# Patient Record
Sex: MALE | Race: WHITE | NOT HISPANIC OR LATINO | Employment: OTHER | URBAN - METROPOLITAN AREA
[De-identification: names, ages, dates, MRNs, and addresses within clinical notes are randomized per-mention and may not be internally consistent; named-entity substitution may affect disease eponyms.]

---

## 2017-01-27 ENCOUNTER — HOSPITAL ENCOUNTER (OUTPATIENT)
Dept: PHYSICAL THERAPY | Facility: HOSPITAL | Age: 82
Setting detail: RECURRING SERIES
Discharge: HOME | End: 2017-03-14
Attending: FAMILY MEDICINE | Admitting: FAMILY MEDICINE

## 2017-08-23 ENCOUNTER — HOSPITAL ENCOUNTER (OUTPATIENT)
Dept: FAMILY MEDICINE CLINIC | Facility: CLINIC | Age: 82
Setting detail: SPECIMEN
Discharge: HOME | End: 2017-08-23
Attending: FAMILY MEDICINE | Admitting: FAMILY MEDICINE

## 2017-08-23 LAB
BILIRUB UR QL STRIP: NEGATIVE MG/DL
CASTS URNS QL MICRO: ABNORMAL /[LPF]
COLOR UR: YELLOW
CONV BACTERIA IN URINE MICRO: NEGATIVE
CONV CLARITY OF URINE: ABNORMAL
CONV HYALINE CASTS IN URINE MICRO: 4 /[LPF] (ref 0–5)
CONV PROTEIN IN URINE BY AUTOMATED TEST STRIP: NEGATIVE MG/DL
CONV SMALL ROUND CELLS: ABNORMAL /[HPF]
CONV UROBILINOGEN IN URINE BY AUTOMATED TEST STRIP: 0.2 MG/DL
CULTURE INDICATED?: ABNORMAL
GLUCOSE UR QL: NEGATIVE MG/DL
HGB UR QL STRIP: NEGATIVE
KETONES UR QL STRIP: NEGATIVE MG/DL
LEUKOCYTE ESTERASE UR QL STRIP: NEGATIVE
NITRITE UR QL STRIP: NEGATIVE
PH UR STRIP.AUTO: 5.5 [PH] (ref 4.5–8)
PSA SERPL-MCNC: 4.52 NG/ML (ref 0–4)
RBC #/AREA URNS HPF: 0 /[HPF] (ref 0–3)
SP GR UR: 1.01 (ref 1–1.03)
SPERM URNS QL MICRO: ABNORMAL /[HPF]
SQUAMOUS SPT QL MICRO: 1 /[HPF] (ref 0–5)
UNIDENT CRYS URNS QL MICRO: ABNORMAL /[HPF]
WBC #/AREA URNS HPF: 1 /[HPF] (ref 0–5)
YEAST SPEC QL WET PREP: ABNORMAL /[HPF]

## 2019-08-19 RX ORDER — LISINOPRIL 5 MG/1
TABLET ORAL
Qty: 90 TABLET | Refills: 4 | Status: SHIPPED | OUTPATIENT
Start: 2019-08-19 | End: 2020-11-10

## 2019-09-09 ENCOUNTER — OFFICE VISIT (OUTPATIENT)
Dept: FAMILY MEDICINE CLINIC | Facility: CLINIC | Age: 84
End: 2019-09-09

## 2019-09-09 VITALS
HEART RATE: 83 BPM | WEIGHT: 199.6 LBS | SYSTOLIC BLOOD PRESSURE: 120 MMHG | BODY MASS INDEX: 28.58 KG/M2 | HEIGHT: 70 IN | OXYGEN SATURATION: 95 % | RESPIRATION RATE: 16 BRPM | DIASTOLIC BLOOD PRESSURE: 68 MMHG | TEMPERATURE: 98 F

## 2019-09-09 DIAGNOSIS — R05.9 COUGH: Primary | ICD-10-CM

## 2019-09-09 PROCEDURE — 99213 OFFICE O/P EST LOW 20 MIN: CPT | Performed by: NURSE PRACTITIONER

## 2019-09-09 RX ORDER — ATORVASTATIN CALCIUM 20 MG/1
TABLET, FILM COATED ORAL
COMMUNITY
Start: 2019-08-08 | End: 2019-10-20 | Stop reason: SDUPTHER

## 2019-09-09 RX ORDER — ASPIRIN 81 MG/1
81 TABLET ORAL DAILY
COMMUNITY

## 2019-09-09 RX ORDER — HYDROCORTISONE ACETATE 0.5 %
CREAM (GRAM) TOPICAL
COMMUNITY
End: 2021-11-17

## 2019-09-09 RX ORDER — LEVOTHYROXINE SODIUM 50 MCG
TABLET ORAL
COMMUNITY
Start: 2019-09-04 | End: 2019-12-03 | Stop reason: SDUPTHER

## 2019-09-09 NOTE — PROGRESS NOTES
"Subjective   Willis Lechuga is a 84 y.o. male.     Chief Complaint   Patient presents with   • Cough       /68 (BP Location: Left arm, Patient Position: Sitting, Cuff Size: Adult)   Pulse 83   Temp 98 °F (36.7 °C) (Oral)   Resp 16   Ht 176.5 cm (69.5\")   Wt 90.5 kg (199 lb 9.6 oz)   SpO2 95%   BMI 29.05 kg/m²     BP Readings from Last 3 Encounters:   09/09/19 120/68   06/08/18 150/70   08/23/17 160/86       Wt Readings from Last 3 Encounters:   09/09/19 90.5 kg (199 lb 9.6 oz)   06/08/18 91.6 kg (201 lb 16 oz)   08/23/17 92.5 kg (203 lb 16 oz)       Pt comes in today with c/o cough that has been going on for about 2 months. Mostly non-productive.   Does have some drainage. Not taking anything. 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   No nasal congestion, but does c/o runny nose and sneezing.   No wheezing or SOA.   No fever or chills.  Not taking anything otc.         The following portions of the patient's history were reviewed and updated as appropriate: allergies, current medications, past family history, past medical history, past social history, past surgical history and problem list.    Review of Systems   Constitutional: Negative for fever.   HENT: Positive for postnasal drip, rhinorrhea and sneezing. Negative for sore throat.    Respiratory: Positive for cough. Negative for chest tightness, shortness of breath and wheezing.    Cardiovascular: Negative for chest pain, palpitations and leg swelling.       Objective   Physical Exam   Constitutional: He is oriented to person, place, and time. He appears well-developed and well-nourished.   HENT:   Nose: Rhinorrhea present.   Mouth/Throat: Posterior oropharyngeal erythema present.   Eyes: Pupils are equal, round, and reactive to light.   Cardiovascular: Normal rate and regular rhythm.   Pulmonary/Chest: Effort normal and breath sounds normal.   Neurological: He is alert and oriented to person, place, " and time.         Diagnoses and all orders for this visit:    1. Cough (Primary)  Comments:  Advised to start otc claritin and flonase. Will also get CXR to r/o an infectious process.   Orders:  -     XR Chest 2 View; Future    During this office visit, we discussed the pertinent aspects of the visit and treatment recommendations. Pt verbalizes understanding. Follow up was discussed. Patient was given the opportunity to ask questions and discuss other concerns.       Return if symptoms worsen or fail to improve.

## 2019-09-10 DIAGNOSIS — R05.9 COUGH: ICD-10-CM

## 2019-09-13 RX ORDER — MECLIZINE HYDROCHLORIDE 25 MG/1
25 TABLET ORAL 3 TIMES DAILY PRN
Qty: 90 TABLET | Refills: 0 | Status: SHIPPED | OUTPATIENT
Start: 2019-09-13

## 2019-09-13 NOTE — TELEPHONE ENCOUNTER
Pt has old RX for Meclizine. He has been dizzy and is asking if you would send new RX for this to pharmacy. Thank you.

## 2019-09-16 ENCOUNTER — TELEPHONE (OUTPATIENT)
Dept: FAMILY MEDICINE CLINIC | Facility: CLINIC | Age: 84
End: 2019-09-16

## 2019-09-16 DIAGNOSIS — R93.89 ABNORMAL CHEST X-RAY: Primary | ICD-10-CM

## 2019-09-16 NOTE — TELEPHONE ENCOUNTER
----- Message from KIMMIE Moody sent at 9/16/2019  8:11 AM EDT -----  Let pt know that CXR was abnormal. Has some chronic changes. I want him to see pulm for further follow up and recommendation.

## 2019-10-04 ENCOUNTER — TELEPHONE (OUTPATIENT)
Dept: FAMILY MEDICINE CLINIC | Facility: CLINIC | Age: 84
End: 2019-10-04

## 2019-10-04 DIAGNOSIS — R42 VERTIGO: Primary | ICD-10-CM

## 2019-10-07 ENCOUNTER — TREATMENT (OUTPATIENT)
Dept: PHYSICAL THERAPY | Facility: CLINIC | Age: 84
End: 2019-10-07

## 2019-10-07 DIAGNOSIS — R42 VERTIGO: Primary | ICD-10-CM

## 2019-10-07 DIAGNOSIS — H81.10 BENIGN PAROXYSMAL POSITIONAL VERTIGO, UNSPECIFIED LATERALITY: ICD-10-CM

## 2019-10-07 PROCEDURE — 97161 PT EVAL LOW COMPLEX 20 MIN: CPT | Performed by: PHYSICAL THERAPIST

## 2019-10-07 PROCEDURE — 95992 CANALITH REPOSITIONING PROC: CPT | Performed by: PHYSICAL THERAPIST

## 2019-10-07 NOTE — PROGRESS NOTES
Physical Therapy Initial Evaluation and Plan of Care    Patient: Willis Lechuga   : 1935  Diagnosis/ICD-10 Code:  Vertigo [R42]  Referring practitioner: Blake Giraldo MD  Date of Initial Visit: 10/7/2019  Today's Date: 10/7/2019  Patient seen for 1 session           Subjective Questionnaire: DHI: 36 points      Subjective Evaluation    History of Present Illness  Mechanism of injury: Patient presents to physical therapy with orders to address vertigo.  He states that he has had success with physical therapy in the past but it has been over a year since he has last had treatment.  He states that it flared back up about 2 weeks ago.  He started taking Meclizine but it only helps a little bit.      He states that rolling over in bed first thing in the morning and sitting up on the side of the bed is usually what sets off his vertigo.    He is traveling to [place] tomorrow to be with his son until Thursday. (Son has Alzheimers)  He drives independently.      Patient Occupation: Retired Pain  No pain reported    Social Support  Lives in: one-story house (3 step entry from [facility])  Lives with: spouse (Lula)    Diagnostic Tests  No diagnostic tests performed    Treatments  Previous treatment: physical therapy  Patient Goals  Patient/family treatment goals: Resolve vertigo.       Objective       Active Range of Motion   Cervical/Thoracic Spine   Cervical    Flexion: WFL  Extension: WFL  Left lateral flexion: WFL  Right lateral flexion: WFL  Left rotation: WFL  Right rotation: WFL  Left Shoulder   Normal active range of motion    Right Shoulder   Normal active range of motion     See flowsheets for vestibular testing and treatment    Assessment & Plan     Assessment  Impairments: activity intolerance  Assessment details: The patient is a 84 y.o. male who presents to physical therapy today for vertigo. Upon initial evaluation, the patient demonstrates the impairments listed above. Due to these impairments,  the patient is unable to transfer in the mornings without dizziness. The patient would benefit from skilled PT services to address functional limitations and impairments and to improve patient quality of life.    Prognosis: good  Functional Limitations: moving in bed  Goals  Plan Goals: STG's: 2 weeks   Patient will report >50% reduction in symptoms  Patient will be able to perform HEP with minimal verbal cues    LTG's: By discharge  Patient will have low to absent dizziness with re-assessment using the Dizziness Handicap Inventory  Patient will demonstrate stable gait while performing 90, 180, and 360 degree turns   Patient will demonstrate stable gait with horizontal and vertical head turns  Patient will report no onset of falls  Patient will be independent with final HEP      Plan  Therapy options: will be seen for skilled physical therapy services  Planned therapy interventions: balance/weight-bearing training, functional ROM exercises, gait training, home exercise program, manual therapy, motor coordination training, neuromuscular re-education, postural training, strengthening, therapeutic activities and transfer training  Other planned therapy interventions: Canalith Repositioning Maneuver  Duration in visits: 8  Treatment plan discussed with: patient        History # of Personal Factors and/or Comorbidities: LOW (0)  Examination of Body System(s): # of elements: MODERATE (3)  Clinical Presentation: STABLE   Clinical Decision Making: LOW     Timed:         Manual Therapy:         mins  77540;     Therapeutic Exercise:         mins  46241;     Neuromuscular Katherin:        mins  90215;    Therapeutic Activity:          mins  74607;     Gait Training:           mins  82280;     Ultrasound:          mins  93123;    Ionto                                   mins   42600  Self Care                            mins   70982  Canalith Repos    10     mins 50562    Un-Timed:  Electrical Stimulation:         mins  25643 (  );  Dry Needling          mins self-pay  Traction          mins 20541  Low Eval     35     Mins  09210  Mod Eval          Mins  41807  High Eval                            Mins  57164  Re-Eval                               mins  77717        Timed Treatment:   10   mins   Total Treatment:     45   mins    PT SIGNATURE: Evie Rose, PT   DATE TREATMENT INITIATED: 10/7/2019    Medicare Initial Certification  Certification Period: 1/5/2020  I certify that the therapy services are furnished while this patient is under my care.  The services outlined above are required by this patient, and will be reviewed every 90 days.     PHYSICIAN: Blake Giraldo MD      DATE:     Please sign and return via fax to [phone].. Thank you, [facility].

## 2019-10-15 ENCOUNTER — TREATMENT (OUTPATIENT)
Dept: PHYSICAL THERAPY | Facility: CLINIC | Age: 84
End: 2019-10-15

## 2019-10-15 DIAGNOSIS — H81.10 BENIGN PAROXYSMAL POSITIONAL VERTIGO, UNSPECIFIED LATERALITY: ICD-10-CM

## 2019-10-15 DIAGNOSIS — R42 VERTIGO: Primary | ICD-10-CM

## 2019-10-15 PROCEDURE — 97112 NEUROMUSCULAR REEDUCATION: CPT | Performed by: PHYSICAL THERAPIST

## 2019-10-15 PROCEDURE — 95992 CANALITH REPOSITIONING PROC: CPT | Performed by: PHYSICAL THERAPIST

## 2019-10-15 NOTE — PROGRESS NOTES
Physical Therapy Daily Progress Note    VISIT#: 2    Subjective   Willis Lechuga reports that he was doing well until he went to the dentist Friday.  He was reclined in the chair for over 30 minutes and had significant vertigo upon return to seated position.   He states that he sat for 5-10 minutes to rest but was then able to drive home.   Pain Rating (0-10): 0    Objective     See Exercise, Manual, and Modality Logs for complete treatment.     Patient Education: Added VOR x 1 to HEP    Assessment & Plan     Assessment  Assessment details: Patient had significant rotary nystagmus noted with L Browning-Hallpike testing, cervical rotation L to R.  It resolved after Epley maneuver and he left symptom free.          Progress per Plan of Care            Timed:         Manual Therapy:         mins  53554;     Therapeutic Exercise:         mins  84139;     Neuromuscular Katherin:    15    mins  54703;    Therapeutic Activity:          mins  11144;     Gait Training:           mins  49705;     Ultrasound:          mins  56553;    Ionto                                   mins   60364  Self Care                            mins   32634  Canalith Repos               10    mins  14123    Un-Timed:  Electrical Stimulation:         mins  08578 ( );  Dry Needling          mins self-pay  Traction          mins 72236  Low Eval          Mins  93285  Mod Eval          Mins  32146  High Eval                            Mins  87044  Re-Eval                               mins  92526    Timed Treatment:   25   mins   Total Treatment:     25   mins    Evie Rose PT  [place] License # [id number]  Physical Therapist

## 2019-10-21 RX ORDER — ATORVASTATIN CALCIUM 20 MG/1
TABLET, FILM COATED ORAL
Qty: 90 TABLET | Refills: 4 | Status: SHIPPED | OUTPATIENT
Start: 2019-10-21 | End: 2021-01-14

## 2019-10-29 ENCOUNTER — TRANSCRIBE ORDERS (OUTPATIENT)
Dept: ADMINISTRATIVE | Facility: HOSPITAL | Age: 84
End: 2019-10-29

## 2019-10-29 DIAGNOSIS — J84.10 PULMONARY FIBROSIS (HCC): Primary | ICD-10-CM

## 2019-10-30 ENCOUNTER — TELEPHONE (OUTPATIENT)
Dept: FAMILY MEDICINE CLINIC | Facility: CLINIC | Age: 84
End: 2019-10-30

## 2019-10-30 DIAGNOSIS — R42 VERTIGO: Primary | ICD-10-CM

## 2019-10-30 NOTE — TELEPHONE ENCOUNTER
Patient was called and informed. Patient states that he has been dx with pulmonary fibrosis by Dr. Vincent, and is scheduled for another appt tomorrow. Patient is asking if he can be seen after PMJ receives results from Dr. Vincent for f/u and medicare wellness exam to be done at the same time

## 2019-10-30 NOTE — TELEPHONE ENCOUNTER
Patient called stating that he had a referral sent to office in [place] for vertigo. Patient states that years ago he went to Dr. Smiley in [place] by [facility]. Patient states that he would like to go this office again, and is asking if new referral can be sent.

## 2019-10-31 NOTE — TELEPHONE ENCOUNTER
Spoke to pt he has a few more test to do that DR. Vincent ordered and is to follow up with him in one month. Has appt with PT tomorrow for the vertigo. Will see DR. IRVIN in Dec

## 2019-10-31 NOTE — TELEPHONE ENCOUNTER
Patient was called and scheduled for wellness exam on 12/27. Patient is asking if he can be worked in, in the next few weeks for the f/u appt. Patient states that appt would need to be on a Monday or Friday.

## 2019-11-09 ENCOUNTER — HOSPITAL ENCOUNTER (OUTPATIENT)
Dept: RESPIRATORY THERAPY | Facility: HOSPITAL | Age: 84
Discharge: HOME | End: 2019-11-09
Admitting: INTERNAL MEDICINE

## 2019-11-09 VITALS — HEIGHT: 70 IN | BODY MASS INDEX: 28.8 KG/M2 | WEIGHT: 201.2 LBS

## 2019-11-09 DIAGNOSIS — J84.10 PULMONARY FIBROSIS (HCC): ICD-10-CM

## 2019-11-09 PROCEDURE — 94640 AIRWAY INHALATION TREATMENT: CPT

## 2019-11-09 PROCEDURE — 94060 EVALUATION OF WHEEZING: CPT

## 2019-11-09 PROCEDURE — 94729 DIFFUSING CAPACITY: CPT

## 2019-11-09 PROCEDURE — 94726 PLETHYSMOGRAPHY LUNG VOLUMES: CPT

## 2019-11-09 RX ORDER — ALBUTEROL SULFATE 2.5 MG/3ML
2.5 SOLUTION RESPIRATORY (INHALATION) ONCE AS NEEDED
Status: COMPLETED | OUTPATIENT
Start: 2019-11-09 | End: 2019-11-09

## 2019-11-09 RX ADMIN — ALBUTEROL SULFATE 2.5 MG: 2.5 SOLUTION RESPIRATORY (INHALATION) at 07:45

## 2019-11-13 ENCOUNTER — TREATMENT (OUTPATIENT)
Dept: PHYSICAL THERAPY | Facility: CLINIC | Age: 84
End: 2019-11-13

## 2019-11-13 DIAGNOSIS — R42 VERTIGO: Primary | ICD-10-CM

## 2019-11-13 PROCEDURE — 97112 NEUROMUSCULAR REEDUCATION: CPT | Performed by: PHYSICAL THERAPIST

## 2019-11-13 PROCEDURE — 95992 CANALITH REPOSITIONING PROC: CPT | Performed by: PHYSICAL THERAPIST

## 2019-11-13 NOTE — PROGRESS NOTES
Physical Therapy  Progress Note    VISIT#: 3    Subjective Evaluation    History of Present Illness  Mechanism of injury: Pt reports his symptoms started about a month ago. Mainly with positional changes, rolling to R in bed and upon sit to stand and supine.  He was evaluated at the [facility] and received 2 treatment. It helped but his symptoms have not completley resolved. Still having dizziness when he gets in bed and rolls over to his R. feels he is in a fog. Can't walk a straight line anymore and has poor balance  walking to the BR at night.   Has been Dx with pulmonary fibrosis. Denies any illness or  falls. He is well known to me and I have treated him for BPPV a few times in the past. Reports he requested to come to this clinic this time.     Quality of life: good    Pain  No pain reported               Objective :    Ambulates I , slow and guarded movements. Reports inc dizziness upon initial sit to stand.    DHP: R side pos for up beating nystagmus lasting for less than 10'   L side neg for nystagmus but pos for subjective co of dizziness lasting for about 5'    Roll test : R pos for subjective co of dizziness lasting for about 5' no nystagmus noted  L neg     Inc dizziness upon sitting up from the testing position    Rx:  Pt received R  Epley maneuver f/b post treatment precautions for 24 hrs.  Pt felt better after the treatment today. Reported his head was clear and didn't feel like he was in a fog anymore.   Patient Education: discussed post rx precautions     Assessment & Plan     Assessment  Assessment details: Pt is a 85 y/o m, presents with recurrent symptoms of BPPV. DHP positive R side for post canal canalith. Inc symptoms with positional changes and rolling in bed.  Good silvana to today's treatment and demo understanding of post treatment instructions. He is a good candidate for therapy to address impairments  and resolve all remaining symptoms.     Goals  Plan Goals: STG's: 2 weeks    Patient will report >50% reduction in symptoms  Patient will be able to perform HEP with minimal verbal cues    LTG's: By discharge  Patient will have low to absent dizziness with re-assessment using the Dizziness Handicap Inventory  Patient will demonstrate stable gait while performing 90, 180, and 360 degree turns   Patient will demonstrate stable gait with horizontal and vertical head turns  Patient will report no onset of falls  Patient will be independent with final HEP      Plan  Therapy options: will be seen for skilled physical therapy services  Planned therapy interventions: neuromuscular re-education  Other planned therapy interventions: CRM  Frequency: 1x week  Duration in visits: 8  Treatment plan discussed with: patient  Plan details: POC : 8 visits  Today is visit 3/8          Progress per Plan of Care/ will be seen in 1 week            Timed:         Manual Therapy:         mins  89951;     Therapeutic Exercise:         mins  15681;     Neuromuscular Katherin:    35    mins  64317;    Therapeutic Activity:         mins  53686;     Gait Training:           mins  27402;     Ultrasound:          mins  72048;    Ionto                                   mins   97063  Self Care                            mins   32182  Canal repositioning      15     mins    42679    Un-Timed:  Electrical Stimulation:         mins  01492 ( );  Traction          mins 62514  Low Eval          Mins  16296  Mod Eval          Mins  90956  High Eval                            Mins  19360  Re-Eval                               mins  31189    Timed Treatment:   50   mins   Total Treatment:     50   mins    Baljit Joseph PT, CLT  Physical Therapist

## 2019-11-22 ENCOUNTER — TREATMENT (OUTPATIENT)
Dept: PHYSICAL THERAPY | Facility: CLINIC | Age: 84
End: 2019-11-22

## 2019-11-22 DIAGNOSIS — R42 VERTIGO: Primary | ICD-10-CM

## 2019-11-22 PROCEDURE — 97112 NEUROMUSCULAR REEDUCATION: CPT | Performed by: PHYSICAL THERAPIST

## 2019-11-22 PROCEDURE — 95992 CANALITH REPOSITIONING PROC: CPT | Performed by: PHYSICAL THERAPIST

## 2019-11-22 NOTE — PROGRESS NOTES
Physical Therapy Daily Progress Note    VISIT#: 4    Subjective   Pt reports: doing a little better but has had a few episodes in the past week. Still feels some dizziness when roll over onto the R side.      Objective   DHP: R pos for up beating nystagmus lasting for about 5'  L not tested    Rx: R Epley maneuver f/b post treatment instructions and precautions for 24 hrs. Pt demo understanding of home instructions. silvana treatment well.    Patient Education:    Assessment & Plan     Assessment  Assessment details: Has responded well to treatment, mild symptoms present today. Good silvana to today's treatment. Demo understanding of home instructions.    Plan  Plan details: Will be seen next week, reassess and treat as indicated.                      Timed:         Manual Therapy:         mins  27014;     Therapeutic Exercise:         mins  84064;     Neuromuscular Katherin:   10     mins  68703;    Therapeutic Activity:          mins  97746;     Gait Training:           mins  71723;     Ultrasound:          mins  28907;    Ionto                                   mins   01627  Self Care                           mins   10086  Canal repositioning       15    mins    61149    Un-Timed:  Electrical Stimulation:         mins  14081 ( );  Traction          mins 16224  Low Eval          Mins  80364  Mod Eval          Mins  87604  High Eval                            Mins  06684  Re-Eval                               mins  40380    Timed Treatment:  25    mins   Total Treatment:    25    mins    Baljit Joseph, PT, CLT  Physical Therapist

## 2019-11-26 ENCOUNTER — TREATMENT (OUTPATIENT)
Dept: PHYSICAL THERAPY | Facility: CLINIC | Age: 84
End: 2019-11-26

## 2019-11-26 DIAGNOSIS — R42 VERTIGO: Primary | ICD-10-CM

## 2019-11-26 PROCEDURE — 95992 CANALITH REPOSITIONING PROC: CPT | Performed by: PHYSICAL THERAPIST

## 2019-12-03 ENCOUNTER — TREATMENT (OUTPATIENT)
Dept: PHYSICAL THERAPY | Facility: CLINIC | Age: 84
End: 2019-12-03

## 2019-12-03 DIAGNOSIS — R42 VERTIGO: Primary | ICD-10-CM

## 2019-12-03 PROCEDURE — 95992 CANALITH REPOSITIONING PROC: CPT | Performed by: PHYSICAL THERAPIST

## 2019-12-03 PROCEDURE — 97112 NEUROMUSCULAR REEDUCATION: CPT | Performed by: PHYSICAL THERAPIST

## 2019-12-03 RX ORDER — LEVOTHYROXINE SODIUM 0.05 MG/1
TABLET ORAL
Qty: 90 TABLET | Refills: 4 | Status: SHIPPED | OUTPATIENT
Start: 2019-12-03 | End: 2019-12-28

## 2019-12-03 NOTE — PROGRESS NOTES
Physical Therapy Daily Progress Note    VISIT#: 6    Subjective   Pt reports: still having symptoms, lasts for a few secs mainly rolling over in bed and looking down. Feels better for a couple of days after ea treatment but it seems to come back. Feels he is drunk when he walks.      Objective     DHP: R pos for up beating nystagmus lasting for less than 10'  L neg  Rx;   R Epley maneuver f/b post rx instructions that he is familiar with and has been compliant.   Patient Education:    Assessment & Plan     Assessment  Assessment details: R side still pos for post canal canalith. Good silvana to today's treatment, felt better right after the treatment today.     Plan  Plan details: Will be seen in 1 week to reassess and treat as indicated          Progress per Plan of Care            Timed:         Manual Therapy:         mins  92443;     Therapeutic Exercise:         mins  15811;     Neuromuscular Katherin:  15      mins  32695;    Therapeutic Activity:          mins  60347;     Gait Training:           mins  68502;     Ultrasound:          mins  61337;    Ionto                                   mins   28766  Self Care                            mins   23908  Canal repositioning       15    mins    05154    Un-Timed:  Electrical Stimulation:         mins  32092 ( );  Traction          mins 07223  Low Eval          Mins  81858  Mod Eval          Mins  24423  High Eval                            Mins  68977  Re-Eval                               mins  53260    Timed Treatment:  30    mins   Total Treatment:    30    mins    Baljit Joseph, PT, CLT  Physical Therapist

## 2019-12-12 ENCOUNTER — TREATMENT (OUTPATIENT)
Dept: PHYSICAL THERAPY | Facility: CLINIC | Age: 84
End: 2019-12-12

## 2019-12-12 DIAGNOSIS — H81.10 BENIGN PAROXYSMAL POSITIONAL VERTIGO, UNSPECIFIED LATERALITY: Primary | ICD-10-CM

## 2019-12-12 PROCEDURE — 95992 CANALITH REPOSITIONING PROC: CPT | Performed by: PHYSICAL THERAPIST

## 2019-12-12 NOTE — PROGRESS NOTES
Physical Therapy Daily Progress Note    VISIT#: 7    Subjective   Pt reports: 100% improved since last treatment. Hasn't had any symptoms with any positional changes.       Objective     DHP: neg B  Roll test: neg B     Patient Education:    Assessment & Plan     Assessment  Assessment details: BPPV symptoms resolved. Pt reports 100% improvement. No symptoms since last treatment. All goals met.     Goals  Plan Goals: Goals  Plan Goals: STG's: 2 weeks   Patient will report >50% reduction in symptoms  Patient will be able to perform HEP with minimal verbal cues    LTG's: By discharge  Patient will have low to absent dizziness with re-assessment using the Dizziness Handicap Inventory  Patient will demonstrate stable gait while performing 90, 180, and 360 degree turns   Patient will demonstrate stable gait with horizontal and vertical head turns  Patient will report no onset of falls  Patient will be independent with final HEP     Plan  Plan details: Will put therapy on hold for 3-4 weeks pt to return for additional treatment if symptoms occur otherwise will be DC.                     Timed:         Manual Therapy:         mins  23239;     Therapeutic Exercise:         mins  99952;     Neuromuscular Katherin:   15     mins  59547;    Therapeutic Activity:          mins  32139;     Gait Training:           mins  69811;     Ultrasound:         mins  64221;    Ionto                                   mins   10028  Self Care                            mins   87160  Canal repositioning           mins    93233    Un-Timed:  Electrical Stimulation:         mins  19160 ( );  Traction          mins 15900  Low Eval          Mins  73437  Mod Eval          Mins  04414  High Eval                            Mins  99386  Re-Eval                               mins  80371    Timed Treatment:   15   mins   Total Treatment:    15    mins    Baljit Joseph, PT, CLT  Physical Therapist

## 2019-12-27 ENCOUNTER — OFFICE VISIT (OUTPATIENT)
Dept: FAMILY MEDICINE CLINIC | Facility: CLINIC | Age: 84
End: 2019-12-27

## 2019-12-27 DIAGNOSIS — E55.9 VITAMIN D DEFICIENCY, UNSPECIFIED: ICD-10-CM

## 2019-12-27 DIAGNOSIS — R79.9 ABNORMAL FINDING OF BLOOD CHEMISTRY, UNSPECIFIED: ICD-10-CM

## 2019-12-27 DIAGNOSIS — M77.42 METATARSALGIA OF LEFT FOOT: ICD-10-CM

## 2019-12-27 DIAGNOSIS — M15.9 PRIMARY OSTEOARTHRITIS INVOLVING MULTIPLE JOINTS: ICD-10-CM

## 2019-12-27 DIAGNOSIS — F41.1 GENERALIZED ANXIETY DISORDER: ICD-10-CM

## 2019-12-27 DIAGNOSIS — G89.29 CHRONIC BILATERAL LOW BACK PAIN WITHOUT SCIATICA: ICD-10-CM

## 2019-12-27 DIAGNOSIS — E03.9 ACQUIRED HYPOTHYROIDISM: ICD-10-CM

## 2019-12-27 DIAGNOSIS — Z00.00 MEDICARE ANNUAL WELLNESS VISIT, SUBSEQUENT: Primary | ICD-10-CM

## 2019-12-27 DIAGNOSIS — Z12.5 ENCOUNTER FOR SCREENING FOR MALIGNANT NEOPLASM OF PROSTATE: ICD-10-CM

## 2019-12-27 DIAGNOSIS — I10 ESSENTIAL HYPERTENSION: ICD-10-CM

## 2019-12-27 DIAGNOSIS — M54.50 CHRONIC BILATERAL LOW BACK PAIN WITHOUT SCIATICA: ICD-10-CM

## 2019-12-27 DIAGNOSIS — E78.2 MIXED HYPERLIPIDEMIA: ICD-10-CM

## 2019-12-27 PROBLEM — M19.90 DEGENERATIVE JOINT DISEASE: Status: ACTIVE | Noted: 2019-12-27

## 2019-12-27 PROBLEM — M54.81 OCCIPITAL NEURALGIA: Status: ACTIVE | Noted: 2018-06-08

## 2019-12-27 PROBLEM — M77.40 METATARSALGIA: Status: ACTIVE | Noted: 2017-08-23

## 2019-12-27 LAB
BASOPHILS # BLD AUTO: 0.04 10*3/MM3 (ref 0–0.2)
BASOPHILS NFR BLD AUTO: 0.5 % (ref 0–1.5)
BILIRUB UR QL STRIP: NEGATIVE
CLARITY UR: CLEAR
COLOR UR: YELLOW
DEPRECATED RDW RBC AUTO: 44.2 FL (ref 37–54)
EOSINOPHIL # BLD AUTO: 0.26 10*3/MM3 (ref 0–0.4)
EOSINOPHIL NFR BLD AUTO: 3.3 % (ref 0.3–6.2)
ERYTHROCYTE [DISTWIDTH] IN BLOOD BY AUTOMATED COUNT: 13.2 % (ref 12.3–15.4)
GLUCOSE UR STRIP-MCNC: NEGATIVE MG/DL
HBA1C MFR BLD: 6 % (ref 3.5–5.6)
HCT VFR BLD AUTO: 43.2 % (ref 37.5–51)
HGB BLD-MCNC: 14.3 G/DL (ref 13–17.7)
HGB UR QL STRIP.AUTO: NEGATIVE
IMM GRANULOCYTES # BLD AUTO: 0.01 10*3/MM3 (ref 0–0.05)
IMM GRANULOCYTES NFR BLD AUTO: 0.1 % (ref 0–0.5)
KETONES UR QL STRIP: NEGATIVE
LEUKOCYTE ESTERASE UR QL STRIP.AUTO: NEGATIVE
LYMPHOCYTES # BLD AUTO: 2.29 10*3/MM3 (ref 0.7–3.1)
LYMPHOCYTES NFR BLD AUTO: 29 % (ref 19.6–45.3)
MCH RBC QN AUTO: 30.2 PG (ref 26.6–33)
MCHC RBC AUTO-ENTMCNC: 33.1 G/DL (ref 31.5–35.7)
MCV RBC AUTO: 91.3 FL (ref 79–97)
MONOCYTES # BLD AUTO: 0.89 10*3/MM3 (ref 0.1–0.9)
MONOCYTES NFR BLD AUTO: 11.3 % (ref 5–12)
NEUTROPHILS # BLD AUTO: 4.42 10*3/MM3 (ref 1.7–7)
NEUTROPHILS NFR BLD AUTO: 55.8 % (ref 42.7–76)
NITRITE UR QL STRIP: NEGATIVE
NRBC BLD AUTO-RTO: 0 /100 WBC (ref 0–0.2)
PH UR STRIP.AUTO: 6 [PH] (ref 5–8)
PLATELET # BLD AUTO: 264 10*3/MM3 (ref 140–450)
PMV BLD AUTO: 10.4 FL (ref 6–12)
PROT UR QL STRIP: NEGATIVE
RBC # BLD AUTO: 4.73 10*6/MM3 (ref 4.14–5.8)
SP GR UR STRIP: 1.02 (ref 1–1.03)
UROBILINOGEN UR QL STRIP: NORMAL
WBC NRBC COR # BLD: 7.91 10*3/MM3 (ref 3.4–10.8)

## 2019-12-27 PROCEDURE — 80061 LIPID PANEL: CPT | Performed by: FAMILY MEDICINE

## 2019-12-27 PROCEDURE — 82306 VITAMIN D 25 HYDROXY: CPT | Performed by: FAMILY MEDICINE

## 2019-12-27 PROCEDURE — 81003 URINALYSIS AUTO W/O SCOPE: CPT | Performed by: FAMILY MEDICINE

## 2019-12-27 PROCEDURE — 99214 OFFICE O/P EST MOD 30 MIN: CPT | Performed by: FAMILY MEDICINE

## 2019-12-27 PROCEDURE — 82607 VITAMIN B-12: CPT | Performed by: FAMILY MEDICINE

## 2019-12-27 PROCEDURE — G0439 PPPS, SUBSEQ VISIT: HCPCS | Performed by: FAMILY MEDICINE

## 2019-12-27 PROCEDURE — 85652 RBC SED RATE AUTOMATED: CPT | Performed by: FAMILY MEDICINE

## 2019-12-27 PROCEDURE — 80053 COMPREHEN METABOLIC PANEL: CPT | Performed by: FAMILY MEDICINE

## 2019-12-27 PROCEDURE — G0103 PSA SCREENING: HCPCS | Performed by: FAMILY MEDICINE

## 2019-12-27 PROCEDURE — 83036 HEMOGLOBIN GLYCOSYLATED A1C: CPT | Performed by: FAMILY MEDICINE

## 2019-12-27 PROCEDURE — 84443 ASSAY THYROID STIM HORMONE: CPT | Performed by: FAMILY MEDICINE

## 2019-12-27 PROCEDURE — 85025 COMPLETE CBC W/AUTO DIFF WBC: CPT | Performed by: FAMILY MEDICINE

## 2019-12-27 RX ORDER — NAD, REDUCED, DISODIUM 100 %
POWDER (GRAM) MISCELLANEOUS
COMMUNITY
End: 2020-04-27

## 2019-12-27 RX ORDER — NIACINAMIDE 100 %
POWDER (GRAM) MISCELLANEOUS
COMMUNITY
End: 2020-04-27

## 2019-12-27 NOTE — PROGRESS NOTES
The ABCs of the Annual Wellness Visit  Subsequent Medicare Wellness Visit    Chief Complaint   Patient presents with   • Medicare Wellness-subsequent       Subjective   History of Present Illness:  Willis Lechuga is a 84 y.o. male who presents for a Subsequent Medicare Wellness Visit.   As part of that visit he will undergo a health risk assessment today.  His colonoscopy is up-to-date.  Labs will be updated after today.  Immunizations will be reviewed and he will be updated with what ever he is behind on.            Patient is also here for a physical.  He is under Rx for HTN, HLP, previous small CVA, Hypothyroid, DJD. Anxiety, vitamin D deficiency   Patient was recently told he has pulmonary fibrosis.   Etiololy unknown.  Colon up to date.          HEALTH RISK ASSESSMENT    Recent Hospitalizations:  No hospitalization(s) within the last year.    Current Medical Providers:  Patient Care Team:  Blake Giraldo MD as PCP - General (Family Medicine)  Blake Giraldo MD as PCP - Claims Attributed    Smoking Status:  Social History     Tobacco Use   Smoking Status Never Smoker   Smokeless Tobacco Never Used       Alcohol Consumption:  Social History     Substance and Sexual Activity   Alcohol Use Never   • Alcohol/week: 1.0 standard drinks   • Types: 1 Cans of beer per week   • Frequency: Never    Comment: occasionally       Depression Screen:   PHQ-2/PHQ-9 Depression Screening 12/27/2019   Little interest or pleasure in doing things 0   Feeling down, depressed, or hopeless 0   Total Score 0       Fall Risk Screen:  TOREY Fall Risk Assessment was completed, and patient is at LOW risk for falls.Assessment completed on:12/27/2019    Health Habits and Functional and Cognitive Screening:  Functional & Cognitive Status 12/27/2019   Do you have difficulty preparing food and eating? No   Do you have difficulty bathing yourself, getting dressed or grooming yourself? No   Do you have difficulty using the toilet? No    Do you have difficulty moving around from place to place? No   Do you have trouble with steps or getting out of a bed or a chair? No   Current Diet Well Balanced Diet   Dental Exam Up to date   Eye Exam Up to date   Current Exercise Activities Include Gardening   Do you need help using the phone?  No   Are you deaf or do you have serious difficulty hearing?  No   Do you need help with transportation? No   Do you need help shopping? No   Do you need help preparing meals?  No   Do you need help with housework?  No   Do you need help with laundry? No   Do you need help taking your medications? No   Do you ever drive or ride in a car without wearing a seat belt? No   Have you felt unusual stress, anger or loneliness in the last month? No   Who do you live with? Spouse   If you need help, do you have trouble finding someone available to you? No   Have you been bothered in the last four weeks by sexual problems? No   Do you have difficulty concentrating, remembering or making decisions? No         Does the patient have evidence of cognitive impairment? No    Asprin use counseling:Taking ASA appropriately as indicated    Age-appropriate Screening Schedule:  Refer to the list below for future screening recommendations based on patient's age, sex and/or medical conditions. Orders for these recommended tests are listed in the plan section. The patient has been provided with a written plan.    Health Maintenance   Topic Date Due   • TDAP/TD VACCINES (1 - Tdap) 08/07/1946   • ZOSTER VACCINE (1 of 2) 08/07/1985   • LIPID PANEL  09/09/2019   • INFLUENZA VACCINE  Completed   • PNEUMOCOCCAL VACCINES (65+ LOW/MEDIUM RISK)  Completed          The following portions of the patient's history were reviewed and updated as appropriate: allergies, current medications, past family history, past medical history, past social history, past surgical history and problem list.    Outpatient Medications Prior to Visit   Medication Sig Dispense  Refill   • aspirin 81 MG EC tablet Take 81 mg by mouth Daily.     • atorvastatin (LIPITOR) 20 MG tablet TAKE 1 TABLET DAILY 90 tablet 4   • Glucosamine-Chondroit-Vit C-Mn (GLUCOSAMINE CHONDR 1500 COMPLX) capsule Take  by mouth.     • levothyroxine (SYNTHROID, LEVOTHROID) 50 MCG tablet TAKE 1 TABLET DAILY 90 tablet 4   • lisinopril (PRINIVIL,ZESTRIL) 5 MG tablet TAKE 1 TABLET DAILY 90 tablet 4   • Nadide (NICOTINAMIDE ADENINE DINUCLEO) powder      • Niacinamide (NICOTINAMIDE) powder      • meclizine (ANTIVERT) 25 MG tablet Take 1 tablet by mouth 3 (Three) Times a Day As Needed for dizziness. 90 tablet 0   • Lactobacillus (PROBIOTIC ACIDOPHILUS PO) Take  by mouth.       No facility-administered medications prior to visit.        There is no problem list on file for this patient.      Advanced Care Planning:  Patient does not have an advance directive - not interested in additional information    Review of Systems   Constitutional: Negative.  Negative for diaphoresis, fatigue and fever.   HENT: Negative.  Negative for congestion, ear pain, hearing loss, postnasal drip, sinus pressure, sore throat, trouble swallowing and voice change.    Eyes: Negative.  Negative for pain, redness and visual disturbance.   Respiratory: Positive for chest tightness and shortness of breath. Negative for cough.         With exertion he has the symptoms.  At rest he is fine   Cardiovascular: Positive for chest pain. Negative for palpitations and leg swelling.        He has some chest tightness with exertion that he thinks is coming out of his lungs more than his heart.   Gastrointestinal: Negative.  Negative for abdominal pain, blood in stool, constipation and diarrhea.   Endocrine: Negative.  Negative for cold intolerance and heat intolerance.   Genitourinary: Negative.  Negative for difficulty urinating, enuresis, flank pain, frequency and urgency.   Musculoskeletal: Positive for arthralgias, back pain and neck stiffness. Negative for  "myalgias and neck pain.        Patient has some mild osteoarthritic changes in some of his joints and in his back   Skin: Negative.  Negative for color change and rash.   Allergic/Immunologic: Negative.  Negative for environmental allergies.   Neurological: Negative.  Negative for syncope, weakness and headaches.   Hematological: Negative.  Does not bruise/bleed easily.   Psychiatric/Behavioral: Negative.  Negative for behavioral problems, decreased concentration, dysphoric mood and suicidal ideas. The patient is not nervous/anxious.    All other systems reviewed and are negative.      Compared to one year ago, the patient feels his physical health is better.  Compared to one year ago, the patient feels his mental health is better.    Reviewed chart for potential of high risk medication in the elderly: yes  Reviewed chart for potential of harmful drug interactions in the elderly:yes    Objective         Vitals:    12/27/19 1447   BP: 144/74   BP Location: Right arm   Cuff Size: Adult   Pulse: 91   Resp: 16   Temp: 97.7 °F (36.5 °C)   TempSrc: Oral   SpO2: 94%   Weight: 93 kg (205 lb)   Height: 176.5 cm (69.5\")   PainSc: 0-No pain       Body mass index is 29.84 kg/m².  Discussed the patient's BMI with him. The BMI is in the acceptable range.    Physical Exam   Constitutional: He is oriented to person, place, and time. He appears well-developed and well-nourished.   HENT:   Head: Normocephalic.   Right Ear: External ear normal.   Left Ear: External ear normal.   Nose: Nose normal.   Mouth/Throat: Oropharynx is clear and moist. No oropharyngeal exudate.   Eyes: Pupils are equal, round, and reactive to light. Conjunctivae and EOM are normal.   Neck: Normal range of motion. Neck supple.   Cardiovascular: Normal rate, regular rhythm, normal heart sounds and intact distal pulses.   Pulmonary/Chest: Effort normal and breath sounds normal.   Abdominal: Soft. Bowel sounds are normal.   Musculoskeletal: Normal range of motion. "   Neurological: He is alert and oriented to person, place, and time.   Skin: Skin is warm and dry.   Psychiatric: He has a normal mood and affect. His behavior is normal. Judgment and thought content normal.             Assessment/Plan   Medicare Risks and Personalized Health Plan  CMS Preventative Services Quick Reference  Cardiovascular risk  Chronic Pain   Colon Cancer Screening  Dementia/Memory   Depression/Dysphoria  Inadequate Social Support, Isolation, Loneliness, Lack of Transportation, Financial Difficulties, or Caregiver Stress   Inactivity/Sedentary  Lung Cancer Risk  Obesity/Overweight     The above risks/problems have been discussed with the patient.  Pertinent information has been shared with the patient in the After Visit Summary.  Follow up plans and orders are seen below in the Assessment/Plan Section.    Diagnoses and all orders for this visit:    1. Medicare annual wellness visit, subsequent (Primary)      Follow Up:  No follow-ups on file.     An After Visit Summary and PPPS were given to the patient.

## 2019-12-28 LAB
25(OH)D3 SERPL-MCNC: 27.6 NG/ML (ref 30–100)
ALBUMIN SERPL-MCNC: 4.2 G/DL (ref 3.5–5.2)
ALBUMIN/GLOB SERPL: 1.2 G/DL
ALP SERPL-CCNC: 52 U/L (ref 39–117)
ALT SERPL W P-5'-P-CCNC: 20 U/L (ref 1–41)
ANION GAP SERPL CALCULATED.3IONS-SCNC: 10.3 MMOL/L (ref 5–15)
AST SERPL-CCNC: 17 U/L (ref 1–40)
BILIRUB SERPL-MCNC: 0.3 MG/DL (ref 0.2–1.2)
BUN BLD-MCNC: 16 MG/DL (ref 8–23)
BUN/CREAT SERPL: 15.5 (ref 7–25)
CALCIUM SPEC-SCNC: 9.4 MG/DL (ref 8.6–10.5)
CHLORIDE SERPL-SCNC: 96 MMOL/L (ref 98–107)
CHOLEST SERPL-MCNC: 120 MG/DL (ref 0–200)
CO2 SERPL-SCNC: 27.7 MMOL/L (ref 22–29)
CREAT BLD-MCNC: 1.03 MG/DL (ref 0.76–1.27)
ERYTHROCYTE [SEDIMENTATION RATE] IN BLOOD: 22 MM/HR (ref 0–20)
GFR SERPL CREATININE-BSD FRML MDRD: 69 ML/MIN/1.73
GLOBULIN UR ELPH-MCNC: 3.5 GM/DL
GLUCOSE BLD-MCNC: 94 MG/DL (ref 65–99)
HDLC SERPL-MCNC: 38 MG/DL (ref 40–60)
LDLC SERPL CALC-MCNC: 56 MG/DL (ref 0–100)
LDLC/HDLC SERPL: 1.48 {RATIO}
POTASSIUM BLD-SCNC: 4.3 MMOL/L (ref 3.5–5.2)
PROT SERPL-MCNC: 7.7 G/DL (ref 6–8.5)
PSA SERPL-MCNC: 5.31 NG/ML (ref 0–4)
SODIUM BLD-SCNC: 134 MMOL/L (ref 136–145)
TRIGL SERPL-MCNC: 129 MG/DL (ref 0–150)
TSH SERPL DL<=0.05 MIU/L-ACNC: 11 UIU/ML (ref 0.27–4.2)
VIT B12 BLD-MCNC: 450 PG/ML (ref 211–946)
VLDLC SERPL-MCNC: 25.8 MG/DL (ref 5–40)

## 2019-12-28 RX ORDER — LEVOTHYROXINE SODIUM 88 UG/1
88 TABLET ORAL DAILY
Qty: 90 TABLET | Refills: 2 | Status: SHIPPED | OUTPATIENT
Start: 2019-12-28 | End: 2020-09-08

## 2019-12-28 NOTE — PROGRESS NOTES
Tell Willis that his vitamin D level is a little low so we need to replace it.  Your labs indicate your Vit D level is low.  We will call in capsules of the 50,000 units each and I would ask that you take one a week for the next 12 weeks.   After those 12 weeks are completed then  buy otc Vit D at 2000 units each  and take one daily.   Lets recheck the Vit D level in about 5-6 months..      Tell him his TSH indicates he still needs a little bit more thyroid replacement.  Go ahead and finish up the 50 mcg pills that he has now but when he refills it I want to change him to the 88 mcg pills.  After he is on the 88 mcg pills for 8 to 10 weeks I want to repeat his thyroid testing  His prostate test–the PSA is still a little elevated.  2 years ago he was 4.5 and now he is 5.3.  I think it is time we talk to a urologist and maybe get an ultrasound done on the prostate.  If he wants to wait another 3 to 4 months to see what this does I think that is okay also.  We can repeat this in 4 months and if it is gone up again then we will really need to see the urologist.  If it is gone down then we could certainly wait.  Since he is 84 years old we have options both ways.  His lipid panel is very good so stay on the same diet and same medications.  His CMP, CBC, and A1c were all normal.  I want him to be careful with carbohydrates and I want him to stay active.  Otherwise no other changes needed right now

## 2019-12-30 VITALS
SYSTOLIC BLOOD PRESSURE: 132 MMHG | BODY MASS INDEX: 29.35 KG/M2 | DIASTOLIC BLOOD PRESSURE: 82 MMHG | TEMPERATURE: 97.7 F | OXYGEN SATURATION: 94 % | WEIGHT: 205 LBS | RESPIRATION RATE: 16 BRPM | HEART RATE: 91 BPM | HEIGHT: 70 IN

## 2019-12-30 PROBLEM — E55.9 VITAMIN D DEFICIENCY, UNSPECIFIED: Status: ACTIVE | Noted: 2019-12-30

## 2019-12-30 PROBLEM — Z00.00 MEDICARE ANNUAL WELLNESS VISIT, SUBSEQUENT: Status: ACTIVE | Noted: 2019-12-30

## 2019-12-30 PROBLEM — R79.9 ABNORMAL FINDING OF BLOOD CHEMISTRY, UNSPECIFIED: Status: ACTIVE | Noted: 2019-12-30

## 2019-12-30 PROBLEM — Z12.5 ENCOUNTER FOR SCREENING FOR MALIGNANT NEOPLASM OF PROSTATE: Status: ACTIVE | Noted: 2019-12-30

## 2019-12-30 PROBLEM — Z00.00 PREVENTATIVE HEALTH CARE: Status: ACTIVE | Noted: 2019-12-30

## 2019-12-30 NOTE — ASSESSMENT & PLAN NOTE
Patient has degenerative joint disease mainly involving the axial skeleton and at times the peripheral skeleton.  Knees ankles feet hands all get involved at some time with arthritic pain.  None of it is crippling in any way and he can pretty much do what he wants to on most days

## 2019-12-30 NOTE — PATIENT INSTRUCTIONS
Medicare Wellness  Personal Prevention Plan of Service     Date of Office Visit:  2019  Encounter Provider:  Blake Giraldo MD  Place of Service:  [facility]  Patient Name: Willis Lechuga  :  1935    As part of the Medicare Wellness portion of your visit today, we are providing you with this personalized preventive plan of services (PPPS). This plan is based upon recommendations of the [place] Preventive Services Task Force (USPSTF) and the Advisory Committee on Immunization Practices (ACIP).    This lists the preventive care services that should be considered, and provides dates of when you are due. Items listed as completed are up-to-date and do not require any further intervention.    Health Maintenance   Topic Date Due   • TDAP/TD VACCINES (1 - Tdap) 1946   • ZOSTER VACCINE (1 of 2) 1985   • Pneumococcal Vaccine Once at 65 Years Old  2000   • MEDICARE ANNUAL WELLNESS  2020   • LIPID PANEL  2020   • INFLUENZA VACCINE  Completed       Orders Placed This Encounter   Procedures   • Lipid Panel   • Hemoglobin A1c   • Comprehensive Metabolic Panel   • PSA Screen   • Urinalysis With Culture If Indicated - Urine, Clean Catch   • Vitamin B12   • Vitamin D 25 Hydroxy   • TSH   • Sedimentation Rate   • CBC Auto Differential   • CBC & Differential     Order Specific Question:   Manual Differential     Answer:   No       Return in about 4 months (around 2020).

## 2019-12-30 NOTE — ASSESSMENT & PLAN NOTE
Hypertension is improving with treatment.  Continue current treatment regimen.  Dietary sodium restriction.  Weight loss.  Regular aerobic exercise.  Continue current medications.  Blood pressure will be reassessed at the next regular appointment.    Patient's blood pressure was slightly elevated to 1 42/88 on admission to the office but after a few minutes it dropped down to 132/82.  At home most of the time he gets excellent readings.  I do not think we need to change anything yet.  We will follow this at home as long as he stays normal there we will just let it run

## 2019-12-30 NOTE — ASSESSMENT & PLAN NOTE
Patient has gone through the Medicare risk assessment today.  Nothing new or outstanding came out of that survey.  Basically he feels well and for his age he feels he is doing amazing.  He will consider Cologuard from this point forward to screen for colon cancer.  We will watch his PSA and only if it steadily goes up will we consider sending him off for biopsy.  He is slightly elevated this year but not enough that we have to rush off for biopsy.  He has minimal symptoms.  Immunizations are up-to-date for the most part.

## 2019-12-30 NOTE — ASSESSMENT & PLAN NOTE
Psychological condition is improving with lifestyle modifications.  Regular aerobic exercise.  Psychological condition  will be reassessed at the next regular appointment.    Patient has a long history of some anxiety at times.  For the most part though as long as he can exercise and stay active he does not worry any more than anyone else I do not think.  He is concerned about a son who has Alzheimer's and his wife who has chronic illness.  He does not worry too much about his own health.  He feels well and seems to be the caretaker of the family.

## 2019-12-30 NOTE — ASSESSMENT & PLAN NOTE
Most of his low back pain is due to degenerative disc and degenerative joint disease.  As long as he keeps his weight down and his core muscles strong he does not have too much trouble.  He has to be careful with lifting.

## 2019-12-30 NOTE — ASSESSMENT & PLAN NOTE
Patient has a bony protuberance of his left second metatarsal head.  It is because the callus on the bottom of that foot.  He probably needs that looked at by a orthopedic surgeon who specializes in the foot.  Dr. Noyola will be consulted.  Hopefully no surgery but if it needs to be done I think Dr. Skinner would be the appropriate doctor to do it and not a podiatrist.  The podiatrist could probably take care of the callus but I would not want him doing the surgery on the bone.

## 2019-12-30 NOTE — ASSESSMENT & PLAN NOTE
Previous blood chemistry showed elevated blood sugar.  This was a non-fasting level but it was suspicious for prediabetic range problem.  Also electrolytes have been abnormal on several occasions.  These will be rechecked today

## 2019-12-30 NOTE — ASSESSMENT & PLAN NOTE
Lipid abnormalities are improving with treatment.  Nutritional counseling was provided. and Pharmacotherapy as ordered.  Lipids will be reassessed in 6 months.    Low-carb diet and continue Lipitor 20.  Recheck lipid level today

## 2019-12-30 NOTE — ASSESSMENT & PLAN NOTE
[facility] Progress Note    Date: 2017    Name: Wendy Blum    MRN: [id number]         : 1959      Patient assessed and education was provided. Patient vitals were reviewed. Visit Vitals    /72 (BP 1 Location: Left arm, BP Patient Position: At rest)    Pulse 62    Temp 98.5 °F (36.9 °C)    Resp 18    SpO2 100%       Lab results were obtained and reviewed. Right chest medi-port accessed. Blood permit signed by patient      Pre-medications were administered as ordered and blood transfusion was initiated. 2 units prbc was infused per protocol    Patient  tolerated infusion, and had no complaints. Declined to stay for observation    Patient Vitals for the past 8 hrs:   Temp Pulse Resp BP SpO2   17 1245 98.5 °F (36.9 °C) 62 18 133/72 100 %   17 1155 98.5 °F (36.9 °C) 60 18 125/75 -   17 1125 98.6 °F (37 °C) 62 18 139/67 -   17 1110 98.5 °F (36.9 °C) (!) 58 18 121/72 -   17 1055 98.5 °F (36.9 °C) (!) 56 18 124/74 -   17 1040 98.6 °F (37 °C) (!) 57 18 120/69 -   17 0938 98.6 °F (37 °C) 60 18 115/68 -   17 0907 98.6 °F (37 °C) 65 18 114/71 -   17 0853 98.8 °F (37.1 °C) 69 18 119/71 -   17 0838 98.9 °F (37.2 °C) 71 18 124/70 98 %   17 0803 98.9 °F (37.2 °C) 76 18 145/73 97 %       Brisk flush/blood returns from medi-port. Port heparinized per  Protocol, then de-accessed. Site s signs of infection or infiltration. bandaid applied to site     Patient was discharged from [place] [address] in stable condition at 1300. He has no further appointment with [facility].     Robbie Zhang RN  2017  2:14 PM Recheck vitamin D level today and make adjustments if needed

## 2020-01-02 DIAGNOSIS — M77.42 METATARSALGIA OF LEFT FOOT: ICD-10-CM

## 2020-01-02 DIAGNOSIS — R97.20 ELEVATED PSA: ICD-10-CM

## 2020-01-02 DIAGNOSIS — E03.9 ACQUIRED HYPOTHYROIDISM: ICD-10-CM

## 2020-01-02 DIAGNOSIS — E55.9 VITAMIN D DEFICIENCY: Primary | ICD-10-CM

## 2020-01-15 ENCOUNTER — OFFICE VISIT (OUTPATIENT)
Dept: PODIATRY | Facility: CLINIC | Age: 85
End: 2020-01-15

## 2020-01-15 VITALS
DIASTOLIC BLOOD PRESSURE: 72 MMHG | HEIGHT: 70 IN | SYSTOLIC BLOOD PRESSURE: 157 MMHG | HEART RATE: 99 BPM | WEIGHT: 201.8 LBS | BODY MASS INDEX: 28.89 KG/M2

## 2020-01-15 DIAGNOSIS — M19.072 ARTHRITIS OF LEFT FOOT: ICD-10-CM

## 2020-01-15 DIAGNOSIS — M20.42 HAMMER TOE OF LEFT FOOT: ICD-10-CM

## 2020-01-15 DIAGNOSIS — M77.42 METATARSALGIA, LEFT FOOT: Primary | ICD-10-CM

## 2020-01-15 DIAGNOSIS — L90.9 FAT PAD ATROPHY OF FOOT: ICD-10-CM

## 2020-01-15 DIAGNOSIS — L84 CALLUS OF FOOT: ICD-10-CM

## 2020-01-15 PROCEDURE — 99203 OFFICE O/P NEW LOW 30 MIN: CPT | Performed by: PODIATRIST
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
## 2020-02-11 ENCOUNTER — OFFICE VISIT (OUTPATIENT)
Dept: PODIATRY | Facility: CLINIC | Age: 85
End: 2020-02-11

## 2020-02-11 VITALS
BODY MASS INDEX: 28.92 KG/M2 | HEART RATE: 111 BPM | WEIGHT: 202 LBS | HEIGHT: 70 IN | DIASTOLIC BLOOD PRESSURE: 71 MMHG | SYSTOLIC BLOOD PRESSURE: 114 MMHG

## 2020-02-11 DIAGNOSIS — M77.42 METATARSALGIA, LEFT FOOT: Primary | ICD-10-CM

## 2020-02-11 DIAGNOSIS — L90.9 FAT PAD ATROPHY OF FOOT: ICD-10-CM

## 2020-02-11 DIAGNOSIS — M19.072 ARTHRITIS OF LEFT FOOT: ICD-10-CM

## 2020-02-11 PROCEDURE — 99213 OFFICE O/P EST LOW 20 MIN: CPT | Performed by: PODIATRIST

## 2020-02-11 RX ORDER — ALFUZOSIN HYDROCHLORIDE 10 MG/1
10 TABLET, EXTENDED RELEASE ORAL DAILY
COMMUNITY
Start: 2020-01-20

## 2020-02-17 ENCOUNTER — LAB (OUTPATIENT)
Dept: LAB | Facility: HOSPITAL | Age: 85
End: 2020-02-17

## 2020-02-17 ENCOUNTER — TRANSCRIBE ORDERS (OUTPATIENT)
Dept: LAB | Facility: HOSPITAL | Age: 85
End: 2020-02-17

## 2020-02-17 DIAGNOSIS — Z12.5 ENCOUNTER FOR SCREENING FOR MALIGNANT NEOPLASM OF PROSTATE: ICD-10-CM

## 2020-02-17 DIAGNOSIS — R97.20 ELEVATED PSA: Primary | ICD-10-CM

## 2020-02-17 DIAGNOSIS — R97.20 ELEVATED PSA: ICD-10-CM

## 2020-02-17 LAB — PSA SERPL-MCNC: 5.26 NG/ML (ref 0–4)

## 2020-02-17 PROCEDURE — 36415 COLL VENOUS BLD VENIPUNCTURE: CPT

## 2020-02-17 PROCEDURE — G0103 PSA SCREENING: HCPCS

## 2020-03-10 ENCOUNTER — DOCUMENTATION (OUTPATIENT)
Dept: PHYSICAL THERAPY | Facility: CLINIC | Age: 85
End: 2020-03-10

## 2020-03-10 NOTE — PROGRESS NOTES
Discharge Summary  Discharge Summary from Physical/Occupational Therapy Report    Patient: Willis Lechuga   : 1935  Today's Date: 3/10/2020    Patient seen for 7 visits.    Discharge Status of Patient: See treatment note dated 19    Goals: All Met      Comments : Pt has not returned for additional therapy since 19 and will be DC from our services.      SIGNATURE: Baljit Joseph PT

## 2020-04-27 ENCOUNTER — OFFICE VISIT (OUTPATIENT)
Dept: FAMILY MEDICINE CLINIC | Facility: CLINIC | Age: 85
End: 2020-04-27

## 2020-04-27 ENCOUNTER — LAB (OUTPATIENT)
Dept: FAMILY MEDICINE CLINIC | Facility: CLINIC | Age: 85
End: 2020-04-27

## 2020-04-27 VITALS
HEIGHT: 70 IN | DIASTOLIC BLOOD PRESSURE: 70 MMHG | RESPIRATION RATE: 16 BRPM | OXYGEN SATURATION: 94 % | WEIGHT: 203 LBS | BODY MASS INDEX: 29.06 KG/M2 | HEART RATE: 93 BPM | SYSTOLIC BLOOD PRESSURE: 140 MMHG | TEMPERATURE: 98.6 F

## 2020-04-27 DIAGNOSIS — J84.10 PULMONARY FIBROSIS (HCC): Primary | ICD-10-CM

## 2020-04-27 DIAGNOSIS — R97.20 ELEVATED PROSTATE SPECIFIC ANTIGEN (PSA): ICD-10-CM

## 2020-04-27 DIAGNOSIS — E55.9 VITAMIN D DEFICIENCY: ICD-10-CM

## 2020-04-27 DIAGNOSIS — E03.9 ACQUIRED HYPOTHYROIDISM: ICD-10-CM

## 2020-04-27 LAB
25(OH)D3 SERPL-MCNC: 36.2 NG/ML (ref 30–100)
PSA SERPL-MCNC: 6.38 NG/ML (ref 0–4)
T4 FREE SERPL-MCNC: 1.33 NG/DL (ref 0.93–1.7)
TSH SERPL DL<=0.05 MIU/L-ACNC: 4.36 UIU/ML (ref 0.27–4.2)

## 2020-04-27 PROCEDURE — 84443 ASSAY THYROID STIM HORMONE: CPT | Performed by: FAMILY MEDICINE

## 2020-04-27 PROCEDURE — 82306 VITAMIN D 25 HYDROXY: CPT | Performed by: FAMILY MEDICINE

## 2020-04-27 PROCEDURE — 84439 ASSAY OF FREE THYROXINE: CPT | Performed by: FAMILY MEDICINE

## 2020-04-27 PROCEDURE — 99213 OFFICE O/P EST LOW 20 MIN: CPT | Performed by: FAMILY MEDICINE

## 2020-04-27 PROCEDURE — 84153 ASSAY OF PSA TOTAL: CPT | Performed by: FAMILY MEDICINE

## 2020-04-27 NOTE — ASSESSMENT & PLAN NOTE
Patient has very obvious pulmonary fibrosis.  Idiopathic.  May need oxygen soon.   We need to check his nocturnal oxy sat levels.

## 2020-04-27 NOTE — ASSESSMENT & PLAN NOTE
Recheck TSH and free T4 today.  Back in December and January when we checked his thyroid last his TSH was up to 11.  We increased his thyroid replacement and hopefully TSH is back down to normal.

## 2020-04-27 NOTE — PATIENT INSTRUCTIONS
Pulmonary Fibrosis    Pulmonary fibrosis is a type of lung disease that causes scarring. Over time, the scar tissue builds up in the air sacs of your lungs (alveoli). This makes it hard for you to breathe. Less oxygen can get into your blood.  Scarring from pulmonary fibrosis gets worse over time. This damage is permanent and may lead to other serious health problems.  What are the causes?  There are many different causes of pulmonary fibrosis. Sometimes the cause is not known. This is called idiopathic pulmonary fibrosis. Other causes include:  · Exposure to chemicals and substances found in agricultural, farm, construction, or factory work. These include mold, asbestos, silica, metal dusts, and toxic fumes.  · Sarcoidosis. In this disease, areas of inflammatory cells (granulomas) form and most often affect the lungs.  · Autoimmune diseases. These include diseases such as rheumatoid arthritis, systemic sclerosis, or connective tissue disease.  · Taking certain medicines. These include drugs used in radiation therapy or used to treat seizures, heart problems, and some infections.  What increases the risk?  You are more likely to develop this condition if:  · You have a family history of the disease.  · You are older. The condition is more common in older adults.  · You have a history of smoking.  · You have a job that exposes you to certain chemicals.  · You have gastroesophageal reflux disease (GERD).  What are the signs or symptoms?  Symptoms of this condition include:  · Difficulty breathing that gets worse with activity.  · Shortness of breath (dyspnea).  · Dry, hacking cough.  · Rapid, shallow breathing during exercise or while at rest.  · Bluish skin and lips.  · Loss of appetite.  · Weakness.  · Weight loss and fatigue.  · Rounded and enlarged fingertips (clubbing).  How is this diagnosed?  This condition may be diagnosed based on:  · Your symptoms and medical history.  · A physical exam.  You may also have  tests, including:  · A test that involves looking inside your lungs with an instrument (bronchoscopy).  · Imaging studies of your lungs and heart.  · Tests to measure how well you are breathing (pulmonary function tests).  · Blood tests.  · Tests to see how well your lungs work while you are walking (pulmonary stress test).  · A procedure to remove a lung tissue sample to look at it under a microscope (biopsy).  How is this treated?  There is no cure for pulmonary fibrosis. Treatment focuses on managing symptoms and preventing scarring from getting worse. This may include:  · Medicines, such as:  ? Steroids to prevent permanent lung changes.  ? Medicines to suppress your body's defense system (immune system).  ? Medicines to help with lung function by reducing inflammation or scarring.  · Ongoing monitoring with X-rays and lab work.  · Oxygen therapy.  · Pulmonary rehabilitation.  · Surgery. In some cases, a lung transplant is possible.  Follow these instructions at home:         Medicines  · Take over-the-counter and prescription medicines only as told by your health care provider.  · Keep your vaccinations up to date as recommended by your health care provider.  General instructions  · Do not use any products that contain nicotine or tobacco, such as cigarettes and e-cigarettes. If you need help quitting, ask your health care provider.  · Get regular exercise, but do not overexert yourself. Ask your health care provider to suggest some activities that are safe for you to do.  ? If you have physical limitations, you may get exercise by walking, using a stationary bike, or doing chair exercises.  ? Ask your health care provider about using oxygen while exercising.  · If you are exposed to chemicals and substances at work, make sure that you wear a mask or respirator at all times.  · Join a pulmonary rehabilitation program or a support group for people with pulmonary fibrosis.  · Eat small meals often so you do not  get too full. Overeating can make breathing trouble worse.  · Maintain a healthy weight. Lose weight if you need to.  · Do breathing exercises as directed by your health care provider.  · Keep all follow-up visits as told by your health care provider. This is important.  Contact a health care provider if you:  · Have symptoms that do not get better with medicines.  · Are not able to be as active as usual.  · Have trouble taking a deep breath.  · Have a fever or chills.  · Have blue lips or skin.  · Have clubbing of your fingers.  Get help right away if you:  · Have a sudden worsening of your symptoms.  · Have chest pain.  · Cough up mucus that is dark in color.  · Have a lot of headaches.  · Get very confused or sleepy.  Summary  · Pulmonary fibrosis is a type of lung disease that causes scar tissue to build up in the air sacs of your lungs (alveoli) over time. Less oxygen can get into your blood. This makes it hard for you to breathe.  · Scarring from pulmonary fibrosis gets worse over time. This damage is permanent and may lead to other serious health problems.  · You are more likely to develop this condition if you have a family history of the condition or a job that exposes you to certain chemicals.  · There is no cure for pulmonary fibrosis. Treatment focuses on managing symptoms and preventing scarring from getting worse.  This information is not intended to replace advice given to you by your health care provider. Make sure you discuss any questions you have with your health care provider.  Document Released: 03/09/2005 Document Revised: 01/23/2019 Document Reviewed: 01/23/2019  [employer] Patient Education © 2020 [employer]

## 2020-04-28 ENCOUNTER — TELEPHONE (OUTPATIENT)
Dept: FAMILY MEDICINE CLINIC | Facility: CLINIC | Age: 85
End: 2020-04-28

## 2020-04-28 NOTE — TELEPHONE ENCOUNTER
----- Message from Blake Giraldo MD sent at 4/28/2020  4:01 PM EDT -----  Tell Willis that his TSH is almost back to normal.  I do not think we will go any higher though on the thyroid replacement right now.  He is on just about the right amount and I think this is enough for now.  We will repeat this level in 6 months and see how he is doing.    The PSA is still elevated.  It is gone up to about 6 but Dr. Kothari knows about it and I do not think we need to go checking this any further.    The vitamin D level is now therapeutic.  Stay on replacement.

## 2020-08-17 ENCOUNTER — TRANSCRIBE ORDERS (OUTPATIENT)
Dept: ADMINISTRATIVE | Facility: HOSPITAL | Age: 85
End: 2020-08-17

## 2020-08-17 ENCOUNTER — LAB (OUTPATIENT)
Dept: LAB | Facility: HOSPITAL | Age: 85
End: 2020-08-17

## 2020-08-17 DIAGNOSIS — R97.20 ELEVATED PSA: Primary | ICD-10-CM

## 2020-08-17 DIAGNOSIS — R97.20 ELEVATED PSA: ICD-10-CM

## 2020-08-17 DIAGNOSIS — R06.00 DYSPNEA, UNSPECIFIED TYPE: Primary | ICD-10-CM

## 2020-08-17 PROCEDURE — 36415 COLL VENOUS BLD VENIPUNCTURE: CPT

## 2020-08-17 PROCEDURE — G0103 PSA SCREENING: HCPCS

## 2020-08-18 ENCOUNTER — TRANSCRIBE ORDERS (OUTPATIENT)
Dept: SURGERY | Facility: CLINIC | Age: 85
End: 2020-08-18

## 2020-08-18 DIAGNOSIS — Z01.818 OTHER SPECIFIED PRE-OPERATIVE EXAMINATION: Primary | ICD-10-CM

## 2020-08-18 LAB — PSA SERPL-MCNC: 5.93 NG/ML (ref 0–4)

## 2020-08-20 ENCOUNTER — OFFICE VISIT (OUTPATIENT)
Dept: FAMILY MEDICINE CLINIC | Facility: CLINIC | Age: 85
End: 2020-08-20

## 2020-08-20 VITALS
OXYGEN SATURATION: 92 % | WEIGHT: 201 LBS | RESPIRATION RATE: 16 BRPM | HEIGHT: 70 IN | DIASTOLIC BLOOD PRESSURE: 68 MMHG | TEMPERATURE: 97.3 F | BODY MASS INDEX: 28.77 KG/M2 | HEART RATE: 104 BPM | SYSTOLIC BLOOD PRESSURE: 140 MMHG

## 2020-08-20 DIAGNOSIS — J84.112 IDIOPATHIC PULMONARY FIBROSIS (HCC): Primary | ICD-10-CM

## 2020-08-20 PROCEDURE — 99213 OFFICE O/P EST LOW 20 MIN: CPT | Performed by: FAMILY MEDICINE

## 2020-08-20 NOTE — PROGRESS NOTES
"Rooming Tab(CC,VS,Pt Hx,Fall Screen)  Chief Complaint   Patient presents with   • Prostate Check   • Shortness of Breath       Subjective   +Willis is a 85-year-old white male who was diagnosed with idiopathic pulmonary  fibrosis several years ago.  He comes in today interested in a new treatment using metformin and a biologic.  He has some references and is interested in trying to find someone locally who could prescribe the combination medications.  I have reviewed and updated his medications, medical history and problem list during today's office visit.     Patient Care Team:  Blake Giraldo MD as PCP - General (Family Medicine)  Banet, Duane Edward, MD as PCP - Claims Attributed    Problem List Tab  Medications Tab  Synopsis Tab  Chart Review Tab  Care Everywhere Tab  Immunizations Tab  Patient History Tab    Social History     Tobacco Use   • Smoking status: Never Smoker   • Smokeless tobacco: Never Used   Substance Use Topics   • Alcohol use: Never     Alcohol/week: 1.0 standard drinks     Types: 1 Cans of beer per week     Frequency: Never     Comment: occasionally       Review of Systems   Constitutional: Positive for fatigue. Negative for fever.   HENT: Negative for ear pain, rhinorrhea, sore throat and swollen glands.    Respiratory: Positive for choking, chest tightness and wheezing.    Cardiovascular: Positive for chest pain. Negative for leg swelling.   Gastrointestinal: Negative for abdominal pain and vomiting.   Musculoskeletal: Negative for neck pain.   Skin: Negative for rash.   Neurological: Positive for weakness.       Objective     Rooming Tab(CC,VS,Pt Hx,Fall Screen)  /68 (BP Location: Right arm, Cuff Size: Large Adult)   Pulse 104   Temp 97.3 °F (36.3 °C)   Resp 16   Ht 176.5 cm (69.5\")   Wt 91.2 kg (201 lb)   SpO2 92%   BMI 29.26 kg/m²     Body mass index is 29.26 kg/m².    Physical Exam   Constitutional: He is oriented to person, place, and time. He appears well-developed and " well-nourished.   HENT:   Head: Normocephalic and atraumatic.   Right Ear: External ear normal.   Left Ear: External ear normal.   Nose: Nose normal.   Mouth/Throat: Oropharynx is clear and moist. No oropharyngeal exudate.   Eyes: Pupils are equal, round, and reactive to light. Conjunctivae and EOM are normal. Right eye exhibits no discharge. Left eye exhibits no discharge. No scleral icterus.   Neck: Normal range of motion. Neck supple. No JVD present. No thyromegaly present.   Cardiovascular: Normal rate, regular rhythm, normal heart sounds and intact distal pulses.   No murmur heard.  Pulmonary/Chest: Effort normal. No respiratory distress. He has no wheezes. He has no rales. He exhibits no tenderness.   End inspiratory crackling rales throughout both lung fields particularly in the bases.  No areas of consolidation.  Not any wheezing just a crackling noise throughout his lung with inspiration   Abdominal: Soft. Bowel sounds are normal. He exhibits no distension. There is no tenderness.   Genitourinary: Rectal exam shows guaiac negative stool.   Musculoskeletal: Normal range of motion. He exhibits no edema, tenderness or deformity.   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   Neurological: He is alert and oriented to person, place, and time.   Skin: Skin is warm and dry.   Psychiatric: He has a normal mood and affect. His behavior is normal. Judgment and thought content normal.   Vitals reviewed.       Statin Choice Calculator  Data Reviewed:               Lab Results   Component Value Date    PSA 5.930 (H) 08/17/2020      Assessment/Plan   Order Review Tab  Health Maintenance Tab  Patient Plan/Order Tab  Diagnoses and all orders for this visit:    1. Idiopathic pulmonary fibrosis (CMS/HCC) (Primary)  Assessment & Plan:  We need him to see pulmonary specialist.   He sees Dr. Vincent.   I will review the articles that he has sent me but I am afraid he is probably  going to have to go to a research center if he really wants to pursue these new treatments.  I am not sure anyone around here is going to be willing to prescribe him medicines that are basically trial medicines.  Patient is doing well considering his age.          Wrapup Tab  Return in about 3 months (around 11/20/2020) for Recheck.                 Answers for HPI/ROS submitted by the patient on 8/19/2020   Shortness of breath  What is the primary reason for your visit?: Shortness of Breath  Chronicity: recurrent  Onset: more than 1 month ago  Frequency: daily  Progression since onset: gradually worsening  Episode duration: 10 minutes  claudication: No  coryza: No  headaches: No  hemoptysis: No  leg pain: No  orthopnea: No  PND: No  sputum production: Yes  syncope: No  Aggravating factors: exercise, any activity, fumes

## 2020-08-20 NOTE — ASSESSMENT & PLAN NOTE
We need him to see pulmonary specialist.   He sees Dr. Vincent.   I will review the articles that he has sent me but I am afraid he is probably going to have to go to a research center if he really wants to pursue these new treatments.  I am not sure anyone around here is going to be willing to prescribe him medicines that are basically trial medicines.  Patient is doing well considering his age.

## 2020-08-30 NOTE — PATIENT INSTRUCTIONS
Pulmonary Fibrosis    Pulmonary fibrosis is a type of lung disease that causes scarring. Over time, the scar tissue builds up in the air sacs of your lungs (alveoli). This makes it hard for you to breathe. Less oxygen can get into your blood.  Scarring from pulmonary fibrosis gets worse over time. This damage is permanent and may lead to other serious health problems.  What are the causes?  There are many different causes of pulmonary fibrosis. Sometimes the cause is not known. This is called idiopathic pulmonary fibrosis. Other causes include:  · Exposure to chemicals and substances found in agricultural, farm, construction, or factory work. These include mold, asbestos, silica, metal dusts, and toxic fumes.  · Sarcoidosis. In this disease, areas of inflammatory cells (granulomas) form and most often affect the lungs.  · Autoimmune diseases. These include diseases such as rheumatoid arthritis, systemic sclerosis, or connective tissue disease.  · Taking certain medicines. These include drugs used in radiation therapy or used to treat seizures, heart problems, and some infections.  What increases the risk?  You are more likely to develop this condition if:  · You have a family history of the disease.  · You are older. The condition is more common in older adults.  · You have a history of smoking.  · You have a job that exposes you to certain chemicals.  · You have gastroesophageal reflux disease (GERD).  What are the signs or symptoms?  Symptoms of this condition include:  · Difficulty breathing that gets worse with activity.  · Shortness of breath (dyspnea).  · Dry, hacking cough.  · Rapid, shallow breathing during exercise or while at rest.  · Bluish skin and lips.  · Loss of appetite.  · Weakness.  · Weight loss and fatigue.  · Rounded and enlarged fingertips (clubbing).  How is this diagnosed?  This condition may be diagnosed based on:  · Your symptoms and medical history.  · A physical exam.  You may also have  tests, including:  · A test that involves looking inside your lungs with an instrument (bronchoscopy).  · Imaging studies of your lungs and heart.  · Tests to measure how well you are breathing (pulmonary function tests).  · Blood tests.  · Tests to see how well your lungs work while you are walking (pulmonary stress test).  · A procedure to remove a lung tissue sample to look at it under a microscope (biopsy).  How is this treated?  There is no cure for pulmonary fibrosis. Treatment focuses on managing symptoms and preventing scarring from getting worse. This may include:  · Medicines, such as:  ? Steroids to prevent permanent lung changes.  ? Medicines to suppress your body's defense system (immune system).  ? Medicines to help with lung function by reducing inflammation or scarring.  · Ongoing monitoring with X-rays and lab work.  · Oxygen therapy.  · Pulmonary rehabilitation.  · Surgery. In some cases, a lung transplant is possible.  Follow these instructions at home:         Medicines  · Take over-the-counter and prescription medicines only as told by your health care provider.  · Keep your vaccinations up to date as recommended by your health care provider.  General instructions  · Do not use any products that contain nicotine or tobacco, such as cigarettes and e-cigarettes. If you need help quitting, ask your health care provider.  · Get regular exercise, but do not overexert yourself. Ask your health care provider to suggest some activities that are safe for you to do.  ? If you have physical limitations, you may get exercise by walking, using a stationary bike, or doing chair exercises.  ? Ask your health care provider about using oxygen while exercising.  · If you are exposed to chemicals and substances at work, make sure that you wear a mask or respirator at all times.  · Join a pulmonary rehabilitation program or a support group for people with pulmonary fibrosis.  · Eat small meals often so you do not  get too full. Overeating can make breathing trouble worse.  · Maintain a healthy weight. Lose weight if you need to.  · Do breathing exercises as directed by your health care provider.  · Keep all follow-up visits as told by your health care provider. This is important.  Contact a health care provider if you:  · Have symptoms that do not get better with medicines.  · Are not able to be as active as usual.  · Have trouble taking a deep breath.  · Have a fever or chills.  · Have blue lips or skin.  · Have clubbing of your fingers.  Get help right away if you:  · Have a sudden worsening of your symptoms.  · Have chest pain.  · Cough up mucus that is dark in color.  · Have a lot of headaches.  · Get very confused or sleepy.  Summary  · Pulmonary fibrosis is a type of lung disease that causes scar tissue to build up in the air sacs of your lungs (alveoli) over time. Less oxygen can get into your blood. This makes it hard for you to breathe.  · Scarring from pulmonary fibrosis gets worse over time. This damage is permanent and may lead to other serious health problems.  · You are more likely to develop this condition if you have a family history of the condition or a job that exposes you to certain chemicals.  · There is no cure for pulmonary fibrosis. Treatment focuses on managing symptoms and preventing scarring from getting worse.  This information is not intended to replace advice given to you by your health care provider. Make sure you discuss any questions you have with your health care provider.  Document Released: 03/09/2005 Document Revised: 01/23/2019 Document Reviewed: 01/23/2019  [employer] Patient Education © 2020 [employer]

## 2020-09-08 ENCOUNTER — TELEPHONE (OUTPATIENT)
Dept: FAMILY MEDICINE CLINIC | Facility: CLINIC | Age: 85
End: 2020-09-08

## 2020-09-08 RX ORDER — LEVOTHYROXINE SODIUM 88 UG/1
TABLET ORAL
Qty: 90 TABLET | Refills: 3 | Status: SHIPPED | OUTPATIENT
Start: 2020-09-08 | End: 2021-08-31

## 2020-09-08 NOTE — TELEPHONE ENCOUNTER
PATIENT CALLED TO GET SOME FEEDBACK FROM DR WOLF  ABOUT THE DRUG THERAPY TRIAL OF COMBINING METFORMIN TO TREAT PULMONARY FIBROSIS.   ALSO WOULD LIKE TO DISCUSS A MEDICATION FOR VERTIGO    PLEASE ADVISE    [phone]

## 2020-09-09 DIAGNOSIS — R42 VERTIGO: Primary | ICD-10-CM

## 2020-09-22 ENCOUNTER — TREATMENT (OUTPATIENT)
Dept: PHYSICAL THERAPY | Facility: CLINIC | Age: 85
End: 2020-09-22

## 2020-09-22 DIAGNOSIS — R42 DIZZINESS AND GIDDINESS: Primary | ICD-10-CM

## 2020-09-22 PROCEDURE — 97161 PT EVAL LOW COMPLEX 20 MIN: CPT | Performed by: PHYSICAL THERAPIST

## 2020-09-22 PROCEDURE — 97112 NEUROMUSCULAR REEDUCATION: CPT | Performed by: PHYSICAL THERAPIST

## 2020-09-22 PROCEDURE — 95992 CANALITH REPOSITIONING PROC: CPT | Performed by: PHYSICAL THERAPIST

## 2020-09-22 NOTE — PROGRESS NOTES
Physical Therapy Initial Evaluation and Plan of Care    Patient: Willis Lechuga   : 1935  Diagnosis/ICD-10 Code:  Dizziness and giddiness [R42]  Referring practitioner: Blake Giraldo MD  Date of Initial Visit: 2020  Today's Date: 2020  Patient seen for 1 sessions           Subjective Questionnaire: NDI: 64%      Subjective Evaluation    History of Present Illness  Mechanism of injury: Pt is referred to therapy due to Vertigo. Has been having symptoms for the past couple of months. It is getting worse. Reports spinning with looking up/down/ bending over/ rolling in bed/ sit to stand and lying down. Feels unsteady at night when gets up to go to the BR. Has night lights and keeps the path clear from the bed to the BR. Denies ringing in the ears, no vision problems. Has been Dx with sleep apnea and has been using the CPAP machine.       Patient Occupation: retire Quality of life: good    Pain  No pain reported    Social Support  Lives in: one-story house  Lives with: spouse    Treatments  Previous treatment: physical therapy  Patient Goals  Patient goal: resolve dizziness         Precautions: none    Objective          Functional Assessment     Comments  Additional subjective information:              Vision problems/correction: wears glasses for driving   Hearing problems: none   History of falls: none      Symptoms last a matter of:  Few secs   Symptoms feel like:  spinning         Objective:     Oculomotor and VOR:      Spontaneous nystagmus: neg    Smooth pursuit: neg                Cervical AROM: wfl     Vertebral artery screen: neg B     BPPV Assessment:    Roll Test:   NT               [place]ie Hallpike:  Right: pos for up beating nystagmus lasting for about 10'/  Dizziness upon sitting up                          Left: neg for any subjective co or nystagmus     Rx: R Epley maneuver , f/b post op instructions for 24 hrs. Pt is familiar with post op precautions and able to verbalize                         Assessment & Plan     Assessment  Impairments: activity intolerance  Assessment details: Pt is a 84 y/o f referred to therapy due to recurrent vertigo and BPPV . Pt is well known to me and has responded well to therapy in the past.. Pt presents with signs and symptoms of BPPV. . Pt presents with dizziness with positional changes. Upon initial evaluation pt exhibits the above impairments and functional limitations. Impairments affect daily activities and every day activities. Pt will benefit from skilled physical therapy to address impairments, resolve dizziness and maximize function.   Prognosis: good    Goals  Plan Goals: STGs:  1- Pt will repot at least 50 % improvement and symptom reduction in 2 weeks  2- Pt will be independent with HEP  3- DHP/Role test will be neg B       LTGs:   1- Pt will report 100 % improvement by DC   2- Pt will be independent with self management of his condition by DC   3- Pt will have improved DHI score compare to initial score at eval by DC       Plan  Therapy options: will be seen for skilled physical therapy services  Planned therapy interventions: home exercise program, therapeutic activities and neuromuscular re-education  Frequency: 1x week  Treatment plan discussed with: patient  Plan details: 20 visits        See flow sheet for treatment detail    History # of Personal Factors and/or Comorbidities: LOW (0)  Examination of Body System(s): # of elements: LOW (1-2)  Clinical Presentation: EVOLVING  Clinical Decision Making: LOW           Timed:         Manual Therapy:         mins  49564;     Therapeutic Exercise:         mins  06236;     Neuromuscular Katherin:  10      mins  84474;    Therapeutic Activity:          mins  02081;     Gait Training:           mins  42113;     Ultrasound:          mins  73172;    Ionto                                   mins   55001  Self Care                            mins   75518  Canal repositioning       15    mins     17602      Un-Timed:  Electrical Stimulation:         mins  34494 ( );  Dry Needling          mins self-pay  Traction          mins 53604  Low Eval    20      Mins  29387  Mod Eval          Mins  37905  High Eval                            Mins  37433  Re-Eval                               mins  14717        Timed Treatment:  25    mins   Total Treatment:    45    mins    PT SIGNATURE: Baljit Joseph PT, CLT   DATE TREATMENT INITIATED: 9/22/2020    Initial Certification  Certification Period: 12/21/2020  I certify that the therapy services are furnished while this patient is under my care.  The services outlined above are required by this patient, and will be reviewed every 90 days.     PHYSICIAN: Blake Giraldo MD      DATE:     Please sign and return via fax to [phone].. Thank you, [facility].

## 2020-09-29 ENCOUNTER — TREATMENT (OUTPATIENT)
Dept: PHYSICAL THERAPY | Facility: CLINIC | Age: 85
End: 2020-09-29

## 2020-09-29 DIAGNOSIS — H81.10 BENIGN PAROXYSMAL POSITIONAL VERTIGO, UNSPECIFIED LATERALITY: ICD-10-CM

## 2020-09-29 DIAGNOSIS — R42 DIZZINESS AND GIDDINESS: Primary | ICD-10-CM

## 2020-09-29 PROCEDURE — 97112 NEUROMUSCULAR REEDUCATION: CPT | Performed by: PHYSICAL THERAPIST

## 2020-09-29 NOTE — PROGRESS NOTES
Physical Therapy Daily Progress Note    Patient: Willis Lechuga  : 1935  Referring practitioner: Blake Giraldo MD  Today's Date: 2020    VISIT#: 2    Subjective   Pt reports: has been feeling better it is much better but is not completely gone. Felt good the day of the treatment and the next day but he started feeling it again off and on the next day mainly at night when he gets up to go to the rest room feels off balance. Has night lights but it still is dark.       Objective     DHP: neg B/ no subjective co   Roll test: neg B  Rx:  No treatment required today. Discussed POC. Instructed in modified Epley maneuver as HEP.  Pt demonstrated and verbalized understanding of home instruction.     Patient Education:    Assessment & Plan     Assessment  Assessment details: Both DHP and Roll test neg today.  Has responded well to therapy. Pt demo understanding of home instructions. 2/3 STGs met.     Goals  Plan Goals: Plan Goals: STGs:  1- Pt will repot at least 50 % improvement and symptom reduction in 2 weeks ( met )  2- Pt will be independent with HEP  3- DHP/Role test will be neg B ( met )      LTGs:   1- Pt will report 100 % improvement by DC   2- Pt will be independent with self management of his condition by DC   3- Pt will have improved DHI score compare to initial score at eval by DC            Progress per Plan of Care/ has one more appt scheduled for next week pt to keep if needed or cx.             Timed:         Manual Therapy:         mins  21875;     Therapeutic Exercise:         mins  92293;     Neuromuscular Katherin:   40     mins  03057;    Therapeutic Activity:          mins  86911;     Gait Training:           mins  36354;     Ultrasound:          mins  82685;    Ionto                                   mins   46569  Self Care                            mins   97596  Canal repositioning           mins    61009    Un-Timed:  Electrical Stimulation:         mins  86024 (  );  Traction          mins 28073  Low Eval          Mins  84508  Mod Eval          Mins  43763  High Eval                            Mins  11790  Re-Eval                               mins  75431    Timed Treatment:      mins   Total Treatment:        mins    Baljit Joseph PT, CLT  Physical Therapist

## 2020-10-30 ENCOUNTER — TRANSCRIBE ORDERS (OUTPATIENT)
Dept: PULMONOLOGY | Facility: HOSPITAL | Age: 85
End: 2020-10-30

## 2020-10-30 DIAGNOSIS — Z01.818 OTHER SPECIFIED PRE-OPERATIVE EXAMINATION: Primary | ICD-10-CM

## 2020-11-05 ENCOUNTER — DOCUMENTATION (OUTPATIENT)
Dept: PHYSICAL THERAPY | Facility: CLINIC | Age: 85
End: 2020-11-05

## 2020-11-05 NOTE — PROGRESS NOTES
Discharge Summary  Discharge Summary from Physical/Occupational Therapy Report    Patient: Willis Lechuga   : 1935  Today's Date: 2020    Patient seen for 2 visits.  Dates of Service: 20 to 20    Discharge Status of Patient: See 20 treatment note for detail.    Goals: All Met    Comments : Pt responded well to therapy and cx last 2 scheduled appts since he was not having any symptoms. Will be DC from our services.       Thank you for this referral to [facility].    SIGNATURE: Baljit Joseph, PT

## 2020-11-09 ENCOUNTER — LAB (OUTPATIENT)
Dept: LAB | Facility: HOSPITAL | Age: 85
End: 2020-11-09

## 2020-11-09 DIAGNOSIS — Z01.818 OTHER SPECIFIED PRE-OPERATIVE EXAMINATION: ICD-10-CM

## 2020-11-09 PROCEDURE — C9803 HOPD COVID-19 SPEC COLLECT: HCPCS

## 2020-11-09 PROCEDURE — U0004 COV-19 TEST NON-CDC HGH THRU: HCPCS

## 2020-11-10 LAB — SARS-COV-2 RNA RESP QL NAA+PROBE: DETECTED

## 2020-11-10 RX ORDER — LISINOPRIL 5 MG/1
TABLET ORAL
Qty: 90 TABLET | Refills: 3 | Status: SHIPPED | OUTPATIENT
Start: 2020-11-10 | End: 2021-11-05

## 2020-11-11 ENCOUNTER — HOSPITAL ENCOUNTER (OUTPATIENT)
Dept: RESPIRATORY THERAPY | Facility: HOSPITAL | Age: 85
End: 2020-11-11

## 2020-11-11 ENCOUNTER — TRANSCRIBE ORDERS (OUTPATIENT)
Dept: PULMONOLOGY | Facility: HOSPITAL | Age: 85
End: 2020-11-11

## 2020-11-11 DIAGNOSIS — Z01.818 OTHER SPECIFIED PRE-OPERATIVE EXAMINATION: Primary | ICD-10-CM

## 2020-11-28 ENCOUNTER — APPOINTMENT (OUTPATIENT)
Dept: LAB | Facility: HOSPITAL | Age: 85
End: 2020-11-28

## 2020-12-01 ENCOUNTER — APPOINTMENT (OUTPATIENT)
Dept: RESPIRATORY THERAPY | Facility: HOSPITAL | Age: 85
End: 2020-12-01

## 2020-12-02 ENCOUNTER — LAB (OUTPATIENT)
Dept: LAB | Facility: HOSPITAL | Age: 85
End: 2020-12-02

## 2020-12-02 DIAGNOSIS — Z01.818 OTHER SPECIFIED PRE-OPERATIVE EXAMINATION: ICD-10-CM

## 2020-12-02 PROCEDURE — C9803 HOPD COVID-19 SPEC COLLECT: HCPCS

## 2020-12-02 PROCEDURE — U0004 COV-19 TEST NON-CDC HGH THRU: HCPCS

## 2020-12-03 LAB — SARS-COV-2 RNA RESP QL NAA+PROBE: NOT DETECTED

## 2020-12-04 ENCOUNTER — HOSPITAL ENCOUNTER (OUTPATIENT)
Dept: RESPIRATORY THERAPY | Facility: HOSPITAL | Age: 85
Discharge: HOME | End: 2020-12-04
Admitting: INTERNAL MEDICINE

## 2020-12-04 DIAGNOSIS — R06.00 DYSPNEA, UNSPECIFIED TYPE: ICD-10-CM

## 2020-12-04 PROCEDURE — A9270 NON-COVERED ITEM OR SERVICE: HCPCS | Performed by: INTERNAL MEDICINE

## 2020-12-04 PROCEDURE — 94618 PULMONARY STRESS TESTING: CPT

## 2020-12-04 PROCEDURE — 94729 DIFFUSING CAPACITY: CPT

## 2020-12-04 PROCEDURE — 94060 EVALUATION OF WHEEZING: CPT

## 2020-12-04 PROCEDURE — 63710000001 ALBUTEROL SULFATE HFA 108 (90 BASE) MCG/ACT AEROSOL SOLUTION 18 G INHALER: Performed by: INTERNAL MEDICINE

## 2020-12-04 PROCEDURE — 94726 PLETHYSMOGRAPHY LUNG VOLUMES: CPT

## 2020-12-04 RX ORDER — ALBUTEROL SULFATE 90 UG/1
2 AEROSOL, METERED RESPIRATORY (INHALATION) ONCE
Status: COMPLETED | OUTPATIENT
Start: 2020-12-04 | End: 2020-12-04

## 2020-12-04 RX ADMIN — ALBUTEROL SULFATE 2 PUFF: 90 AEROSOL, METERED RESPIRATORY (INHALATION) at 07:41

## 2020-12-04 NOTE — PROGRESS NOTES
Exercise Oximetry    Patient Name:Willis Lechuga   MRN: [id number]   Date: 12/04/20             ROOM AIR BASELINE   SpO2%     93   Heart Rate      96   Blood Pressure      EXERCISE ON ROOM AIR SpO2% EXERCISE ON O2 @   3 LPM SpO2%   1 MINUTE 87 1 MINUTE 93   2 MINUTES  2 MINUTES 94   3 MINUTES  3 MINUTES 95   4 MINUTES  4 MINUTES 96   5 MINUTES  5 MINUTES 95   6 MINUTES  6 MINUTES 94              Distance Walked  200  feet Distance Walked    1050 feet   Dyspnea (Vi Scale)  Dyspnea (Vi Scale)   Fatigue (Vi Scale)   Fatigue (Vi Scale)   SpO2% Post Exercise   SpO2% Post Exercise    97   HR Post Exercise   HR Post Exercise    88   Time to Recovery    6  minutes Time to Recovery  6  minutes     Comments: Pt  Walked  Steady  Brisk  Pace.  Pt  Had  Oxygen  Saturation  Drop  To  87%.  Began  Pt  At  2 lpm nasal  Cannula.  O2  Saturation  Improved  To  94%.  Walked patient  Again.  Saturations  Dropped  To  91%  After  200  Feet.  Increased  To  3  Lpm  Nc.  O2  Saturation  Improved  To  95%.  Pt  Walked  Remainder  Of  Time  Fully  And  Saturations  Maintained  At  94-96%  On  3  Lpm. Amelia Kothari, RRT

## 2020-12-09 ENCOUNTER — TRANSCRIBE ORDERS (OUTPATIENT)
Dept: LAB | Facility: HOSPITAL | Age: 85
End: 2020-12-09

## 2020-12-09 ENCOUNTER — LAB (OUTPATIENT)
Dept: LAB | Facility: HOSPITAL | Age: 85
End: 2020-12-09

## 2020-12-09 DIAGNOSIS — J84.112 IDIOPATHIC FIBROSING ALVEOLITIS, SUBACUTE FORM (HCC): Primary | ICD-10-CM

## 2020-12-09 DIAGNOSIS — J84.112 IDIOPATHIC FIBROSING ALVEOLITIS, SUBACUTE FORM (HCC): ICD-10-CM

## 2020-12-09 LAB
ALBUMIN SERPL-MCNC: 4.1 G/DL (ref 3.5–5.2)
ALBUMIN/GLOB SERPL: 1.3 G/DL
ALP SERPL-CCNC: 55 U/L (ref 39–117)
ALT SERPL W P-5'-P-CCNC: 18 U/L (ref 1–41)
ANION GAP SERPL CALCULATED.3IONS-SCNC: 6.9 MMOL/L (ref 5–15)
AST SERPL-CCNC: 18 U/L (ref 1–40)
BASOPHILS # BLD AUTO: 0.04 10*3/MM3 (ref 0–0.2)
BASOPHILS NFR BLD AUTO: 0.5 % (ref 0–1.5)
BILIRUB SERPL-MCNC: 0.3 MG/DL (ref 0–1.2)
BUN SERPL-MCNC: 17 MG/DL (ref 8–23)
BUN/CREAT SERPL: 18.9 (ref 7–25)
CALCIUM SPEC-SCNC: 9.7 MG/DL (ref 8.6–10.5)
CHLORIDE SERPL-SCNC: 98 MMOL/L (ref 98–107)
CHROMATIN AB SERPL-ACNC: 10.2 IU/ML (ref 0–14)
CO2 SERPL-SCNC: 31.1 MMOL/L (ref 22–29)
CREAT SERPL-MCNC: 0.9 MG/DL (ref 0.76–1.27)
DEPRECATED RDW RBC AUTO: 42.3 FL (ref 37–54)
EOSINOPHIL # BLD AUTO: 0.27 10*3/MM3 (ref 0–0.4)
EOSINOPHIL NFR BLD AUTO: 3.1 % (ref 0.3–6.2)
ERYTHROCYTE [DISTWIDTH] IN BLOOD BY AUTOMATED COUNT: 12.5 % (ref 12.3–15.4)
GFR SERPL CREATININE-BSD FRML MDRD: 80 ML/MIN/1.73
GLOBULIN UR ELPH-MCNC: 3.2 GM/DL
GLUCOSE SERPL-MCNC: 82 MG/DL (ref 65–99)
HCT VFR BLD AUTO: 41.5 % (ref 37.5–51)
HGB BLD-MCNC: 13.3 G/DL (ref 13–17.7)
IMM GRANULOCYTES # BLD AUTO: 0.03 10*3/MM3 (ref 0–0.05)
IMM GRANULOCYTES NFR BLD AUTO: 0.3 % (ref 0–0.5)
LYMPHOCYTES # BLD AUTO: 1.98 10*3/MM3 (ref 0.7–3.1)
LYMPHOCYTES NFR BLD AUTO: 22.6 % (ref 19.6–45.3)
MCH RBC QN AUTO: 29.5 PG (ref 26.6–33)
MCHC RBC AUTO-ENTMCNC: 32 G/DL (ref 31.5–35.7)
MCV RBC AUTO: 92 FL (ref 79–97)
MONOCYTES # BLD AUTO: 0.88 10*3/MM3 (ref 0.1–0.9)
MONOCYTES NFR BLD AUTO: 10 % (ref 5–12)
NEUTROPHILS NFR BLD AUTO: 5.56 10*3/MM3 (ref 1.7–7)
NEUTROPHILS NFR BLD AUTO: 63.5 % (ref 42.7–76)
NRBC BLD AUTO-RTO: 0 /100 WBC (ref 0–0.2)
PLATELET # BLD AUTO: 262 10*3/MM3 (ref 140–450)
PMV BLD AUTO: 10.2 FL (ref 6–12)
POTASSIUM SERPL-SCNC: 4.5 MMOL/L (ref 3.5–5.2)
PROT SERPL-MCNC: 7.3 G/DL (ref 6–8.5)
RBC # BLD AUTO: 4.51 10*6/MM3 (ref 4.14–5.8)
SODIUM SERPL-SCNC: 136 MMOL/L (ref 136–145)
WBC # BLD AUTO: 8.76 10*3/MM3 (ref 3.4–10.8)

## 2020-12-09 PROCEDURE — 86256 FLUORESCENT ANTIBODY TITER: CPT

## 2020-12-09 PROCEDURE — 87385 HISTOPLASMA CAPSUL AG IA: CPT

## 2020-12-09 PROCEDURE — 83520 IMMUNOASSAY QUANT NOS NONAB: CPT

## 2020-12-09 PROCEDURE — 86038 ANTINUCLEAR ANTIBODIES: CPT

## 2020-12-09 PROCEDURE — 80053 COMPREHEN METABOLIC PANEL: CPT

## 2020-12-09 PROCEDURE — 36415 COLL VENOUS BLD VENIPUNCTURE: CPT

## 2020-12-09 PROCEDURE — 82164 ANGIOTENSIN I ENZYME TEST: CPT

## 2020-12-09 PROCEDURE — 86431 RHEUMATOID FACTOR QUANT: CPT

## 2020-12-09 PROCEDURE — 85025 COMPLETE CBC W/AUTO DIFF WBC: CPT

## 2020-12-10 LAB — ACE SERPL-CCNC: 16 U/L (ref 14–82)

## 2020-12-11 LAB
ANA SER QL: NEGATIVE
C-ANCA TITR SER IF: NORMAL TITER
MYELOPEROXIDASE AB SER IA-ACNC: <9 U/ML (ref 0–9)
P-ANCA ATYPICAL TITR SER IF: NORMAL TITER
P-ANCA TITR SER IF: NORMAL TITER
PROTEINASE3 AB SER IA-ACNC: <3.5 U/ML (ref 0–3.5)

## 2020-12-14 LAB
H CAPSUL AG SER QL IA: <0.5
Lab: NORMAL

## 2021-01-06 ENCOUNTER — TELEPHONE (OUTPATIENT)
Dept: FAMILY MEDICINE CLINIC | Facility: CLINIC | Age: 86
End: 2021-01-06

## 2021-01-06 NOTE — TELEPHONE ENCOUNTER
Caller: Willis Lechuga    Relationship: Self    Best call back number: [phone]    When did you start taking these medications: NOT CURRENTLY TAKING IT. PATIENT IS WANTING TO CONSULT BEFORE HE BEGINS THIS COURSE OF TREATMENT.    Which medication are you concerned about: OFEV (NINTEDANIB)     Who prescribed you this medication: DR. MAR (PULMONY SPECIALIST)    What are your concerns:   SIDE EFFECTS INCLUDING LIVER, JUANDICE, DIARREAH

## 2021-01-14 RX ORDER — ATORVASTATIN CALCIUM 20 MG/1
TABLET, FILM COATED ORAL
Qty: 90 TABLET | Refills: 3 | Status: SHIPPED | OUTPATIENT
Start: 2021-01-14 | End: 2022-01-10

## 2021-03-16 ENCOUNTER — TELEPHONE (OUTPATIENT)
Dept: FAMILY MEDICINE CLINIC | Facility: CLINIC | Age: 86
End: 2021-03-16

## 2021-03-16 DIAGNOSIS — R42 VERTIGO: Primary | ICD-10-CM

## 2021-03-16 DIAGNOSIS — R42 VERTIGO: ICD-10-CM

## 2021-03-16 RX ORDER — NINTEDANIB 100 MG/1
100 CAPSULE ORAL 2 TIMES DAILY
COMMUNITY
Start: 2021-03-15

## 2021-03-16 RX ORDER — DIPHENOXYLATE HYDROCHLORIDE AND ATROPINE SULFATE 2.5; .025 MG/1; MG/1
1 TABLET ORAL 4 TIMES DAILY PRN
Qty: 40 TABLET | Refills: 2 | Status: SHIPPED | OUTPATIENT
Start: 2021-03-16 | End: 2021-03-16 | Stop reason: SDUPTHER

## 2021-03-16 RX ORDER — DIPHENOXYLATE HYDROCHLORIDE AND ATROPINE SULFATE 2.5; .025 MG/1; MG/1
1 TABLET ORAL 4 TIMES DAILY PRN
Qty: 40 TABLET | Refills: 2 | Status: SHIPPED | OUTPATIENT
Start: 2021-03-16 | End: 2021-04-15

## 2021-03-16 NOTE — TELEPHONE ENCOUNTER
Caller: Willis Lechuga    Relationship: Self    Best call back number:[phone]    What medication are you requesting:LOMITL    What are your current symptoms: ANTI DIARRHEA DUE TO STARTING HIS OFEV THAT DR MAR WITH PULMONOLOGY       Have you had these symptoms before:    [x] Yes  [] No    Have you been treated for these symptoms before:   [] Yes  [] No    If a prescription is needed, what is your preferred pharmacy and phone number:      [address] - [facility] - [phone] [place] [phone] [phone]  Additional notes:

## 2021-03-22 ENCOUNTER — LAB (OUTPATIENT)
Dept: LAB | Facility: HOSPITAL | Age: 86
End: 2021-03-22

## 2021-03-22 DIAGNOSIS — J84.112 IDIOPATHIC FIBROSING ALVEOLITIS, SUBACUTE FORM (HCC): ICD-10-CM

## 2021-03-22 LAB
ALBUMIN SERPL-MCNC: 3.8 G/DL (ref 3.5–5.2)
ALBUMIN/GLOB SERPL: 1.5 G/DL
ALP SERPL-CCNC: 53 U/L (ref 39–117)
ALT SERPL W P-5'-P-CCNC: 21 U/L (ref 1–41)
ANION GAP SERPL CALCULATED.3IONS-SCNC: 5 MMOL/L (ref 5–15)
AST SERPL-CCNC: 20 U/L (ref 1–40)
BASOPHILS # BLD AUTO: 0.04 10*3/MM3 (ref 0–0.2)
BASOPHILS NFR BLD AUTO: 0.5 % (ref 0–1.5)
BILIRUB SERPL-MCNC: 0.3 MG/DL (ref 0–1.2)
BUN SERPL-MCNC: 14 MG/DL (ref 8–23)
BUN/CREAT SERPL: 14.9 (ref 7–25)
CALCIUM SPEC-SCNC: 8.7 MG/DL (ref 8.6–10.5)
CHLORIDE SERPL-SCNC: 101 MMOL/L (ref 98–107)
CO2 SERPL-SCNC: 29 MMOL/L (ref 22–29)
CREAT SERPL-MCNC: 0.94 MG/DL (ref 0.76–1.27)
DEPRECATED RDW RBC AUTO: 40.2 FL (ref 37–54)
EOSINOPHIL # BLD AUTO: 0.29 10*3/MM3 (ref 0–0.4)
EOSINOPHIL NFR BLD AUTO: 3.6 % (ref 0.3–6.2)
ERYTHROCYTE [DISTWIDTH] IN BLOOD BY AUTOMATED COUNT: 12.1 % (ref 12.3–15.4)
GFR SERPL CREATININE-BSD FRML MDRD: 76 ML/MIN/1.73
GLOBULIN UR ELPH-MCNC: 2.5 GM/DL
GLUCOSE SERPL-MCNC: 136 MG/DL (ref 65–99)
HCT VFR BLD AUTO: 39.3 % (ref 37.5–51)
HGB BLD-MCNC: 13.1 G/DL (ref 13–17.7)
IMM GRANULOCYTES # BLD AUTO: 0.02 10*3/MM3 (ref 0–0.05)
IMM GRANULOCYTES NFR BLD AUTO: 0.3 % (ref 0–0.5)
LYMPHOCYTES # BLD AUTO: 1.82 10*3/MM3 (ref 0.7–3.1)
LYMPHOCYTES NFR BLD AUTO: 22.8 % (ref 19.6–45.3)
MCH RBC QN AUTO: 30.3 PG (ref 26.6–33)
MCHC RBC AUTO-ENTMCNC: 33.3 G/DL (ref 31.5–35.7)
MCV RBC AUTO: 91 FL (ref 79–97)
MONOCYTES # BLD AUTO: 0.58 10*3/MM3 (ref 0.1–0.9)
MONOCYTES NFR BLD AUTO: 7.3 % (ref 5–12)
NEUTROPHILS NFR BLD AUTO: 5.23 10*3/MM3 (ref 1.7–7)
NEUTROPHILS NFR BLD AUTO: 65.5 % (ref 42.7–76)
NRBC BLD AUTO-RTO: 0 /100 WBC (ref 0–0.2)
PLATELET # BLD AUTO: 233 10*3/MM3 (ref 140–450)
PMV BLD AUTO: 10.1 FL (ref 6–12)
POTASSIUM SERPL-SCNC: 4.3 MMOL/L (ref 3.5–5.2)
PROT SERPL-MCNC: 6.3 G/DL (ref 6–8.5)
RBC # BLD AUTO: 4.32 10*6/MM3 (ref 4.14–5.8)
SODIUM SERPL-SCNC: 135 MMOL/L (ref 136–145)
WBC # BLD AUTO: 7.98 10*3/MM3 (ref 3.4–10.8)

## 2021-03-22 PROCEDURE — 36415 COLL VENOUS BLD VENIPUNCTURE: CPT

## 2021-03-22 PROCEDURE — 80053 COMPREHEN METABOLIC PANEL: CPT

## 2021-03-22 PROCEDURE — 85025 COMPLETE CBC W/AUTO DIFF WBC: CPT

## 2021-04-13 DIAGNOSIS — J84.10 LUNG FIBROSIS (HCC): Primary | ICD-10-CM

## 2021-04-29 ENCOUNTER — OFFICE VISIT (OUTPATIENT)
Dept: PODIATRY | Facility: CLINIC | Age: 86
End: 2021-04-29

## 2021-04-29 VITALS
DIASTOLIC BLOOD PRESSURE: 70 MMHG | WEIGHT: 200 LBS | HEIGHT: 70 IN | SYSTOLIC BLOOD PRESSURE: 142 MMHG | BODY MASS INDEX: 28.63 KG/M2 | HEART RATE: 108 BPM

## 2021-04-29 DIAGNOSIS — M79.672 ACUTE FOOT PAIN, LEFT: Primary | ICD-10-CM

## 2021-04-29 DIAGNOSIS — L60.0 INGROWN NAIL OF GREAT TOE OF LEFT FOOT: ICD-10-CM

## 2021-04-29 PROCEDURE — 99212 OFFICE O/P EST SF 10 MIN: CPT | Performed by: PODIATRIST

## 2021-04-29 PROCEDURE — 11750 EXCISION NAIL&NAIL MATRIX: CPT | Performed by: PODIATRIST

## 2021-04-29 NOTE — PROGRESS NOTES
"04/29/2021  Foot and Ankle Surgery - Established Patient/Follow-up  Provider: Dr. Jeronimo Conroy DPM  Location: [facility]    Subjective:  Willis Lechuga is a 85 y.o. male.     Chief Complaint   Patient presents with   • Left Foot - Ingrown Toenail       HPI: Patient returns with new issue involving his left foot.  He complains of pain that has started to bother his left great toenail.  Issues are noted to the medial border.  He has not had any injury to this area.  He feels that the symptoms are consistent with an ingrown nail.  He denies any other complaints or issues.  He has not had these issues in the past and denies any recent signs of infection.    No Known Allergies    Current Outpatient Medications on File Prior to Visit   Medication Sig Dispense Refill   • alfuzosin (UROXATRAL) 10 MG 24 hr tablet Take 10 mg by mouth Daily.     • aspirin 81 MG EC tablet Take 81 mg by mouth Daily.     • atorvastatin (LIPITOR) 20 MG tablet TAKE 1 TABLET DAILY 90 tablet 3   • Glucosamine-Chondroit-Vit C-Mn (GLUCOSAMINE CHONDR 1500 COMPLX) capsule Take  by mouth.     • levothyroxine (SYNTHROID, LEVOTHROID) 88 MCG tablet TAKE 1 TABLET DAILY 90 tablet 3   • lisinopril (PRINIVIL,ZESTRIL) 5 MG tablet TAKE 1 TABLET DAILY 90 tablet 3   • meclizine (ANTIVERT) 25 MG tablet Take 1 tablet by mouth 3 (Three) Times a Day As Needed for dizziness. 90 tablet 0   • Multiple Vitamins-Minerals (CENTRUM SILVER 50+MEN PO) Take  by mouth.     • Ofev 100 MG capsule        No current facility-administered medications on file prior to visit.       Objective   /70   Pulse 108   Ht 176.5 cm (69.5\")   Wt 90.7 kg (200 lb)   BMI 29.11 kg/m²     General:   Appearance: appears stated age and healthy    Orientation: AAOx3    Affect: appropriate    Gait: antalgic        Left Foot/Ankle:       Vascular   Dorsalis pedis:  2+  Posterior tibial:  2+  Skin Temperature: warm    Edema Grading:  None  CFT:  < 3 seconds  Pedal Hair " Growth:  Absent      Neurologic   Light touch sensation:  Normal  Hot/cold sensation: normal    Protective Sensation using [place]en Monofilament:  10  Achilles reflex:  2+      Musculoskeletal   Ecchymosis:  None  Tenderness:  Mild discomfort today  Arch:  Pes cavus  Hammertoe:  Second toe, third toe, fourth toe and fifth toe (Semirigid deformity)      Muscle Strength   Normal strength    Foot dorsiflexion:  5  Foot plantar flexion:  5  Foot inversion:  5  Foot eversion:  5      Dermatologic   Skin: No open wounds or signs of infection  Nails: Mild pain and erythema involving the medial border of the left hallux.  Moderate incurvation noted to the nail plate      Assessment/Plan   Diagnoses and all orders for this visit:    1. Acute foot pain, left (Primary)    2. Ingrown nail of great toe of left foot      Patient presents with pain involving the medial border of the left great toe.  His symptoms are consistent with an ingrown nail.  I did review the diagnosis and treatment options with him.  He has failed conservative treatment options of soaks and routine nail care.  He has dealt with this issue for several weeks and would like to discuss definitive treatment option.  We did review partial nail avulsion with chemical matrixectomy.  Procedure was performed without complication.  We did review aftercare instructions.  I have asked that he take over-the-counter Tylenol or ibuprofen for symptom management.  Patient is to monitor and call with any additional issues or concerns.  He has opted to see me on an as-needed basis.    Partial Nail Avulsion with Chemical Matrixectomy: Left hallux    Informed consent was obtained prior to the procedure.  Left hallux was cleansed with alcohol and the digit was blocked in a ring block fashion utilizing 5 mL of 1% lidocaine plain.  A digital tourniquet was applied to the digit.  The medial border was lifted from the nail bed and nail margin with a [place].  An English Anvil  was used to split the nail proximal to the eponychium extending into the nail matrix.  The offending nail margin was grasped with a hemostat and removed.  The nail border was explored with a curette to ensure adequate removal.  Matrixectomy was performed utilizing three 30 second applications of 89% phenol on a micro-tip applicator.  The area was then rinsed with alcohol to dilute the phenol. The nail fold and exposed nail bed were flushed with normal saline.  Antibiotic ointment followed by sterile compressive dressings were then applied.  The digital tourniquot was removed.  No excessive bleeding or complications were evident.  The patient tolerated procedure and local anesthetic well.      No orders of the defined types were placed in this encounter.         Note is dictated utilizing voice recognition software. Unfortunately this leads to occasional typographical errors. I apologize in advance if the situation occurs. If questions occur please do not hesitate to call our office.

## 2021-06-28 ENCOUNTER — TRANSCRIBE ORDERS (OUTPATIENT)
Dept: ADMINISTRATIVE | Facility: HOSPITAL | Age: 86
End: 2021-06-28

## 2021-06-28 DIAGNOSIS — Z01.818 OTHER SPECIFIED PRE-OPERATIVE EXAMINATION: Primary | ICD-10-CM

## 2021-07-05 ENCOUNTER — LAB (OUTPATIENT)
Dept: LAB | Facility: HOSPITAL | Age: 86
End: 2021-07-05

## 2021-07-05 DIAGNOSIS — Z01.818 OTHER SPECIFIED PRE-OPERATIVE EXAMINATION: ICD-10-CM

## 2021-07-05 DIAGNOSIS — J84.112 IDIOPATHIC FIBROSING ALVEOLITIS, SUBACUTE FORM (HCC): ICD-10-CM

## 2021-07-05 LAB
ALBUMIN SERPL-MCNC: 3.7 G/DL (ref 3.5–5.2)
ALBUMIN/GLOB SERPL: 1.3 G/DL
ALP SERPL-CCNC: 50 U/L (ref 39–117)
ALT SERPL W P-5'-P-CCNC: 14 U/L (ref 1–41)
ANION GAP SERPL CALCULATED.3IONS-SCNC: 7 MMOL/L (ref 5–15)
AST SERPL-CCNC: 14 U/L (ref 1–40)
BASOPHILS # BLD AUTO: 0.04 10*3/MM3 (ref 0–0.2)
BASOPHILS NFR BLD AUTO: 0.5 % (ref 0–1.5)
BILIRUB SERPL-MCNC: 0.3 MG/DL (ref 0–1.2)
BUN SERPL-MCNC: 14 MG/DL (ref 8–23)
BUN/CREAT SERPL: 15.6 (ref 7–25)
CALCIUM SPEC-SCNC: 8.7 MG/DL (ref 8.6–10.5)
CHLORIDE SERPL-SCNC: 103 MMOL/L (ref 98–107)
CO2 SERPL-SCNC: 29 MMOL/L (ref 22–29)
CREAT SERPL-MCNC: 0.9 MG/DL (ref 0.76–1.27)
DEPRECATED RDW RBC AUTO: 44.3 FL (ref 37–54)
EOSINOPHIL # BLD AUTO: 0.25 10*3/MM3 (ref 0–0.4)
EOSINOPHIL NFR BLD AUTO: 3 % (ref 0.3–6.2)
ERYTHROCYTE [DISTWIDTH] IN BLOOD BY AUTOMATED COUNT: 13.1 % (ref 12.3–15.4)
GFR SERPL CREATININE-BSD FRML MDRD: 80 ML/MIN/1.73
GLOBULIN UR ELPH-MCNC: 2.9 GM/DL
GLUCOSE SERPL-MCNC: 88 MG/DL (ref 65–99)
HCT VFR BLD AUTO: 40.9 % (ref 37.5–51)
HGB BLD-MCNC: 13.3 G/DL (ref 13–17.7)
IMM GRANULOCYTES # BLD AUTO: 0.01 10*3/MM3 (ref 0–0.05)
IMM GRANULOCYTES NFR BLD AUTO: 0.1 % (ref 0–0.5)
LYMPHOCYTES # BLD AUTO: 1.9 10*3/MM3 (ref 0.7–3.1)
LYMPHOCYTES NFR BLD AUTO: 23 % (ref 19.6–45.3)
MCH RBC QN AUTO: 30.4 PG (ref 26.6–33)
MCHC RBC AUTO-ENTMCNC: 32.5 G/DL (ref 31.5–35.7)
MCV RBC AUTO: 93.4 FL (ref 79–97)
MONOCYTES # BLD AUTO: 0.84 10*3/MM3 (ref 0.1–0.9)
MONOCYTES NFR BLD AUTO: 10.2 % (ref 5–12)
NEUTROPHILS NFR BLD AUTO: 5.21 10*3/MM3 (ref 1.7–7)
NEUTROPHILS NFR BLD AUTO: 63.2 % (ref 42.7–76)
NRBC BLD AUTO-RTO: 0 /100 WBC (ref 0–0.2)
PLATELET # BLD AUTO: 261 10*3/MM3 (ref 140–450)
PMV BLD AUTO: 9.8 FL (ref 6–12)
POTASSIUM SERPL-SCNC: 4.1 MMOL/L (ref 3.5–5.2)
PROT SERPL-MCNC: 6.6 G/DL (ref 6–8.5)
RBC # BLD AUTO: 4.38 10*6/MM3 (ref 4.14–5.8)
SARS-COV-2 ORF1AB RESP QL NAA+PROBE: NOT DETECTED
SODIUM SERPL-SCNC: 139 MMOL/L (ref 136–145)
WBC # BLD AUTO: 8.25 10*3/MM3 (ref 3.4–10.8)

## 2021-07-05 PROCEDURE — U0004 COV-19 TEST NON-CDC HGH THRU: HCPCS

## 2021-07-05 PROCEDURE — U0005 INFEC AGEN DETEC AMPLI PROBE: HCPCS

## 2021-07-05 PROCEDURE — 85025 COMPLETE CBC W/AUTO DIFF WBC: CPT

## 2021-07-05 PROCEDURE — 36415 COLL VENOUS BLD VENIPUNCTURE: CPT

## 2021-07-05 PROCEDURE — C9803 HOPD COVID-19 SPEC COLLECT: HCPCS

## 2021-07-05 PROCEDURE — 80053 COMPREHEN METABOLIC PANEL: CPT

## 2021-07-07 ENCOUNTER — HOSPITAL ENCOUNTER (OUTPATIENT)
Dept: RESPIRATORY THERAPY | Facility: HOSPITAL | Age: 86
Discharge: HOME | End: 2021-07-07
Admitting: INTERNAL MEDICINE

## 2021-07-07 VITALS
HEART RATE: 109 BPM | OXYGEN SATURATION: 94 % | WEIGHT: 188 LBS | HEIGHT: 69 IN | BODY MASS INDEX: 27.85 KG/M2 | RESPIRATION RATE: 18 BRPM

## 2021-07-07 DIAGNOSIS — J84.10 LUNG FIBROSIS (HCC): ICD-10-CM

## 2021-07-07 PROCEDURE — A9270 NON-COVERED ITEM OR SERVICE: HCPCS | Performed by: INTERNAL MEDICINE

## 2021-07-07 PROCEDURE — 94060 EVALUATION OF WHEEZING: CPT

## 2021-07-07 PROCEDURE — 94726 PLETHYSMOGRAPHY LUNG VOLUMES: CPT

## 2021-07-07 PROCEDURE — 63710000001 ALBUTEROL SULFATE HFA 108 (90 BASE) MCG/ACT AEROSOL SOLUTION 6.7 G INHALER: Performed by: INTERNAL MEDICINE

## 2021-07-07 PROCEDURE — 94729 DIFFUSING CAPACITY: CPT

## 2021-07-07 RX ORDER — ALBUTEROL SULFATE 90 UG/1
2 AEROSOL, METERED RESPIRATORY (INHALATION) ONCE
Status: COMPLETED | OUTPATIENT
Start: 2021-07-07 | End: 2021-07-07

## 2021-07-07 RX ADMIN — ALBUTEROL SULFATE 2 PUFF: 108 AEROSOL, METERED RESPIRATORY (INHALATION) at 08:53

## 2021-07-21 ENCOUNTER — TRANSCRIBE ORDERS (OUTPATIENT)
Dept: ADMINISTRATIVE | Facility: HOSPITAL | Age: 86
End: 2021-07-21

## 2021-07-21 DIAGNOSIS — I27.20 PHT (PULMONARY HYPERTENSION) (HCC): ICD-10-CM

## 2021-07-21 DIAGNOSIS — R06.00 DYSPNEA, UNSPECIFIED TYPE: ICD-10-CM

## 2021-07-21 DIAGNOSIS — J84.112 IPF (IDIOPATHIC PULMONARY FIBROSIS) (HCC): Primary | ICD-10-CM

## 2021-07-29 ENCOUNTER — TRANSCRIBE ORDERS (OUTPATIENT)
Dept: ADMINISTRATIVE | Facility: HOSPITAL | Age: 86
End: 2021-07-29

## 2021-07-29 DIAGNOSIS — Z01.818 OTHER SPECIFIED PRE-OPERATIVE EXAMINATION: Primary | ICD-10-CM

## 2021-07-29 NOTE — PROCEDURES
Pulmonary Function Test Interpretation  Willis Lechuga  [id number]    07/29/21  19:36 EDT    Spirometry  Spirometry demonstrates no airway obstruction.    Post Bronchodilator  Following the inhalation of a bronchodilator, there is no significant change in airway obstruction. This does not necessrily mean that chronic bronchodilator therapy may not be useful.    MVV  The reduction in maximum voluntary ventilation reflects the reduced FEV1.    Lung Volume  Lung volumes are consistent with severe restrictive lung disease.    Diffusion  The diffusing capacity for carbon monoxide, a reflection of alveolar-capillary gas transport, is substantially reduced.    Study Comparison  N/A    Further Work-Up  N/A        Study date: 7/7/20201

## 2021-07-30 ENCOUNTER — HOSPITAL ENCOUNTER (OUTPATIENT)
Dept: CARDIOLOGY | Facility: HOSPITAL | Age: 86
Discharge: HOME | End: 2021-07-30

## 2021-07-30 ENCOUNTER — HOSPITAL ENCOUNTER (OUTPATIENT)
Dept: CT IMAGING | Facility: HOSPITAL | Age: 86
Discharge: HOME | End: 2021-07-30

## 2021-07-30 VITALS
HEIGHT: 69 IN | SYSTOLIC BLOOD PRESSURE: 119 MMHG | DIASTOLIC BLOOD PRESSURE: 48 MMHG | WEIGHT: 188 LBS | BODY MASS INDEX: 27.85 KG/M2

## 2021-07-30 DIAGNOSIS — J84.112 IPF (IDIOPATHIC PULMONARY FIBROSIS) (HCC): ICD-10-CM

## 2021-07-30 DIAGNOSIS — I27.20 PHT (PULMONARY HYPERTENSION) (HCC): ICD-10-CM

## 2021-07-30 PROCEDURE — 93306 TTE W/DOPPLER COMPLETE: CPT

## 2021-07-30 PROCEDURE — 71250 CT THORAX DX C-: CPT

## 2021-07-30 PROCEDURE — 93306 TTE W/DOPPLER COMPLETE: CPT | Performed by: INTERNAL MEDICINE

## 2021-07-31 LAB
BH CV ECHO MEAS - ACS: 2.1 CM
BH CV ECHO MEAS - AI DEC SLOPE: 135.2 CM/SEC^2
BH CV ECHO MEAS - AI DEC TIME: 1.8 SEC
BH CV ECHO MEAS - AI MAX PG: 23.2 MMHG
BH CV ECHO MEAS - AI MAX VEL: 240.9 CM/SEC
BH CV ECHO MEAS - AI P1/2T: 521.9 MSEC
BH CV ECHO MEAS - AO MAX PG (FULL): 2.1 MMHG
BH CV ECHO MEAS - AO MAX PG: 3.9 MMHG
BH CV ECHO MEAS - AO MEAN PG (FULL): 1.5 MMHG
BH CV ECHO MEAS - AO MEAN PG: 2.4 MMHG
BH CV ECHO MEAS - AO ROOT AREA (BSA CORRECTED): 2.1
BH CV ECHO MEAS - AO ROOT AREA: 11.2 CM^2
BH CV ECHO MEAS - AO ROOT DIAM: 3.8 CM
BH CV ECHO MEAS - AO V2 MAX: 98.7 CM/SEC
BH CV ECHO MEAS - AO V2 MEAN: 74.2 CM/SEC
BH CV ECHO MEAS - AO V2 VTI: 20.5 CM
BH CV ECHO MEAS - AVA(I,A): 2 CM^2
BH CV ECHO MEAS - AVA(I,D): 2 CM^2
BH CV ECHO MEAS - AVA(V,A): 2.5 CM^2
BH CV ECHO MEAS - AVA(V,D): 2.5 CM^2
BH CV ECHO MEAS - BSA(HAYCOCK): 1.9 M^2
BH CV ECHO MEAS - BSA: 1.8 M^2
BH CV ECHO MEAS - BZI_BMI: 37.4 KILOGRAMS/M^2
BH CV ECHO MEAS - BZI_METRIC_HEIGHT: 149.9 CM
BH CV ECHO MEAS - BZI_METRIC_WEIGHT: 83.9 KG
BH CV ECHO MEAS - EDV(CUBED): 56 ML
BH CV ECHO MEAS - EDV(MOD-SP4): 81.1 ML
BH CV ECHO MEAS - EDV(TEICH): 63 ML
BH CV ECHO MEAS - EF(CUBED): 75.6 %
BH CV ECHO MEAS - EF(MOD-BP): 51 %
BH CV ECHO MEAS - EF(MOD-SP4): 51.2 %
BH CV ECHO MEAS - EF(TEICH): 68.3 %
BH CV ECHO MEAS - ESV(CUBED): 13.7 ML
BH CV ECHO MEAS - ESV(MOD-SP4): 39.6 ML
BH CV ECHO MEAS - ESV(TEICH): 20 ML
BH CV ECHO MEAS - FS: 37.5 %
BH CV ECHO MEAS - IVS/LVPW: 0.82
BH CV ECHO MEAS - IVSD: 0.88 CM
BH CV ECHO MEAS - LA DIMENSION(2D): 3.6 CM
BH CV ECHO MEAS - LV DIASTOLIC VOL/BSA (35-75): 45.5 ML/M^2
BH CV ECHO MEAS - LV MASS(C)D: 115.6 GRAMS
BH CV ECHO MEAS - LV MASS(C)DI: 64.8 GRAMS/M^2
BH CV ECHO MEAS - LV MAX PG: 1.8 MMHG
BH CV ECHO MEAS - LV MEAN PG: 0.91 MMHG
BH CV ECHO MEAS - LV SYSTOLIC VOL/BSA (12-30): 22.2 ML/M^2
BH CV ECHO MEAS - LV V1 MAX: 66.3 CM/SEC
BH CV ECHO MEAS - LV V1 MEAN: 44.7 CM/SEC
BH CV ECHO MEAS - LV V1 VTI: 11.1 CM
BH CV ECHO MEAS - LVIDD: 3.8 CM
BH CV ECHO MEAS - LVIDS: 2.4 CM
BH CV ECHO MEAS - LVOT AREA: 3.8 CM^2
BH CV ECHO MEAS - LVOT DIAM: 2.2 CM
BH CV ECHO MEAS - LVPWD: 1.1 CM
BH CV ECHO MEAS - MV A MAX VEL: 120.8 CM/SEC
BH CV ECHO MEAS - MV DEC SLOPE: 927 CM/SEC^2
BH CV ECHO MEAS - MV DEC TIME: 0.09 SEC
BH CV ECHO MEAS - MV E MAX VEL: 85 CM/SEC
BH CV ECHO MEAS - MV E/A: 0.7
BH CV ECHO MEAS - MV MAX PG: 7.3 MMHG
BH CV ECHO MEAS - MV MEAN PG: 3 MMHG
BH CV ECHO MEAS - MV V2 MAX: 134.7 CM/SEC
BH CV ECHO MEAS - MV V2 MEAN: 81.5 CM/SEC
BH CV ECHO MEAS - MV V2 VTI: 19.9 CM
BH CV ECHO MEAS - MVA(VTI): 2.1 CM^2
BH CV ECHO MEAS - PA MAX PG (FULL): 2.5 MMHG
BH CV ECHO MEAS - PA MAX PG: 3.5 MMHG
BH CV ECHO MEAS - PA V2 MAX: 93.3 CM/SEC
BH CV ECHO MEAS - PULM A REVS DUR: 0.07 SEC
BH CV ECHO MEAS - PULM A REVS VEL: 30.8 CM/SEC
BH CV ECHO MEAS - PULM DIAS VEL: 43 CM/SEC
BH CV ECHO MEAS - PULM S/D: 1.2
BH CV ECHO MEAS - PULM SYS VEL: 51.7 CM/SEC
BH CV ECHO MEAS - PVA(V,A): 1.3 CM^2
BH CV ECHO MEAS - PVA(V,D): 1.3 CM^2
BH CV ECHO MEAS - QP/QS: 0.6
BH CV ECHO MEAS - RV MAX PG: 0.95 MMHG
BH CV ECHO MEAS - RV MEAN PG: 0.48 MMHG
BH CV ECHO MEAS - RV V1 MAX: 48.6 CM/SEC
BH CV ECHO MEAS - RV V1 MEAN: 32.1 CM/SEC
BH CV ECHO MEAS - RV V1 VTI: 9.8 CM
BH CV ECHO MEAS - RVDD: 2.9 CM
BH CV ECHO MEAS - RVOT AREA: 2.6 CM^2
BH CV ECHO MEAS - RVOT DIAM: 1.8 CM
BH CV ECHO MEAS - SI(AO): 129.4 ML/M^2
BH CV ECHO MEAS - SI(CUBED): 23.7 ML/M^2
BH CV ECHO MEAS - SI(LVOT): 23.5 ML/M^2
BH CV ECHO MEAS - SI(MOD-SP4): 23.3 ML/M^2
BH CV ECHO MEAS - SI(TEICH): 24.1 ML/M^2
BH CV ECHO MEAS - SV(AO): 230.8 ML
BH CV ECHO MEAS - SV(CUBED): 42.3 ML
BH CV ECHO MEAS - SV(LVOT): 42 ML
BH CV ECHO MEAS - SV(MOD-SP4): 41.5 ML
BH CV ECHO MEAS - SV(RVOT): 25.2 ML
BH CV ECHO MEAS - SV(TEICH): 43 ML
MAXIMAL PREDICTED HEART RATE: 135 BPM
STRESS TARGET HR: 115 BPM

## 2021-08-02 ENCOUNTER — HOSPITAL ENCOUNTER (OUTPATIENT)
Dept: RESPIRATORY THERAPY | Facility: HOSPITAL | Age: 86
Discharge: HOME | End: 2021-08-02
Admitting: INTERNAL MEDICINE

## 2021-08-02 DIAGNOSIS — R06.00 DYSPNEA, UNSPECIFIED TYPE: ICD-10-CM

## 2021-08-02 PROCEDURE — 94618 PULMONARY STRESS TESTING: CPT

## 2021-08-02 NOTE — NURSING NOTE
Exercise Oximetry    Patient Name:Willis Lechuga   MRN: [id number]   Date: 08/02/21             ROOM AIR BASELINE   SpO2% 92   Heart Rate 104   Blood Pressure      EXERCISE ON ROOM AIR SpO2% EXERCISE ON O2 @ 2 LPM SpO2%   1 MINUTE 92 1 MINUTE 97   2 MINUTES 90 2 MINUTES 95   3 MINUTES 89 3 MINUTES 94   4 MINUTES 88 4 MINUTES 94   5 MINUTES 86 5 MINUTES 92   6 MINUTES 86 6 MINUTES 93              Distance Walked   Distance Walked   Dyspnea (Vi Scale)   Dyspnea (Vi Scale)   Fatigue (Vi Scale)   Fatigue (Vi Scale)   SpO2% Post Exercise  93 SpO2% Post Exercise   HR Post Exercise  105 HR Post Exercise   Time to Recovery  1-2 MINUTES Time to Recovery     Comments: PATIENT WALKED ON ROOM AIR, O2 SATS DECREASED TO 86% ON ROOM AIR, O2 AT 2 LPM PLACED ON PATIENT, O2 SATS REMAINED 92% AND ABOVE WHILE WALKING ON O2 AT 2 LPM

## 2021-08-03 ENCOUNTER — APPOINTMENT (OUTPATIENT)
Dept: RESPIRATORY THERAPY | Facility: HOSPITAL | Age: 86
End: 2021-08-03

## 2021-08-31 RX ORDER — LEVOTHYROXINE SODIUM 88 MCG
TABLET ORAL
Qty: 90 TABLET | Refills: 2 | Status: SHIPPED | OUTPATIENT
Start: 2021-08-31 | End: 2022-10-18

## 2021-09-15 DIAGNOSIS — R42 VERTIGO: ICD-10-CM

## 2021-09-15 RX ORDER — DIPHENOXYLATE HYDROCHLORIDE AND ATROPINE SULFATE 2.5; .025 MG/1; MG/1
TABLET ORAL
Qty: 40 TABLET | Refills: 2 | Status: SHIPPED | OUTPATIENT
Start: 2021-09-15

## 2021-11-05 RX ORDER — LISINOPRIL 5 MG/1
TABLET ORAL
Qty: 90 TABLET | Refills: 3 | Status: SHIPPED | OUTPATIENT
Start: 2021-11-05 | End: 2022-12-29

## 2021-11-15 ENCOUNTER — TELEPHONE (OUTPATIENT)
Dept: FAMILY MEDICINE CLINIC | Facility: CLINIC | Age: 86
End: 2021-11-15

## 2021-11-15 NOTE — TELEPHONE ENCOUNTER
Caller: Shawn Lechuga    Relationship: Self    Best call back number: [phone]     What is the best time to reach you: ANYTIME     Who are you requesting to speak with (clinical staff, provider,  specific staff member): CLINICAL     Do you know the name of the person who called: SHAWN    What was the call regarding:      SHAWN SAYS HE HAS A SORE THROAT, HE IS CURRENTLY TAKING OFEV FOR IDIOPATHIC PULMONARY FIBROSIS , HE IS WANTING TO KNOW IF THERE IS A MEDICATION THAT HE CAN TAKE WITH IT FOR HIS SORE THROAT . DR MAR(PULMONOLOGIST) IS OUT OF OFFICE FOR WEEK.     Do you require a callback: YES

## 2021-11-16 ENCOUNTER — APPOINTMENT (OUTPATIENT)
Dept: GENERAL RADIOLOGY | Facility: HOSPITAL | Age: 86
End: 2021-11-16

## 2021-11-16 ENCOUNTER — HOSPITAL ENCOUNTER (INPATIENT)
Facility: HOSPITAL | Age: 86
LOS: 2 days | Discharge: HOME | End: 2021-11-19
Attending: EMERGENCY MEDICINE | Admitting: HOSPITALIST

## 2021-11-16 DIAGNOSIS — A41.9 SEPSIS DUE TO PNEUMONIA (HCC): ICD-10-CM

## 2021-11-16 DIAGNOSIS — J18.9 SEPSIS DUE TO PNEUMONIA (HCC): ICD-10-CM

## 2021-11-16 DIAGNOSIS — J18.9 PNEUMONIA DUE TO INFECTIOUS ORGANISM, UNSPECIFIED LATERALITY, UNSPECIFIED PART OF LUNG: Primary | ICD-10-CM

## 2021-11-16 DIAGNOSIS — Z20.822 LAB TEST NEGATIVE FOR COVID-19 VIRUS: ICD-10-CM

## 2021-11-16 DIAGNOSIS — J96.01 ACUTE RESPIRATORY FAILURE WITH HYPOXEMIA (HCC): ICD-10-CM

## 2021-11-16 DIAGNOSIS — J84.112 IDIOPATHIC PULMONARY FIBROSIS (HCC): ICD-10-CM

## 2021-11-16 LAB
ALBUMIN SERPL-MCNC: 3.7 G/DL (ref 3.5–5.2)
ALBUMIN/GLOB SERPL: 1.2 G/DL
ALP SERPL-CCNC: 60 U/L (ref 39–117)
ALT SERPL W P-5'-P-CCNC: 23 U/L (ref 1–41)
ANION GAP SERPL CALCULATED.3IONS-SCNC: 13 MMOL/L (ref 5–15)
AST SERPL-CCNC: 22 U/L (ref 1–40)
BASOPHILS # BLD AUTO: 0 10*3/MM3 (ref 0–0.2)
BASOPHILS NFR BLD AUTO: 0.3 % (ref 0–1.5)
BILIRUB SERPL-MCNC: 0.5 MG/DL (ref 0–1.2)
BUN SERPL-MCNC: 11 MG/DL (ref 8–23)
BUN/CREAT SERPL: 13.4 (ref 7–25)
CALCIUM SPEC-SCNC: 8.4 MG/DL (ref 8.6–10.5)
CHLORIDE SERPL-SCNC: 96 MMOL/L (ref 98–107)
CO2 SERPL-SCNC: 27 MMOL/L (ref 22–29)
CREAT SERPL-MCNC: 0.82 MG/DL (ref 0.76–1.27)
D-LACTATE SERPL-SCNC: 1.9 MMOL/L (ref 0.5–2)
DEPRECATED RDW RBC AUTO: 45.9 FL (ref 37–54)
EOSINOPHIL # BLD AUTO: 0.1 10*3/MM3 (ref 0–0.4)
EOSINOPHIL NFR BLD AUTO: 0.9 % (ref 0.3–6.2)
ERYTHROCYTE [DISTWIDTH] IN BLOOD BY AUTOMATED COUNT: 14.1 % (ref 12.3–15.4)
GFR SERPL CREATININE-BSD FRML MDRD: 89 ML/MIN/1.73
GLOBULIN UR ELPH-MCNC: 3.1 GM/DL
GLUCOSE SERPL-MCNC: 113 MG/DL (ref 65–99)
HCT VFR BLD AUTO: 36.6 % (ref 37.5–51)
HGB BLD-MCNC: 12.1 G/DL (ref 13–17.7)
HOLD SPECIMEN: NORMAL
LYMPHOCYTES # BLD AUTO: 2 10*3/MM3 (ref 0.7–3.1)
LYMPHOCYTES NFR BLD AUTO: 15.1 % (ref 19.6–45.3)
MCH RBC QN AUTO: 30.6 PG (ref 26.6–33)
MCHC RBC AUTO-ENTMCNC: 33 G/DL (ref 31.5–35.7)
MCV RBC AUTO: 92.9 FL (ref 79–97)
MONOCYTES # BLD AUTO: 1.5 10*3/MM3 (ref 0.1–0.9)
MONOCYTES NFR BLD AUTO: 11.2 % (ref 5–12)
NEUTROPHILS NFR BLD AUTO: 72.5 % (ref 42.7–76)
NEUTROPHILS NFR BLD AUTO: 9.6 10*3/MM3 (ref 1.7–7)
NRBC BLD AUTO-RTO: 0 /100 WBC (ref 0–0.2)
NT-PROBNP SERPL-MCNC: 78.6 PG/ML (ref 0–1800)
PLATELET # BLD AUTO: 238 10*3/MM3 (ref 140–450)
PMV BLD AUTO: 8.3 FL (ref 6–12)
POTASSIUM SERPL-SCNC: 3.4 MMOL/L (ref 3.5–5.2)
PROCALCITONIN SERPL-MCNC: 0.06 NG/ML (ref 0–0.25)
PROT SERPL-MCNC: 6.8 G/DL (ref 6–8.5)
QT INTERVAL: 283 MS
RBC # BLD AUTO: 3.94 10*6/MM3 (ref 4.14–5.8)
SODIUM SERPL-SCNC: 136 MMOL/L (ref 136–145)
TROPONIN T SERPL-MCNC: <0.01 NG/ML (ref 0–0.03)
WBC # BLD AUTO: 13.3 10*3/MM3 (ref 3.4–10.8)

## 2021-11-16 PROCEDURE — 93005 ELECTROCARDIOGRAM TRACING: CPT

## 2021-11-16 PROCEDURE — 83880 ASSAY OF NATRIURETIC PEPTIDE: CPT | Performed by: EMERGENCY MEDICINE

## 2021-11-16 PROCEDURE — 80053 COMPREHEN METABOLIC PANEL: CPT | Performed by: EMERGENCY MEDICINE

## 2021-11-16 PROCEDURE — 84145 PROCALCITONIN (PCT): CPT | Performed by: EMERGENCY MEDICINE

## 2021-11-16 PROCEDURE — 71045 X-RAY EXAM CHEST 1 VIEW: CPT

## 2021-11-16 PROCEDURE — 0202U NFCT DS 22 TRGT SARS-COV-2: CPT | Performed by: EMERGENCY MEDICINE

## 2021-11-16 PROCEDURE — 84484 ASSAY OF TROPONIN QUANT: CPT | Performed by: EMERGENCY MEDICINE

## 2021-11-16 PROCEDURE — 99285 EMERGENCY DEPT VISIT HI MDM: CPT

## 2021-11-16 PROCEDURE — 87040 BLOOD CULTURE FOR BACTERIA: CPT | Performed by: EMERGENCY MEDICINE

## 2021-11-16 PROCEDURE — 85025 COMPLETE CBC W/AUTO DIFF WBC: CPT | Performed by: EMERGENCY MEDICINE

## 2021-11-16 PROCEDURE — 93005 ELECTROCARDIOGRAM TRACING: CPT | Performed by: EMERGENCY MEDICINE

## 2021-11-16 PROCEDURE — 83605 ASSAY OF LACTIC ACID: CPT

## 2021-11-16 RX ORDER — SODIUM CHLORIDE 0.9 % (FLUSH) 0.9 %
10 SYRINGE (ML) INJECTION AS NEEDED
Status: DISCONTINUED | OUTPATIENT
Start: 2021-11-16 | End: 2021-11-19 | Stop reason: HOSPADM

## 2021-11-16 RX ORDER — BROMPHENIRAMINE MALEATE, PSEUDOEPHEDRINE HYDROCHLORIDE, AND DEXTROMETHORPHAN HYDROBROMIDE 2; 30; 10 MG/5ML; MG/5ML; MG/5ML
5-10 SYRUP ORAL 4 TIMES DAILY PRN
Qty: 240 ML | Refills: 0 | Status: SHIPPED | OUTPATIENT
Start: 2021-11-16 | End: 2021-11-26

## 2021-11-16 RX ORDER — AZITHROMYCIN 500 MG/1
500 TABLET, FILM COATED ORAL DAILY
Qty: 5 TABLET | Refills: 1 | Status: SHIPPED | OUTPATIENT
Start: 2021-11-16 | End: 2021-11-19 | Stop reason: HOSPADM

## 2021-11-16 RX ORDER — ACETAMINOPHEN 500 MG
1000 TABLET ORAL ONCE
Status: COMPLETED | OUTPATIENT
Start: 2021-11-16 | End: 2021-11-16

## 2021-11-16 RX ADMIN — SODIUM CHLORIDE 1000 ML: 9 INJECTION, SOLUTION INTRAVENOUS at 22:59

## 2021-11-16 RX ADMIN — ACETAMINOPHEN 1000 MG: 500 TABLET, FILM COATED ORAL at 22:59

## 2021-11-17 PROBLEM — E87.6 HYPOKALEMIA: Status: ACTIVE | Noted: 2021-11-17

## 2021-11-17 PROBLEM — J18.9 COMMUNITY ACQUIRED PNEUMONIA: Status: ACTIVE | Noted: 2021-11-17

## 2021-11-17 PROBLEM — A41.9 SEPSIS DUE TO PNEUMONIA (HCC): Status: ACTIVE | Noted: 2021-11-17

## 2021-11-17 LAB
ANION GAP SERPL CALCULATED.3IONS-SCNC: 9 MMOL/L (ref 5–15)
B PARAPERT DNA SPEC QL NAA+PROBE: NOT DETECTED
B PERT DNA SPEC QL NAA+PROBE: NOT DETECTED
BASOPHILS # BLD AUTO: 0 10*3/MM3 (ref 0–0.2)
BASOPHILS NFR BLD AUTO: 0.4 % (ref 0–1.5)
BUN SERPL-MCNC: 10 MG/DL (ref 8–23)
BUN/CREAT SERPL: 13.2 (ref 7–25)
C PNEUM DNA NPH QL NAA+NON-PROBE: NOT DETECTED
CALCIUM SPEC-SCNC: 7.8 MG/DL (ref 8.6–10.5)
CHLORIDE SERPL-SCNC: 100 MMOL/L (ref 98–107)
CO2 SERPL-SCNC: 26 MMOL/L (ref 22–29)
CREAT SERPL-MCNC: 0.76 MG/DL (ref 0.76–1.27)
DEPRECATED RDW RBC AUTO: 45.1 FL (ref 37–54)
EOSINOPHIL # BLD AUTO: 0.1 10*3/MM3 (ref 0–0.4)
EOSINOPHIL NFR BLD AUTO: 1.3 % (ref 0.3–6.2)
ERYTHROCYTE [DISTWIDTH] IN BLOOD BY AUTOMATED COUNT: 13.9 % (ref 12.3–15.4)
FLUAV SUBTYP SPEC NAA+PROBE: NOT DETECTED
FLUBV RNA ISLT QL NAA+PROBE: NOT DETECTED
GFR SERPL CREATININE-BSD FRML MDRD: 97 ML/MIN/1.73
GLUCOSE SERPL-MCNC: 96 MG/DL (ref 65–99)
HADV DNA SPEC NAA+PROBE: NOT DETECTED
HCOV 229E RNA SPEC QL NAA+PROBE: NOT DETECTED
HCOV HKU1 RNA SPEC QL NAA+PROBE: NOT DETECTED
HCOV NL63 RNA SPEC QL NAA+PROBE: NOT DETECTED
HCOV OC43 RNA SPEC QL NAA+PROBE: NOT DETECTED
HCT VFR BLD AUTO: 32.1 % (ref 37.5–51)
HGB BLD-MCNC: 10.8 G/DL (ref 13–17.7)
HMPV RNA NPH QL NAA+NON-PROBE: NOT DETECTED
HOLD SPECIMEN: NORMAL
HPIV1 RNA SPEC QL NAA+PROBE: NOT DETECTED
HPIV2 RNA SPEC QL NAA+PROBE: NOT DETECTED
HPIV3 RNA NPH QL NAA+PROBE: NOT DETECTED
HPIV4 P GENE NPH QL NAA+PROBE: NOT DETECTED
L PNEUMO1 AG UR QL IA: NEGATIVE
LYMPHOCYTES # BLD AUTO: 1.1 10*3/MM3 (ref 0.7–3.1)
LYMPHOCYTES NFR BLD AUTO: 11.5 % (ref 19.6–45.3)
M PNEUMO IGG SER IA-ACNC: NOT DETECTED
MCH RBC QN AUTO: 30.8 PG (ref 26.6–33)
MCHC RBC AUTO-ENTMCNC: 33.6 G/DL (ref 31.5–35.7)
MCV RBC AUTO: 91.8 FL (ref 79–97)
MONOCYTES # BLD AUTO: 1.1 10*3/MM3 (ref 0.1–0.9)
MONOCYTES NFR BLD AUTO: 11.2 % (ref 5–12)
NEUTROPHILS NFR BLD AUTO: 7.3 10*3/MM3 (ref 1.7–7)
NEUTROPHILS NFR BLD AUTO: 75.6 % (ref 42.7–76)
NRBC BLD AUTO-RTO: 0 /100 WBC (ref 0–0.2)
PLATELET # BLD AUTO: 198 10*3/MM3 (ref 140–450)
PMV BLD AUTO: 7.4 FL (ref 6–12)
POTASSIUM SERPL-SCNC: 3.7 MMOL/L (ref 3.5–5.2)
RBC # BLD AUTO: 3.49 10*6/MM3 (ref 4.14–5.8)
RHINOVIRUS RNA SPEC NAA+PROBE: NOT DETECTED
RSV RNA NPH QL NAA+NON-PROBE: NOT DETECTED
S PNEUM AG SPEC QL LA: NEGATIVE
SARS-COV-2 RNA PNL SPEC NAA+PROBE: NOT DETECTED
SODIUM SERPL-SCNC: 135 MMOL/L (ref 136–145)
WBC # BLD AUTO: 9.7 10*3/MM3 (ref 3.4–10.8)
WHOLE BLOOD HOLD SPECIMEN: NORMAL
WHOLE BLOOD HOLD SPECIMEN: NORMAL

## 2021-11-17 PROCEDURE — 25010000002 AZITHROMYCIN PER 500 MG: Performed by: INTERNAL MEDICINE

## 2021-11-17 PROCEDURE — 25010000002 ENOXAPARIN PER 10 MG: Performed by: INTERNAL MEDICINE

## 2021-11-17 PROCEDURE — 99223 1ST HOSP IP/OBS HIGH 75: CPT | Performed by: INTERNAL MEDICINE

## 2021-11-17 PROCEDURE — 80048 BASIC METABOLIC PNL TOTAL CA: CPT | Performed by: INTERNAL MEDICINE

## 2021-11-17 PROCEDURE — 99233 SBSQ HOSP IP/OBS HIGH 50: CPT | Performed by: HOSPITALIST

## 2021-11-17 PROCEDURE — 85025 COMPLETE CBC W/AUTO DIFF WBC: CPT | Performed by: INTERNAL MEDICINE

## 2021-11-17 PROCEDURE — 87899 AGENT NOS ASSAY W/OPTIC: CPT | Performed by: INTERNAL MEDICINE

## 2021-11-17 PROCEDURE — 36415 COLL VENOUS BLD VENIPUNCTURE: CPT | Performed by: INTERNAL MEDICINE

## 2021-11-17 PROCEDURE — 25010000002 CEFTRIAXONE IN SWFI 1 GRAM/10ML IV PUSH SYRINGE (SIMPLE)

## 2021-11-17 PROCEDURE — 25010000002 MEROPENEM PER 100 MG

## 2021-11-17 PROCEDURE — 87040 BLOOD CULTURE FOR BACTERIA: CPT | Performed by: EMERGENCY MEDICINE

## 2021-11-17 RX ORDER — MEROPENEM 500 MG/1
INJECTION, POWDER, FOR SOLUTION INTRAVENOUS
Status: DISPENSED
Start: 2021-11-17 | End: 2021-11-17

## 2021-11-17 RX ORDER — LEVOTHYROXINE SODIUM 88 UG/1
88 TABLET ORAL DAILY
Status: DISCONTINUED | OUTPATIENT
Start: 2021-11-17 | End: 2021-11-19 | Stop reason: HOSPADM

## 2021-11-17 RX ORDER — ATORVASTATIN CALCIUM 20 MG/1
20 TABLET, FILM COATED ORAL DAILY
Status: CANCELLED | OUTPATIENT
Start: 2021-11-17

## 2021-11-17 RX ORDER — DIPHENOXYLATE HYDROCHLORIDE AND ATROPINE SULFATE 2.5; .025 MG/1; MG/1
1 TABLET ORAL 4 TIMES DAILY PRN
Status: DISCONTINUED | OUTPATIENT
Start: 2021-11-17 | End: 2021-11-19 | Stop reason: HOSPADM

## 2021-11-17 RX ORDER — POTASSIUM CHLORIDE 20 MEQ/1
20 TABLET, EXTENDED RELEASE ORAL ONCE
Status: COMPLETED | OUTPATIENT
Start: 2021-11-17 | End: 2021-11-17

## 2021-11-17 RX ORDER — SODIUM CHLORIDE 9 MG/ML
INJECTION, SOLUTION INTRAVENOUS
Status: COMPLETED
Start: 2021-11-17 | End: 2021-11-17

## 2021-11-17 RX ORDER — ACETAMINOPHEN 325 MG/1
650 TABLET ORAL EVERY 4 HOURS PRN
Status: DISCONTINUED | OUTPATIENT
Start: 2021-11-17 | End: 2021-11-19 | Stop reason: HOSPADM

## 2021-11-17 RX ORDER — ONDANSETRON 2 MG/ML
4 INJECTION INTRAMUSCULAR; INTRAVENOUS EVERY 6 HOURS PRN
Status: DISCONTINUED | OUTPATIENT
Start: 2021-11-17 | End: 2021-11-19 | Stop reason: HOSPADM

## 2021-11-17 RX ORDER — ASPIRIN 81 MG/1
81 TABLET ORAL DAILY
Status: CANCELLED | OUTPATIENT
Start: 2021-11-17

## 2021-11-17 RX ORDER — SODIUM CHLORIDE 0.9 % (FLUSH) 0.9 %
10 SYRINGE (ML) INJECTION AS NEEDED
Status: DISCONTINUED | OUTPATIENT
Start: 2021-11-17 | End: 2021-11-19 | Stop reason: HOSPADM

## 2021-11-17 RX ORDER — DOXYCYCLINE 100 MG/10ML
INJECTION, POWDER, LYOPHILIZED, FOR SOLUTION INTRAVENOUS
Status: COMPLETED
Start: 2021-11-17 | End: 2021-11-17

## 2021-11-17 RX ORDER — SODIUM CHLORIDE 0.9 % (FLUSH) 0.9 %
10 SYRINGE (ML) INJECTION EVERY 12 HOURS SCHEDULED
Status: DISCONTINUED | OUTPATIENT
Start: 2021-11-17 | End: 2021-11-19 | Stop reason: HOSPADM

## 2021-11-17 RX ADMIN — DOXYCYCLINE: 100 INJECTION, POWDER, LYOPHILIZED, FOR SOLUTION INTRAVENOUS at 02:00

## 2021-11-17 RX ADMIN — Medication 10 ML: at 08:48

## 2021-11-17 RX ADMIN — SODIUM CHLORIDE: 900 INJECTION INTRAVENOUS at 02:00

## 2021-11-17 RX ADMIN — ENOXAPARIN SODIUM 40 MG: 40 INJECTION SUBCUTANEOUS at 16:28

## 2021-11-17 RX ADMIN — POTASSIUM CHLORIDE 20 MEQ: 1500 TABLET, EXTENDED RELEASE ORAL at 08:47

## 2021-11-17 RX ADMIN — Medication 10 ML: at 22:32

## 2021-11-17 RX ADMIN — AZITHROMYCIN 500 MG: 500 INJECTION, POWDER, LYOPHILIZED, FOR SOLUTION INTRAVENOUS at 16:30

## 2021-11-17 RX ADMIN — ACETAMINOPHEN 650 MG: 325 TABLET, FILM COATED ORAL at 06:34

## 2021-11-17 RX ADMIN — CEFTRIAXONE SODIUM: 1 INJECTION, POWDER, FOR SOLUTION INTRAMUSCULAR; INTRAVENOUS at 02:00

## 2021-11-17 RX ADMIN — NINTEDANIB 150 MG: 150 CAPSULE ORAL at 22:58

## 2021-11-17 NOTE — CASE MANAGEMENT/SOCIAL WORK
Discharge Planning Assessment  [facility]     Patient Name: Willis Lechuga  MRN: [id number]  Today's Date: 11/17/2021    Admit Date: 11/16/2021     Discharge Needs Assessment     Row Name 11/17/21 7310       Living Environment    Lives With spouse    Name(s) of Who Lives With Patient Lula    Current Living Arrangements home/apartment/condo    Primary Care Provided by self    Provides Primary Care For no one    Family Caregiver if Needed spouse    Family Caregiver Names Lula    Quality of Family Relationships supportive; helpful    Able to Return to Prior Arrangements yes       Resource/Environmental Concerns    Resource/Environmental Concerns none       Transition Planning    Patient/Family Anticipates Transition to home with family    Patient/Family Anticipated Services at Transition none    Transportation Anticipated family or friend will provide  Daughter Kala Lechuga       Discharge Needs Assessment    Equipment Currently Used at Home cpap; grab bar; pulse ox; oxygen; other (see comments)  Pt has home O2 concentrator and Portable concentrator ([employer]) at 3L. DME provider is [facility]    Concerns to be Addressed denies needs/concerns at this time    Anticipated Changes Related to Illness none    Equipment Needed After Discharge none               Discharge Plan     Row Name 11/17/21 0957       Plan    Plan Home    Patient/Family in Agreement with Plan yes    Plan Comments Pt and spouse currently deny dc needs.Both state patient is independent with ADLs w/o use of DME.Pt reports he has walkers and canes at home if needed. Confirmed his PCP and pharmacy.Pt intends to return home with spouse at [place] and  no other services              Continued Care and Services - Admitted Since 11/16/2021    Coordination has not been started for this encounter.          Demographic Summary     Row Name 11/17/21 5529       General Information    Admission Type inpatient    Arrived From home    Required Notices Provided Important  Message from Medicare    Referral Source admission list; high risk screening    Reason for Consult discharge planning    Preferred Language English       Contact Information    Permission Granted to Share Info With ; family/designee    Contact Information Obtained for                Functional Status     Row Name 11/17/21 1555       Functional Status    Usual Activity Tolerance good    Current Activity Tolerance good       Functional Status, IADL    Medications independent    Meal Preparation independent    Housekeeping independent    Laundry independent    Shopping independent                      Patient Forms     Row Name 11/17/21 1558       Patient Forms    Important Message from Medicare (IMM) Delivered  IMM 11/17/21 confirmed in EPIC              Ankita Jackman RN, [facility]  Office: [phone]  Fax: [phone]  [email or web address]      I met with patient in room wearing PPE: mask and goggles.     Maintained distance greater than six feet and spent </=15 minutes in the room      Ankita Jackman RN
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
## 2021-11-17 NOTE — NURSING NOTE
Report called to nurse taking room 375.  Transport at bedside to take patient. Wife and belongings with patient.

## 2021-11-17 NOTE — PLAN OF CARE
Goal Outcome Evaluation:            Resting in bed, Vitals stable. Up and down to use urinal without difficulty. Remains short of breath with ambulation.    Will continue to monitor.

## 2021-11-17 NOTE — TELEPHONE ENCOUNTER
Spoke to wife she took him to [facility] yesterday because he spiked a fever he is now in hospital being treated for pneumonia,. I told her it sounded like more than just a sore throat when he called in . She said he was just at pulmonologist last Friday it just came on quick.
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
## 2021-11-18 ENCOUNTER — ANESTHESIA EVENT (OUTPATIENT)
Dept: GASTROENTEROLOGY | Facility: HOSPITAL | Age: 86
End: 2021-11-18

## 2021-11-18 ENCOUNTER — ANESTHESIA (OUTPATIENT)
Dept: GASTROENTEROLOGY | Facility: HOSPITAL | Age: 86
End: 2021-11-18

## 2021-11-18 ENCOUNTER — APPOINTMENT (OUTPATIENT)
Dept: CT IMAGING | Facility: HOSPITAL | Age: 86
End: 2021-11-18

## 2021-11-18 LAB
ANION GAP SERPL CALCULATED.3IONS-SCNC: 11 MMOL/L (ref 5–15)
B PARAPERT DNA SPEC QL NAA+PROBE: NOT DETECTED
B PERT DNA SPEC QL NAA+PROBE: NOT DETECTED
BUN SERPL-MCNC: 9 MG/DL (ref 8–23)
BUN/CREAT SERPL: 13.4 (ref 7–25)
C PNEUM DNA NPH QL NAA+NON-PROBE: NOT DETECTED
CALCIUM SPEC-SCNC: 8.2 MG/DL (ref 8.6–10.5)
CHLORIDE SERPL-SCNC: 99 MMOL/L (ref 98–107)
CO2 SERPL-SCNC: 25 MMOL/L (ref 22–29)
CREAT SERPL-MCNC: 0.67 MG/DL (ref 0.76–1.27)
FLUAV SUBTYP SPEC NAA+PROBE: NOT DETECTED
FLUBV RNA ISLT QL NAA+PROBE: NOT DETECTED
GFR SERPL CREATININE-BSD FRML MDRD: 112 ML/MIN/1.73
GLUCOSE SERPL-MCNC: 110 MG/DL (ref 65–99)
HADV DNA SPEC NAA+PROBE: NOT DETECTED
HCOV 229E RNA SPEC QL NAA+PROBE: NOT DETECTED
HCOV HKU1 RNA SPEC QL NAA+PROBE: NOT DETECTED
HCOV NL63 RNA SPEC QL NAA+PROBE: NOT DETECTED
HCOV OC43 RNA SPEC QL NAA+PROBE: NOT DETECTED
HMPV RNA NPH QL NAA+NON-PROBE: NOT DETECTED
HPIV1 RNA ISLT QL NAA+PROBE: NOT DETECTED
HPIV2 RNA SPEC QL NAA+PROBE: NOT DETECTED
HPIV3 RNA NPH QL NAA+PROBE: NOT DETECTED
HPIV4 P GENE NPH QL NAA+PROBE: NOT DETECTED
M PNEUMO IGG SER IA-ACNC: NOT DETECTED
POTASSIUM SERPL-SCNC: 3.8 MMOL/L (ref 3.5–5.2)
RHINOVIRUS RNA SPEC NAA+PROBE: NOT DETECTED
RSV RNA NPH QL NAA+NON-PROBE: NOT DETECTED
SODIUM SERPL-SCNC: 135 MMOL/L (ref 136–145)

## 2021-11-18 PROCEDURE — 25010000002 PROPOFOL 200 MG/20ML EMULSION: Performed by: ANESTHESIOLOGY

## 2021-11-18 PROCEDURE — 99233 SBSQ HOSP IP/OBS HIGH 50: CPT | Performed by: HOSPITALIST

## 2021-11-18 PROCEDURE — 88108 CYTOPATH CONCENTRATE TECH: CPT | Performed by: INTERNAL MEDICINE

## 2021-11-18 PROCEDURE — 87385 HISTOPLASMA CAPSUL AG IA: CPT | Performed by: INTERNAL MEDICINE

## 2021-11-18 PROCEDURE — 87102 FUNGUS ISOLATION CULTURE: CPT | Performed by: INTERNAL MEDICINE

## 2021-11-18 PROCEDURE — 71250 CT THORAX DX C-: CPT

## 2021-11-18 PROCEDURE — 87206 SMEAR FLUORESCENT/ACID STAI: CPT | Performed by: INTERNAL MEDICINE

## 2021-11-18 PROCEDURE — 87305 ASPERGILLUS AG IA: CPT | Performed by: INTERNAL MEDICINE

## 2021-11-18 PROCEDURE — 87496 CYTOMEG DNA AMP PROBE: CPT | Performed by: INTERNAL MEDICINE

## 2021-11-18 PROCEDURE — 0B9K8ZX DRAINAGE OF RIGHT LUNG, VIA NATURAL OR ARTIFICIAL OPENING ENDOSCOPIC, DIAGNOSTIC: ICD-10-PCS | Performed by: INTERNAL MEDICINE

## 2021-11-18 PROCEDURE — 87070 CULTURE OTHR SPECIMN AEROBIC: CPT | Performed by: INTERNAL MEDICINE

## 2021-11-18 PROCEDURE — 80048 BASIC METABOLIC PNL TOTAL CA: CPT | Performed by: HOSPITALIST

## 2021-11-18 PROCEDURE — 87798 DETECT AGENT NOS DNA AMP: CPT | Performed by: INTERNAL MEDICINE

## 2021-11-18 PROCEDURE — 25010000002 ENOXAPARIN PER 10 MG: Performed by: INTERNAL MEDICINE

## 2021-11-18 PROCEDURE — 87633 RESP VIRUS 12-25 TARGETS: CPT | Performed by: INTERNAL MEDICINE

## 2021-11-18 PROCEDURE — 87116 MYCOBACTERIA CULTURE: CPT | Performed by: INTERNAL MEDICINE

## 2021-11-18 PROCEDURE — 25010000002 CEFTRIAXONE PER 250 MG: Performed by: INTERNAL MEDICINE

## 2021-11-18 PROCEDURE — 87205 SMEAR GRAM STAIN: CPT | Performed by: INTERNAL MEDICINE

## 2021-11-18 RX ORDER — LIDOCAINE 50 MG/G
OINTMENT TOPICAL
Status: DISPENSED
Start: 2021-11-18 | End: 2021-11-19

## 2021-11-18 RX ORDER — LIDOCAINE HYDROCHLORIDE 20 MG/ML
INJECTION, SOLUTION EPIDURAL; INFILTRATION; INTRACAUDAL; PERINEURAL
Status: COMPLETED
Start: 2021-11-18 | End: 2021-11-18

## 2021-11-18 RX ORDER — LIDOCAINE HYDROCHLORIDE 20 MG/ML
INJECTION, SOLUTION EPIDURAL; INFILTRATION; INTRACAUDAL; PERINEURAL AS NEEDED
Status: DISCONTINUED | OUTPATIENT
Start: 2021-11-18 | End: 2021-11-18 | Stop reason: SURG

## 2021-11-18 RX ORDER — LIDOCAINE 50 MG/G
OINTMENT TOPICAL AS NEEDED
Status: DISCONTINUED | OUTPATIENT
Start: 2021-11-18 | End: 2021-11-18 | Stop reason: HOSPADM

## 2021-11-18 RX ORDER — LIDOCAINE HYDROCHLORIDE 20 MG/ML
INJECTION, SOLUTION INFILTRATION; PERINEURAL AS NEEDED
Status: DISCONTINUED | OUTPATIENT
Start: 2021-11-18 | End: 2021-11-18 | Stop reason: HOSPADM

## 2021-11-18 RX ORDER — SODIUM CHLORIDE 9 MG/ML
10 INJECTION, SOLUTION INTRAVENOUS ONCE
Status: COMPLETED | OUTPATIENT
Start: 2021-11-18 | End: 2021-11-18

## 2021-11-18 RX ORDER — PROPOFOL 10 MG/ML
INJECTION, EMULSION INTRAVENOUS AS NEEDED
Status: DISCONTINUED | OUTPATIENT
Start: 2021-11-18 | End: 2021-11-18 | Stop reason: SURG

## 2021-11-18 RX ORDER — ZOLPIDEM TARTRATE 5 MG/1
5 TABLET ORAL ONCE
Status: COMPLETED | OUTPATIENT
Start: 2021-11-18 | End: 2021-11-18

## 2021-11-18 RX ADMIN — LIDOCAINE HYDROCHLORIDE 50 MG: 20 INJECTION, SOLUTION EPIDURAL; INFILTRATION; INTRACAUDAL; PERINEURAL at 15:28

## 2021-11-18 RX ADMIN — SODIUM CHLORIDE 10 ML/HR: 9 INJECTION, SOLUTION INTRAVENOUS at 14:52

## 2021-11-18 RX ADMIN — CEFTRIAXONE SODIUM 1 G: 1 INJECTION, POWDER, FOR SOLUTION INTRAMUSCULAR; INTRAVENOUS at 03:46

## 2021-11-18 RX ADMIN — NINTEDANIB 100 MG: 100 CAPSULE ORAL at 20:20

## 2021-11-18 RX ADMIN — NINTEDANIB 100 MG: 100 CAPSULE ORAL at 09:58

## 2021-11-18 RX ADMIN — ZOLPIDEM TARTRATE 5 MG: 5 TABLET ORAL at 20:21

## 2021-11-18 RX ADMIN — LEVOTHYROXINE SODIUM 88 MCG: 0.09 TABLET ORAL at 08:41

## 2021-11-18 RX ADMIN — Medication 10 ML: at 20:22

## 2021-11-18 RX ADMIN — Medication 10 ML: at 09:58

## 2021-11-18 RX ADMIN — PROPOFOL 60 MG: 10 INJECTION, EMULSION INTRAVENOUS at 15:28

## 2021-11-18 RX ADMIN — ENOXAPARIN SODIUM 40 MG: 40 INJECTION SUBCUTANEOUS at 17:16

## 2021-11-18 NOTE — OP NOTE
Bronchoscopy Procedure Note    Procedure:  1. Bronchoscopy, Diagnostic  2. Bronchoalveolar lavage, BAL    Pre-Operative Diagnosis: Lung fibrosis and pneumonia    Post-Operative Diagnosis:   Severe inflammatory changes were noted  Moderate mucoid secretions suctioned therapeutic  Right lung washing    Anesthesia: Moderate Sedation    Procedure Details: Patient was consented for the procedure with all risk and benefit of the procedure explained in detail.  Patient was given the opportunity to ask questions and all concerns were answered.  The bronchocope was inserted into the main airway via the oropharynx. An anatomical survey was done of the main airways and the subsegmental bronchus to at least the first subsegmental level of all five lobes of both lungs.  The findings are reported below.  A bronchoalveolar lavage was performed using aliquots of normal saline instilled into the airways then aspirated back.    Findings:  Bronchoscope passed into oral cavity to the level of the vocal cords.  Lidocaine used for local anesthetic over vocal cords.  Bronchoscope was passed between the vocal cords into the trachea.  All airways were visualized to at least the first subsegment level of all 5 lobes of both lungs.     Severe inflammatory changes were noted  Moderate mucoid secretions suctioned therapeutic  Right lung washing      Patient tolerated procedure well        Estimated Blood Loss:  Minimal           Specimens:  Sent serosanguinous fluid                Complications:  None; patient tolerated the procedure well.           Disposition: PACU - hemodynamically stable.      Patient tolerated the procedure well.    Marj Vincent MD  11/18/2021  18:43 EST
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
## 2021-11-18 NOTE — PROGRESS NOTES
Daily Progress Note        Sepsis due to pneumonia (HCC)    Idiopathic pulmonary fibrosis (HCC)    Hypokalemia      Assessment    Progressive dyspnea due to lung fibrosis  Acute/chronic hypoxic respiratory failure patient on home oxygen     25 lb wt loss since starting OFEV  PET scan ordered as an outpatient for significant wt loss   Plan was to change OFEV to Esbriet for weight loss  Recently discontinued with prednisone 5 mg qd,  no significant response     IPF: Progressive loss of volume since 2019, Exertional hypoxia  PFT  7/2021 FEV1 58%, to 61%, ratio 87,  TLC 41%, RV 40% DLCO 33%   PFT 12/2020, FEV1 1.56 L , 61% improved to 67% post BD, ratio 89%, TLC 64%, RV 45%, DLCO 63%  PFTs 11/09/2019 FEV1 1.9 L which is 73%, FEV1/FVC ratio 90, TLC 62%, RV 63%, DLCO 44%,  velcro sounds heard on exam specially right lower lobe  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[employer], had carpet cleaning business, [employer] shop, etc  exposed to trichlorethylen, carobtetrachloride, non smoker    CT scan the chest evidence of IPF with honeycombing and traction bronchiectasis     WILMAR, residual AHI 9.2, compliance 96.2% > 4 hours   normal TODD, Anca, RF, Ace level, histoplasmosis antigen level     Hypothyroidism     Recommendations:     Bronchoscopy today to rule out opportunistic infection in the setting of  lung fibrosis    Antibiotics Rocephin    Patient to to continue home Ofev 100 mg twice daily  DVT prophylaxis            LOS: 1 day     Subjective         Objective     Vital signs for last 24 hours:  Vitals:    11/17/21 1646 11/17/21 1931 11/17/21 2336 11/18/21 0406   BP: 120/70 140/72 129/68 130/65   BP Location: Left arm Left arm Left arm Left arm   Patient Position: Lying Sitting Lying Lying   Pulse: 105 104 93 105   Resp: 18 18 18 20   Temp: 98.5 °F (36.9 °C) 98.1 °F (36.7 °C) 99 °F (37.2 °C) 99.3 °F (37.4 °C)   TempSrc: Oral Oral Oral Oral   SpO2: 97% 93% 97% 95%   Weight:       Height:           Intake/Output last 3 shifts:  I/O last 3  completed shifts:  In: 1560 [P.O.:560; IV Piggyback:1000]  Out: 1000 [Urine:1000]  Intake/Output this shift:  No intake/output data recorded.      Radiology  Imaging Results (Last 24 Hours)     ** No results found for the last 24 hours. **          Labs:  Results from last 7 days   Lab Units 11/17/21  0650   WBC 10*3/mm3 9.70   HEMOGLOBIN g/dL 10.8*   HEMATOCRIT % 32.1*   PLATELETS 10*3/mm3 198     Results from last 7 days   Lab Units 11/18/21  0558 11/17/21  0650 11/16/21  2259   SODIUM mmol/L 135*   < > 136   POTASSIUM mmol/L 3.8   < > 3.4*   CHLORIDE mmol/L 99   < > 96*   CO2 mmol/L 25.0   < > 27.0   BUN mg/dL 9   < > 11   CREATININE mg/dL 0.67*   < > 0.82   CALCIUM mg/dL 8.2*   < > 8.4*   BILIRUBIN mg/dL  --   --  0.5   ALK PHOS U/L  --   --  60   ALT (SGPT) U/L  --   --  23   AST (SGOT) U/L  --   --  22   GLUCOSE mg/dL 110*   < > 113*    < > = values in this interval not displayed.         Results from last 7 days   Lab Units 11/16/21  2259   ALBUMIN g/dL 3.70     Results from last 7 days   Lab Units 11/16/21  2259   TROPONIN T ng/mL <0.010                           Meds:   SCHEDULE  cefTRIAXone, 1 g, Intravenous, Q24H   And  azithromycin, 500 mg, Intravenous, Q24H  enoxaparin, 40 mg, Subcutaneous, Q24H  levothyroxine, 88 mcg, Oral, Daily  Nintedanib Esylate, 150 mg, Oral, Q12H  sodium chloride, 10 mL, Intravenous, Q12H      Infusions     PRNs  •  acetaminophen  •  diphenoxylate-atropine  •  ondansetron  •  sodium chloride  •  sodium chloride    Physical Exam:  Physical Exam  Vitals reviewed.   Pulmonary:      Breath sounds: Rhonchi present.      Comments: Crepitations noted in bilateral lower lobes  Skin:     General: Skin is warm and dry.      Coloration: Skin is pale.   Neurological:      Mental Status: He is alert and oriented to person, place, and time.         ROS  Review of Systems   Respiratory: Positive for cough and shortness of breath.      Reviewed the patient's new clinical  results    Electronically signed by KIMMIE Reyes

## 2021-11-18 NOTE — ANESTHESIA PREPROCEDURE EVALUATION
Anesthesia Evaluation     Patient summary reviewed and Nursing notes reviewed   NPO Solid Status: > 8 hours  NPO Liquid Status: > 8 hours           Airway   Mallampati: I  TM distance: >3 FB  Neck ROM: full  No difficulty expected  Dental - normal exam     Pulmonary    (+) pneumonia , shortness of breath, decreased breath sounds,     ROS comment: Severe pulmonary fibrosis  Cardiovascular - normal exam    ECG reviewed    (+) hypertension, hyperlipidemia,     ROS comment: NANCI 2021:  Interpretation Summary  Estimated left ventricular EF was in agreement with the calculated left ventricular EF. Left ventricular ejection fraction appears to be 56 - 60%. Left ventricular systolic function is normal        Neuro/Psych- negative ROS  GI/Hepatic/Renal/Endo    (+)   thyroid problem hypothyroidism    Musculoskeletal     (+) neck pain,   Abdominal  - normal exam    Bowel sounds: normal.   Substance History - negative use     OB/GYN negative ob/gyn ROS         Other   arthritis,                    Anesthesia Plan    ASA 3     MAC   total IV anesthesia  intravenous induction     Anesthetic plan, all risks, benefits, and alternatives have been provided, discussed and informed consent has been obtained with: patient.
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
## 2021-11-18 NOTE — NURSING NOTE
Prior to transporting patient to procedure room for scheduled bronchoscopy, patient stated that when the pre op door was closed he turned his head to the right and in the bottom of the doorway, he seen a red marble roll to the right. Patient stated that this has been happening since he was admitted to the hospital, but has not mention it to any his providers or bedside nurses. Patient was alert and oriented x 4. Notified Dr. Vincent and bedside RN.

## 2021-11-18 NOTE — PLAN OF CARE
Goal Outcome Evaluation:              Outcome Summary: Pt resting with no c/o of soa.  O2 sats and VS wnl.  Bronch scheduled for this afternoon.

## 2021-11-19 ENCOUNTER — READMISSION MANAGEMENT (OUTPATIENT)
Dept: CALL CENTER | Facility: HOSPITAL | Age: 86
End: 2021-11-19

## 2021-11-19 VITALS
OXYGEN SATURATION: 99 % | BODY MASS INDEX: 26.81 KG/M2 | TEMPERATURE: 98.4 F | HEART RATE: 78 BPM | SYSTOLIC BLOOD PRESSURE: 126 MMHG | RESPIRATION RATE: 20 BRPM | HEIGHT: 69 IN | WEIGHT: 181 LBS | DIASTOLIC BLOOD PRESSURE: 63 MMHG

## 2021-11-19 PROBLEM — E87.6 HYPOKALEMIA: Status: RESOLVED | Noted: 2021-11-17 | Resolved: 2021-11-19

## 2021-11-19 PROBLEM — J18.9 SEPSIS DUE TO PNEUMONIA: Status: RESOLVED | Noted: 2021-11-17 | Resolved: 2021-11-19

## 2021-11-19 PROBLEM — A41.9 SEPSIS DUE TO PNEUMONIA: Status: RESOLVED | Noted: 2021-11-17 | Resolved: 2021-11-19

## 2021-11-19 LAB
LAB AP CASE REPORT: NORMAL
PATH REPORT.FINAL DX SPEC: NORMAL
PATH REPORT.GROSS SPEC: NORMAL

## 2021-11-19 PROCEDURE — 99239 HOSP IP/OBS DSCHRG MGMT >30: CPT | Performed by: HOSPITALIST

## 2021-11-19 PROCEDURE — 25010000002 CEFTRIAXONE PER 250 MG

## 2021-11-19 RX ORDER — ASPIRIN 81 MG/1
81 TABLET ORAL DAILY
Status: DISCONTINUED | OUTPATIENT
Start: 2021-11-19 | End: 2021-11-19 | Stop reason: HOSPADM

## 2021-11-19 RX ORDER — AMOXICILLIN AND CLAVULANATE POTASSIUM 875; 125 MG/1; MG/1
1 TABLET, FILM COATED ORAL 2 TIMES DAILY
Qty: 14 TABLET | Refills: 0 | Status: SHIPPED | OUTPATIENT
Start: 2021-11-19 | End: 2021-11-19 | Stop reason: SDUPTHER

## 2021-11-19 RX ORDER — ATORVASTATIN CALCIUM 20 MG/1
20 TABLET, FILM COATED ORAL NIGHTLY
Status: DISCONTINUED | OUTPATIENT
Start: 2021-11-19 | End: 2021-11-19 | Stop reason: HOSPADM

## 2021-11-19 RX ORDER — AMOXICILLIN AND CLAVULANATE POTASSIUM 875; 125 MG/1; MG/1
1 TABLET, FILM COATED ORAL 2 TIMES DAILY
Qty: 14 TABLET | Refills: 0 | Status: SHIPPED | OUTPATIENT
Start: 2021-11-19 | End: 2021-11-26

## 2021-11-19 RX ADMIN — CEFTRIAXONE SODIUM 1 G: 1 INJECTION, POWDER, FOR SOLUTION INTRAMUSCULAR; INTRAVENOUS at 04:07

## 2021-11-19 RX ADMIN — Medication 10 ML: at 09:30

## 2021-11-19 RX ADMIN — NINTEDANIB 100 MG: 100 CAPSULE ORAL at 09:27

## 2021-11-19 RX ADMIN — LEVOTHYROXINE SODIUM 88 MCG: 0.09 TABLET ORAL at 09:27
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
## 2021-11-19 NOTE — PLAN OF CARE
Goal Outcome Evaluation:  Plan of Care Reviewed With: patient   Progress: improving   Pt was able to get much needed sleep due to inability to sleep for the past 4 days  asked aid to skip 0000 vitals so Pt could rest per nurse. Pt Reported feeling much better.
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
## 2021-11-19 NOTE — CASE MANAGEMENT/SOCIAL WORK
Case Management Discharge Note            Selected Continued Care - Discharged on 11/19/2021 Admission date: 11/16/2021 - Discharge disposition: Home or Self Care     Final Discharge Disposition Code: 01 - home or self-care

## 2021-11-19 NOTE — PROGRESS NOTES
Daily Progress Note        Sepsis due to pneumonia (HCC)    Idiopathic pulmonary fibrosis (HCC)    Hypokalemia      Assessment    Progressive dyspnea due to lung fibrosis  Acute/chronic hypoxic respiratory failure patient on home oxygen    Bronchoscopy 11/18/2021  Severe inflammatory changes were noted  Moderate mucoid secretions suctioned therapeutic  Right lung washing     25 lb wt loss since starting OFEV  PET scan ordered as an outpatient for significant wt loss   Plan was to change OFEV to Esbriet for weight loss  Recently discontinued with prednisone 5 mg qd,  no significant response     IPF: Progressive loss of volume since 2019, Exertional hypoxia  PFT  7/2021 FEV1 58%, to 61%, ratio 87,  TLC 41%, RV 40% DLCO 33%   PFT 12/2020, FEV1 1.56 L , 61% improved to 67% post BD, ratio 89%, TLC 64%, RV 45%, DLCO 63%  PFTs 11/09/2019 FEV1 1.9 L which is 73%, FEV1/FVC ratio 90, TLC 62%, RV 63%, DLCO 44%,  velcro sounds heard on exam specially right lower lobe  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[employer], had carpet cleaning business, [employer] shop, etc  exposed to trichlorethylen, carobtetrachloride, non smoker    CT scan the chest evidence of IPF with honeycombing and traction bronchiectasis     WILMAR, residual AHI 9.2, compliance 96.2% > 4 hours   normal TODD, Anca, RF, Ace level, histoplasmosis antigen level     Hypothyroidism     Recommendations:     Monitor BAL    Antibiotics Rocephin    Patient to to continue home Ofev 100 mg twice daily  DVT prophylaxis            LOS: 2 days     Subjective         Objective     Vital signs for last 24 hours:  Vitals:    11/18/21 1600 11/18/21 1937 11/19/21 0000 11/19/21 0445   BP: 137/65 119/63 119/63 126/63   BP Location: Right arm Left arm  Left arm   Patient Position: Sitting Lying  Lying   Pulse: 102 89 89 78   Resp: 22 20 20 20   Temp:  97.4 °F (36.3 °C) 97.4 °F (36.3 °C) 98.4 °F (36.9 °C)   TempSrc:  Oral Oral Oral   SpO2: 94% 97% 97% 99%   Weight:       Height:           Intake/Output last 3  shifts:  I/O last 3 completed shifts:  In: 1280 [P.O.:1280]  Out: 525 [Urine:525]  Intake/Output this shift:  No intake/output data recorded.      Radiology  Imaging Results (Last 24 Hours)     Procedure Component Value Units Date/Time    CT Chest Without Contrast Diagnostic [[id number]] Collected: 11/18/21 0907     Updated: 11/18/21 0916    Narrative:      CT CHEST WO CONTRAST DIAGNOSTIC-     Date of Exam: 11/18/2021 8:54 AM     Indication: Interstitial lung disease; J18.9-Pneumonia, unspecified  organism; J96.01-Acute respiratory failure with hypoxia; Z20.822-Contact  with and (suspected) exposure to covid-19; J18.9-Pneumonia, unspecified  organism; A41.9-Sepsis, unspecified organism; J84.112-Idiopathic  pulmonary fibrosis.     Comparison Exams: Chest radiograph from November 16, 2021 and CT chest  from July 30, 2021     Technique: CT scan of the chest without IV contrast. Automated exposure  control and iterative reconstruction methods were used.     FINDINGS:  The central tracheobronchial tree is clear. Severe interstitial  pulmonary fibrosis appears unchanged from prior exam. Small bilateral  pulmonary nodules appear stable, likely benign. There is no pleural  effusion.     The heart size appears normal, with partially calcified atherosclerotic  disease in the coronary arteries. The great vessels are normal in  caliber. No abnormally enlarged lymph nodes are identified.     Partial evaluation of the upper abdomen demonstrates cholelithiasis.     No aggressive osseous lesions are identified.       Impression:      1.Severe interstitial pulmonary fibrosis appears unchanged from prior  CT. A superimposed component of pneumonia is possible.  2.Cholelithiasis.     Electronically Signed By-Ming Finney MD On:11/18/2021 9:14 AM  This report was finalized on [id number] by  Ming Finney MD.          Labs:  Results from last 7 days   Lab Units 11/17/21  0650   WBC 10*3/mm3 9.70   HEMOGLOBIN g/dL 10.8*    HEMATOCRIT % 32.1*   PLATELETS 10*3/mm3 198     Results from last 7 days   Lab Units 11/18/21  0558 11/17/21  0650 11/16/21  2259   SODIUM mmol/L 135*   < > 136   POTASSIUM mmol/L 3.8   < > 3.4*   CHLORIDE mmol/L 99   < > 96*   CO2 mmol/L 25.0   < > 27.0   BUN mg/dL 9   < > 11   CREATININE mg/dL 0.67*   < > 0.82   CALCIUM mg/dL 8.2*   < > 8.4*   BILIRUBIN mg/dL  --   --  0.5   ALK PHOS U/L  --   --  60   ALT (SGPT) U/L  --   --  23   AST (SGOT) U/L  --   --  22   GLUCOSE mg/dL 110*   < > 113*    < > = values in this interval not displayed.         Results from last 7 days   Lab Units 11/16/21  2259   ALBUMIN g/dL 3.70     Results from last 7 days   Lab Units 11/16/21  2259   TROPONIN T ng/mL <0.010                           Meds:   SCHEDULE  cefTRIAXone, 1 g, Intravenous, Q24H  enoxaparin, 40 mg, Subcutaneous, Q24H  levothyroxine, 88 mcg, Oral, Daily  Nintedanib Esylate, 100 mg, Oral, BID  sodium chloride, 10 mL, Intravenous, Q12H      Infusions     PRNs  •  acetaminophen  •  diphenoxylate-atropine  •  ondansetron  •  pharmacy  •  sodium chloride  •  sodium chloride    Physical Exam:  Physical Exam  Vitals reviewed.   Pulmonary:      Breath sounds: Rhonchi present.      Comments: Crepitations noted in bilateral lower lobes  Skin:     General: Skin is warm and dry.      Coloration: Skin is pale.   Neurological:      Mental Status: He is alert and oriented to person, place, and time.         ROS  Review of Systems   Respiratory: Positive for cough and shortness of breath.      Reviewed the patient's new clinical results    Electronically signed by KIMMIE Reyes

## 2021-11-20 LAB
BACTERIA SPEC RESP CULT: NORMAL
GRAM STN SPEC: NORMAL

## 2021-11-20 NOTE — OUTREACH NOTE
Prep Survey      Responses   [facility] facility patient discharged from? [facility]   Is LACE score < 7 ? Yes   Emergency Room discharge w/ pulse ox? No   Eligibility [facility]   Date of Admission 11/16/21   Date of Discharge 11/19/21   Discharge Disposition Home or Self Care   Discharge diagnosis sepsis d/t pna   Does the patient have one of the following disease processes/diagnoses(primary or secondary)? Sepsis   Does the patient have Home health ordered? No   Is there a DME ordered? No   Prep survey completed? Yes          Radha Nicholson RN

## 2021-11-21 LAB
BACTERIA SPEC AEROBE CULT: NORMAL
INTERPRETATION: NEGATIVE
RESULT: NORMAL NG/ML

## 2021-11-22 ENCOUNTER — TRANSITIONAL CARE MANAGEMENT TELEPHONE ENCOUNTER (OUTPATIENT)
Dept: CALL CENTER | Facility: HOSPITAL | Age: 86
End: 2021-11-22

## 2021-11-22 LAB
BACTERIA SPEC AEROBE CULT: NORMAL
CMV DNA SPEC QL NAA+PROBE: NEGATIVE
GALACTOMANNAN AG SPEC IA-ACNC: 0.28 INDEX (ref 0–0.49)
P JIROVECII DNA # SPEC NAA+PROBE: NEGATIVE {COPIES}/ML
SPECIMEN SOURCE: NORMAL
SPECIMEN SOURCE: NORMAL

## 2021-11-22 NOTE — OUTREACH NOTE
Call Center TCM Note      Responses   [facility] patient discharged from? [facility]   Does the patient have one of the following disease processes/diagnoses(primary or secondary)? Sepsis   TCM attempt successful? Yes   Discharge diagnosis sepsis d/t pna   Meds reviewed with patient/caregiver? Yes   Is the patient having any side effects they believe may be caused by any medication additions or changes? No   Does the patient have all medications related to this admission filled (includes all antibiotics, inhalers, nebulizers,steroids,etc.) Yes   Is the patient taking all medications as directed (includes completed medication regime)? Yes   Does the patient have a primary care provider?  Yes   Comments regarding PCP PCP Dr Blake Giraldo has no appts avail within TCM time frame. I am asking [facility] to please review sched and call pt for TCM FWP to be completed by 12/03/2021.    Does the patient have an appointment with their PCP within 7 days of discharge? No   Has the patient kept scheduled appointments due by today? N/A   Has home health visited the patient within 72 hours of discharge? N/A   Psychosocial issues? No   Did the patient receive a copy of their discharge instructions? Yes   Nursing interventions Reviewed instructions with patient   What is the patient's perception of their health status since discharge? Improving   Nursing interventions Nurse provided patient education   Is the patient/caregiver able to teach back Sepsis? S - Shivering,fever or very cold,  E - Extreme pain or generalized discomfort (worst ever,especially abdomen),  P - Pale or discolored skin,  S - Sleepy, difficult to arouse,confused,  I -   I feel like I might die-a feeling of hopelessness,  S - Short of breath   Is patient/caregiver able to teach back steps to recovery at home? Set small, achievable goals for return to baseline health,  Record milestones and struggles in a journal,  Exercise as tolerated,  Make a list of questions for  PCP appoinment,  Learn about sepsis([email or web address]),  Rest and regain strength,  Talk about feelings with family/friends,  Eat a balanced diet   Is the patient/caregiver able to teach back signs and symptoms of worsening condition: Fever,  Altered mental status(confusion/coma),  Edema,  Hyperthermia,  Rapid heart rate (>90),  High blood glucose without diabetes,  Shortness of breath/rapid respiratory rate   If the patient is a current smoker, are they able to teach back resources for cessation? Not a smoker   TCM call completed? Yes   Wrap up additional comments Pt states he is feeling much better s/p dx: PNA W.SEPSI. Amoxicillin in place. **OF NOTE FOR CASE MGMT. Pt was noted to have open wound on rt hip, States ID said he would be sent home with printed wound care instructions but he does not have this**. PCP Dr Blake Giraldo has no appts avail within TCM time frame. I am asking [facility] to please review sched and call pt for TCM FWP to be completed by 12/03/2021.           Gillian Gray MA    11/22/2021, 15:08 EST

## 2021-12-16 LAB — FUNGUS WND CULT: ABNORMAL

## 2021-12-21 ENCOUNTER — OFFICE VISIT (OUTPATIENT)
Dept: WOUND CARE | Facility: HOSPITAL | Age: 86
End: 2021-12-21

## 2021-12-21 DIAGNOSIS — T81.31XA DEHISCENCE OF OPERATIVE WOUND, INITIAL ENCOUNTER: Primary | ICD-10-CM

## 2021-12-21 PROCEDURE — 87077 CULTURE AEROBIC IDENTIFY: CPT

## 2021-12-21 PROCEDURE — 87186 SC STD MICRODIL/AGAR DIL: CPT

## 2021-12-21 PROCEDURE — 87205 SMEAR GRAM STAIN: CPT

## 2021-12-21 PROCEDURE — 87070 CULTURE OTHR SPECIMN AEROBIC: CPT

## 2021-12-21 PROCEDURE — G0463 HOSPITAL OUTPT CLINIC VISIT: HCPCS

## 2021-12-23 LAB
BACTERIA SPEC AEROBE CULT: ABNORMAL
GRAM STN SPEC: ABNORMAL
GRAM STN SPEC: ABNORMAL

## 2021-12-28 ENCOUNTER — TRANSCRIBE ORDERS (OUTPATIENT)
Dept: CARDIAC REHAB | Facility: HOSPITAL | Age: 86
End: 2021-12-28

## 2021-12-28 DIAGNOSIS — J84.9 INTERSTITIAL PULMONARY DISEASE, UNSPECIFIED (HCC): Primary | ICD-10-CM

## 2021-12-30 ENCOUNTER — OFFICE VISIT (OUTPATIENT)
Dept: CARDIAC REHAB | Facility: HOSPITAL | Age: 86
End: 2021-12-30

## 2021-12-30 ENCOUNTER — LAB (OUTPATIENT)
Dept: LAB | Facility: HOSPITAL | Age: 86
End: 2021-12-30

## 2021-12-30 ENCOUNTER — TRANSCRIBE ORDERS (OUTPATIENT)
Dept: ADMINISTRATIVE | Facility: HOSPITAL | Age: 86
End: 2021-12-30

## 2021-12-30 DIAGNOSIS — J98.4 CHRONIC RESTRICTIVE LUNG DISEASE: Primary | ICD-10-CM

## 2021-12-30 DIAGNOSIS — T81.31XA DISRUPTION OF EXTERNAL OPERATION (SURGICAL) WOUND, NOT ELSEWHERE CLASSIFIED, INITIAL ENCOUNTER: Primary | ICD-10-CM

## 2021-12-30 DIAGNOSIS — T81.31XA DISRUPTION OF EXTERNAL OPERATION (SURGICAL) WOUND, NOT ELSEWHERE CLASSIFIED, INITIAL ENCOUNTER: ICD-10-CM

## 2021-12-30 LAB
ALBUMIN SERPL-MCNC: 4.3 G/DL (ref 3.5–5.2)
ALBUMIN/GLOB SERPL: 1.5 G/DL
ALP SERPL-CCNC: 46 U/L (ref 39–117)
ALT SERPL W P-5'-P-CCNC: 15 U/L (ref 1–41)
ANION GAP SERPL CALCULATED.3IONS-SCNC: 5.8 MMOL/L (ref 5–15)
AST SERPL-CCNC: 23 U/L (ref 1–40)
BASOPHILS # BLD AUTO: 0.04 10*3/MM3 (ref 0–0.2)
BASOPHILS NFR BLD AUTO: 0.4 % (ref 0–1.5)
BILIRUB SERPL-MCNC: 0.5 MG/DL (ref 0–1.2)
BUN SERPL-MCNC: 14 MG/DL (ref 8–23)
BUN/CREAT SERPL: 19.2 (ref 7–25)
CALCIUM SPEC-SCNC: 9.4 MG/DL (ref 8.6–10.5)
CHLORIDE SERPL-SCNC: 97 MMOL/L (ref 98–107)
CO2 SERPL-SCNC: 33.2 MMOL/L (ref 22–29)
CREAT SERPL-MCNC: 0.73 MG/DL (ref 0.76–1.27)
DEPRECATED RDW RBC AUTO: 45.3 FL (ref 37–54)
EOSINOPHIL # BLD AUTO: 0.33 10*3/MM3 (ref 0–0.4)
EOSINOPHIL NFR BLD AUTO: 3.4 % (ref 0.3–6.2)
ERYTHROCYTE [DISTWIDTH] IN BLOOD BY AUTOMATED COUNT: 12.8 % (ref 12.3–15.4)
GFR SERPL CREATININE-BSD FRML MDRD: 102 ML/MIN/1.73
GLOBULIN UR ELPH-MCNC: 2.8 GM/DL
GLUCOSE SERPL-MCNC: 108 MG/DL (ref 65–99)
HBA1C MFR BLD: 5.7 % (ref 3.5–5.6)
HCT VFR BLD AUTO: 39 % (ref 37.5–51)
HGB BLD-MCNC: 12.3 G/DL (ref 13–17.7)
IMM GRANULOCYTES # BLD AUTO: 0.03 10*3/MM3 (ref 0–0.05)
IMM GRANULOCYTES NFR BLD AUTO: 0.3 % (ref 0–0.5)
LYMPHOCYTES # BLD AUTO: 2.13 10*3/MM3 (ref 0.7–3.1)
LYMPHOCYTES NFR BLD AUTO: 22 % (ref 19.6–45.3)
MCH RBC QN AUTO: 30.5 PG (ref 26.6–33)
MCHC RBC AUTO-ENTMCNC: 31.5 G/DL (ref 31.5–35.7)
MCV RBC AUTO: 96.8 FL (ref 79–97)
MONOCYTES # BLD AUTO: 0.65 10*3/MM3 (ref 0.1–0.9)
MONOCYTES NFR BLD AUTO: 6.7 % (ref 5–12)
MYCOBACTERIUM SPEC CULT: NORMAL
NEUTROPHILS NFR BLD AUTO: 6.51 10*3/MM3 (ref 1.7–7)
NEUTROPHILS NFR BLD AUTO: 67.2 % (ref 42.7–76)
NIGHT BLUE STAIN TISS: NORMAL
NRBC BLD AUTO-RTO: 0 /100 WBC (ref 0–0.2)
PLATELET # BLD AUTO: 246 10*3/MM3 (ref 140–450)
PMV BLD AUTO: 10.7 FL (ref 6–12)
POTASSIUM SERPL-SCNC: 4.2 MMOL/L (ref 3.5–5.2)
PREALB SERPL-MCNC: 25.8 MG/DL (ref 20–40)
PROT SERPL-MCNC: 7.1 G/DL (ref 6–8.5)
RBC # BLD AUTO: 4.03 10*6/MM3 (ref 4.14–5.8)
SODIUM SERPL-SCNC: 136 MMOL/L (ref 136–145)
WBC NRBC COR # BLD: 9.69 10*3/MM3 (ref 3.4–10.8)

## 2021-12-30 PROCEDURE — G0238 OTH RESP PROC, INDIV: HCPCS

## 2021-12-30 PROCEDURE — 80053 COMPREHEN METABOLIC PANEL: CPT

## 2021-12-30 PROCEDURE — G0237 THERAPEUTIC PROCD STRG ENDUR: HCPCS

## 2021-12-30 PROCEDURE — 36415 COLL VENOUS BLD VENIPUNCTURE: CPT

## 2021-12-30 PROCEDURE — 84134 ASSAY OF PREALBUMIN: CPT

## 2021-12-30 PROCEDURE — 85025 COMPLETE CBC W/AUTO DIFF WBC: CPT

## 2021-12-30 PROCEDURE — 83036 HEMOGLOBIN GLYCOSYLATED A1C: CPT

## 2022-01-04 ENCOUNTER — LAB (OUTPATIENT)
Dept: LAB | Facility: HOSPITAL | Age: 87
End: 2022-01-04

## 2022-01-04 ENCOUNTER — OFFICE VISIT (OUTPATIENT)
Dept: WOUND CARE | Facility: HOSPITAL | Age: 87
End: 2022-01-04

## 2022-01-04 ENCOUNTER — APPOINTMENT (OUTPATIENT)
Dept: CARDIAC REHAB | Facility: HOSPITAL | Age: 87
End: 2022-01-04

## 2022-01-04 DIAGNOSIS — Z01.818 OTHER SPECIFIED PRE-OPERATIVE EXAMINATION: ICD-10-CM

## 2022-01-04 LAB — SARS-COV-2 ORF1AB RESP QL NAA+PROBE: DETECTED

## 2022-01-04 PROCEDURE — U0005 INFEC AGEN DETEC AMPLI PROBE: HCPCS

## 2022-01-04 PROCEDURE — C9803 HOPD COVID-19 SPEC COLLECT: HCPCS

## 2022-01-04 PROCEDURE — G0463 HOSPITAL OUTPT CLINIC VISIT: HCPCS

## 2022-01-04 PROCEDURE — U0004 COV-19 TEST NON-CDC HGH THRU: HCPCS

## 2022-01-06 ENCOUNTER — APPOINTMENT (OUTPATIENT)
Dept: CARDIAC REHAB | Facility: HOSPITAL | Age: 87
End: 2022-01-06

## 2022-01-06 ENCOUNTER — HOSPITAL ENCOUNTER (OUTPATIENT)
Dept: RESPIRATORY THERAPY | Facility: HOSPITAL | Age: 87
End: 2022-01-06

## 2022-01-10 RX ORDER — ATORVASTATIN CALCIUM 20 MG/1
TABLET, FILM COATED ORAL
Qty: 90 TABLET | Refills: 3 | Status: SHIPPED | OUTPATIENT
Start: 2022-01-10 | End: 2023-03-29

## 2022-01-11 ENCOUNTER — APPOINTMENT (OUTPATIENT)
Dept: CARDIAC REHAB | Facility: HOSPITAL | Age: 87
End: 2022-01-11

## 2022-01-11 ENCOUNTER — APPOINTMENT (OUTPATIENT)
Dept: WOUND CARE | Facility: HOSPITAL | Age: 87
End: 2022-01-11

## 2022-01-13 ENCOUNTER — APPOINTMENT (OUTPATIENT)
Dept: CARDIAC REHAB | Facility: HOSPITAL | Age: 87
End: 2022-01-13

## 2022-01-14 ENCOUNTER — TELEPHONE (OUTPATIENT)
Dept: FAMILY MEDICINE CLINIC | Facility: CLINIC | Age: 87
End: 2022-01-14

## 2022-01-14 ENCOUNTER — PATIENT MESSAGE (OUTPATIENT)
Dept: FAMILY MEDICINE CLINIC | Facility: CLINIC | Age: 87
End: 2022-01-14

## 2022-01-14 ENCOUNTER — APPOINTMENT (OUTPATIENT)
Dept: WOUND CARE | Facility: HOSPITAL | Age: 87
End: 2022-01-14

## 2022-01-14 NOTE — TELEPHONE ENCOUNTER
----- Message from Willis Lechuga sent at 1/14/2022  9:53 AM EST -----  Regarding: Prescription for Dexamethasone  The prescription for Kala was for 42 pills, which was at least 10 short of what you had prescribed for Kala Cotton and myself.  Kala's insurance will not allow a refill until 2/11/22.  Perhaps another priscription for a small quantity in my name from [facility] on [facility] and [facility] will finish the Steroid dosage.  Willis Lechuga ([birth year])

## 2022-01-15 NOTE — TELEPHONE ENCOUNTER
Tell everyone to start on the dexamethasone as prescribed and lets give it at least a week before we decide if anybody needs anymore.  Chances are one of the 3 is going to be doing better and can stop the dexamethasone early and someone else can use the extras that they leave behind.  Most of the time patients are not  having to finish the complete 10-day course.  If all 3 need a full 10-day course I will simply call more in for one of them.

## 2022-01-18 ENCOUNTER — APPOINTMENT (OUTPATIENT)
Dept: CARDIAC REHAB | Facility: HOSPITAL | Age: 87
End: 2022-01-18

## 2022-01-18 ENCOUNTER — OFFICE VISIT (OUTPATIENT)
Dept: WOUND CARE | Facility: HOSPITAL | Age: 87
End: 2022-01-18

## 2022-01-18 PROCEDURE — G0463 HOSPITAL OUTPT CLINIC VISIT: HCPCS

## 2022-01-20 ENCOUNTER — APPOINTMENT (OUTPATIENT)
Dept: CARDIAC REHAB | Facility: HOSPITAL | Age: 87
End: 2022-01-20

## 2022-01-25 ENCOUNTER — APPOINTMENT (OUTPATIENT)
Dept: RESPIRATORY THERAPY | Facility: HOSPITAL | Age: 87
End: 2022-01-25

## 2022-01-25 ENCOUNTER — APPOINTMENT (OUTPATIENT)
Dept: CARDIAC REHAB | Facility: HOSPITAL | Age: 87
End: 2022-01-25

## 2022-01-25 ENCOUNTER — OFFICE VISIT (OUTPATIENT)
Dept: WOUND CARE | Facility: HOSPITAL | Age: 87
End: 2022-01-25

## 2022-01-27 ENCOUNTER — TREATMENT (OUTPATIENT)
Dept: CARDIAC REHAB | Facility: HOSPITAL | Age: 87
End: 2022-01-27

## 2022-01-27 DIAGNOSIS — J98.4 CHRONIC RESTRICTIVE LUNG DISEASE: Primary | ICD-10-CM

## 2022-01-27 PROCEDURE — G0238 OTH RESP PROC, INDIV: HCPCS

## 2022-01-27 PROCEDURE — G0237 THERAPEUTIC PROCD STRG ENDUR: HCPCS

## 2022-02-01 ENCOUNTER — OFFICE VISIT (OUTPATIENT)
Dept: WOUND CARE | Facility: HOSPITAL | Age: 87
End: 2022-02-01

## 2022-02-01 ENCOUNTER — TREATMENT (OUTPATIENT)
Dept: CARDIAC REHAB | Facility: HOSPITAL | Age: 87
End: 2022-02-01

## 2022-02-01 DIAGNOSIS — J98.4 CHRONIC RESTRICTIVE LUNG DISEASE: Primary | ICD-10-CM

## 2022-02-01 PROCEDURE — G0238 OTH RESP PROC, INDIV: HCPCS

## 2022-02-01 PROCEDURE — G0463 HOSPITAL OUTPT CLINIC VISIT: HCPCS

## 2022-02-01 PROCEDURE — G0237 THERAPEUTIC PROCD STRG ENDUR: HCPCS

## 2022-02-03 ENCOUNTER — APPOINTMENT (OUTPATIENT)
Dept: CARDIAC REHAB | Facility: HOSPITAL | Age: 87
End: 2022-02-03

## 2022-02-08 ENCOUNTER — OFFICE VISIT (OUTPATIENT)
Dept: WOUND CARE | Facility: HOSPITAL | Age: 87
End: 2022-02-08

## 2022-02-08 ENCOUNTER — TREATMENT (OUTPATIENT)
Dept: CARDIAC REHAB | Facility: HOSPITAL | Age: 87
End: 2022-02-08

## 2022-02-08 DIAGNOSIS — J98.4 CHRONIC RESTRICTIVE LUNG DISEASE: Primary | ICD-10-CM

## 2022-02-08 PROCEDURE — G0463 HOSPITAL OUTPT CLINIC VISIT: HCPCS

## 2022-02-08 PROCEDURE — G0238 OTH RESP PROC, INDIV: HCPCS

## 2022-02-08 PROCEDURE — G0237 THERAPEUTIC PROCD STRG ENDUR: HCPCS

## 2022-02-10 ENCOUNTER — TREATMENT (OUTPATIENT)
Dept: CARDIAC REHAB | Facility: HOSPITAL | Age: 87
End: 2022-02-10

## 2022-02-10 DIAGNOSIS — J98.4 CHRONIC RESTRICTIVE LUNG DISEASE: Primary | ICD-10-CM

## 2022-02-10 PROCEDURE — G0237 THERAPEUTIC PROCD STRG ENDUR: HCPCS

## 2022-02-10 PROCEDURE — G0238 OTH RESP PROC, INDIV: HCPCS

## 2022-02-11 ENCOUNTER — HOSPITAL ENCOUNTER (OUTPATIENT)
Dept: RESPIRATORY THERAPY | Facility: HOSPITAL | Age: 87
Discharge: HOME | End: 2022-02-11
Admitting: INTERNAL MEDICINE

## 2022-02-11 DIAGNOSIS — J84.112 IPF (IDIOPATHIC PULMONARY FIBROSIS): ICD-10-CM

## 2022-02-11 PROCEDURE — 94060 EVALUATION OF WHEEZING: CPT

## 2022-02-11 PROCEDURE — 63710000001 ALBUTEROL SULFATE HFA 108 (90 BASE) MCG/ACT AEROSOL SOLUTION 6.7 G INHALER: Performed by: INTERNAL MEDICINE

## 2022-02-11 PROCEDURE — 94729 DIFFUSING CAPACITY: CPT

## 2022-02-11 PROCEDURE — 94726 PLETHYSMOGRAPHY LUNG VOLUMES: CPT

## 2022-02-11 PROCEDURE — A9270 NON-COVERED ITEM OR SERVICE: HCPCS | Performed by: INTERNAL MEDICINE

## 2022-02-11 RX ORDER — ALBUTEROL SULFATE 90 UG/1
2 AEROSOL, METERED RESPIRATORY (INHALATION) ONCE
Status: COMPLETED | OUTPATIENT
Start: 2022-02-11 | End: 2022-02-11

## 2022-02-11 RX ADMIN — ALBUTEROL SULFATE 2 PUFF: 108 INHALANT RESPIRATORY (INHALATION) at 13:40

## 2022-02-15 ENCOUNTER — TREATMENT (OUTPATIENT)
Dept: CARDIAC REHAB | Facility: HOSPITAL | Age: 87
End: 2022-02-15

## 2022-02-15 ENCOUNTER — OFFICE VISIT (OUTPATIENT)
Dept: WOUND CARE | Facility: HOSPITAL | Age: 87
End: 2022-02-15

## 2022-02-15 DIAGNOSIS — J98.4 CHRONIC RESTRICTIVE LUNG DISEASE: Primary | ICD-10-CM

## 2022-02-15 PROCEDURE — G0237 THERAPEUTIC PROCD STRG ENDUR: HCPCS

## 2022-02-15 PROCEDURE — G0238 OTH RESP PROC, INDIV: HCPCS

## 2022-02-15 PROCEDURE — G0463 HOSPITAL OUTPT CLINIC VISIT: HCPCS

## 2022-02-17 ENCOUNTER — TREATMENT (OUTPATIENT)
Dept: CARDIAC REHAB | Facility: HOSPITAL | Age: 87
End: 2022-02-17

## 2022-02-17 DIAGNOSIS — J98.4 CHRONIC RESTRICTIVE LUNG DISEASE: Primary | ICD-10-CM

## 2022-02-17 PROCEDURE — G0237 THERAPEUTIC PROCD STRG ENDUR: HCPCS

## 2022-02-17 PROCEDURE — G0238 OTH RESP PROC, INDIV: HCPCS

## 2022-02-22 ENCOUNTER — TREATMENT (OUTPATIENT)
Dept: CARDIAC REHAB | Facility: HOSPITAL | Age: 87
End: 2022-02-22

## 2022-02-22 DIAGNOSIS — J98.4 CHRONIC RESTRICTIVE LUNG DISEASE: Primary | ICD-10-CM

## 2022-02-22 PROCEDURE — G0237 THERAPEUTIC PROCD STRG ENDUR: HCPCS

## 2022-02-22 PROCEDURE — G0238 OTH RESP PROC, INDIV: HCPCS

## 2022-02-24 ENCOUNTER — TREATMENT (OUTPATIENT)
Dept: CARDIAC REHAB | Facility: HOSPITAL | Age: 87
End: 2022-02-24

## 2022-02-24 DIAGNOSIS — J98.4 CHRONIC RESTRICTIVE LUNG DISEASE: Primary | ICD-10-CM

## 2022-02-24 PROCEDURE — G0237 THERAPEUTIC PROCD STRG ENDUR: HCPCS

## 2022-02-24 PROCEDURE — G0238 OTH RESP PROC, INDIV: HCPCS

## 2022-02-28 ENCOUNTER — TRANSCRIBE ORDERS (OUTPATIENT)
Dept: ADMINISTRATIVE | Facility: HOSPITAL | Age: 87
End: 2022-02-28

## 2022-02-28 ENCOUNTER — LAB (OUTPATIENT)
Dept: LAB | Facility: HOSPITAL | Age: 87
End: 2022-02-28

## 2022-02-28 DIAGNOSIS — H46.9 OPTIC NEUROPATHY: ICD-10-CM

## 2022-02-28 DIAGNOSIS — I99.8 ISCHEMIC: Primary | ICD-10-CM

## 2022-02-28 DIAGNOSIS — I99.8 ISCHEMIC: ICD-10-CM

## 2022-02-28 LAB
BASOPHILS # BLD AUTO: 0 10*3/MM3 (ref 0–0.2)
BASOPHILS NFR BLD AUTO: 0.6 % (ref 0–1.5)
CRP SERPL-MCNC: 0.44 MG/DL (ref 0–0.5)
DEPRECATED RDW RBC AUTO: 46.4 FL (ref 37–54)
EOSINOPHIL # BLD AUTO: 0.3 10*3/MM3 (ref 0–0.4)
EOSINOPHIL NFR BLD AUTO: 3.4 % (ref 0.3–6.2)
ERYTHROCYTE [DISTWIDTH] IN BLOOD BY AUTOMATED COUNT: 14.1 % (ref 12.3–15.4)
ERYTHROCYTE [SEDIMENTATION RATE] IN BLOOD: 35 MM/HR (ref 0–20)
HCT VFR BLD AUTO: 35.7 % (ref 37.5–51)
HGB BLD-MCNC: 11.8 G/DL (ref 13–17.7)
LYMPHOCYTES # BLD AUTO: 1.7 10*3/MM3 (ref 0.7–3.1)
LYMPHOCYTES NFR BLD AUTO: 21.3 % (ref 19.6–45.3)
MCH RBC QN AUTO: 30.6 PG (ref 26.6–33)
MCHC RBC AUTO-ENTMCNC: 33.2 G/DL (ref 31.5–35.7)
MCV RBC AUTO: 92.2 FL (ref 79–97)
MONOCYTES # BLD AUTO: 0.8 10*3/MM3 (ref 0.1–0.9)
MONOCYTES NFR BLD AUTO: 9.4 % (ref 5–12)
NEUTROPHILS NFR BLD AUTO: 5.4 10*3/MM3 (ref 1.7–7)
NEUTROPHILS NFR BLD AUTO: 65.3 % (ref 42.7–76)
NRBC BLD AUTO-RTO: 0.1 /100 WBC (ref 0–0.2)
PLATELET # BLD AUTO: 227 10*3/MM3 (ref 140–450)
PMV BLD AUTO: 7.4 FL (ref 6–12)
RBC # BLD AUTO: 3.87 10*6/MM3 (ref 4.14–5.8)
WBC NRBC COR # BLD: 8.2 10*3/MM3 (ref 3.4–10.8)

## 2022-02-28 PROCEDURE — 85025 COMPLETE CBC W/AUTO DIFF WBC: CPT

## 2022-02-28 PROCEDURE — 36415 COLL VENOUS BLD VENIPUNCTURE: CPT

## 2022-02-28 PROCEDURE — 85652 RBC SED RATE AUTOMATED: CPT

## 2022-02-28 PROCEDURE — 86140 C-REACTIVE PROTEIN: CPT

## 2022-03-01 ENCOUNTER — OFFICE VISIT (OUTPATIENT)
Dept: WOUND CARE | Facility: HOSPITAL | Age: 87
End: 2022-03-01

## 2022-03-01 ENCOUNTER — TREATMENT (OUTPATIENT)
Dept: CARDIAC REHAB | Facility: HOSPITAL | Age: 87
End: 2022-03-01

## 2022-03-01 DIAGNOSIS — J98.4 CHRONIC RESTRICTIVE LUNG DISEASE: Primary | ICD-10-CM

## 2022-03-01 PROCEDURE — G0238 OTH RESP PROC, INDIV: HCPCS

## 2022-03-01 PROCEDURE — G0237 THERAPEUTIC PROCD STRG ENDUR: HCPCS

## 2022-03-01 PROCEDURE — G0463 HOSPITAL OUTPT CLINIC VISIT: HCPCS

## 2022-03-03 ENCOUNTER — APPOINTMENT (OUTPATIENT)
Dept: CARDIAC REHAB | Facility: HOSPITAL | Age: 87
End: 2022-03-03

## 2022-03-08 ENCOUNTER — OFFICE VISIT (OUTPATIENT)
Dept: WOUND CARE | Facility: HOSPITAL | Age: 87
End: 2022-03-08

## 2022-03-08 ENCOUNTER — APPOINTMENT (OUTPATIENT)
Dept: CARDIAC REHAB | Facility: HOSPITAL | Age: 87
End: 2022-03-08

## 2022-03-08 PROCEDURE — G0463 HOSPITAL OUTPT CLINIC VISIT: HCPCS

## 2022-03-10 ENCOUNTER — TREATMENT (OUTPATIENT)
Dept: CARDIAC REHAB | Facility: HOSPITAL | Age: 87
End: 2022-03-10

## 2022-03-10 DIAGNOSIS — J98.4 CHRONIC RESTRICTIVE LUNG DISEASE: Primary | ICD-10-CM

## 2022-03-10 PROCEDURE — G0237 THERAPEUTIC PROCD STRG ENDUR: HCPCS

## 2022-03-10 PROCEDURE — G0238 OTH RESP PROC, INDIV: HCPCS

## 2022-03-15 ENCOUNTER — TREATMENT (OUTPATIENT)
Dept: CARDIAC REHAB | Facility: HOSPITAL | Age: 87
End: 2022-03-15

## 2022-03-15 DIAGNOSIS — J98.4 CHRONIC RESTRICTIVE LUNG DISEASE: Primary | ICD-10-CM

## 2022-03-15 PROCEDURE — G0237 THERAPEUTIC PROCD STRG ENDUR: HCPCS

## 2022-03-15 PROCEDURE — G0238 OTH RESP PROC, INDIV: HCPCS

## 2022-03-17 ENCOUNTER — TREATMENT (OUTPATIENT)
Dept: CARDIAC REHAB | Facility: HOSPITAL | Age: 87
End: 2022-03-17

## 2022-03-17 DIAGNOSIS — J98.4 CHRONIC RESTRICTIVE LUNG DISEASE: Primary | ICD-10-CM

## 2022-03-17 PROCEDURE — G0237 THERAPEUTIC PROCD STRG ENDUR: HCPCS

## 2022-03-17 PROCEDURE — G0238 OTH RESP PROC, INDIV: HCPCS

## 2022-03-22 ENCOUNTER — TREATMENT (OUTPATIENT)
Dept: CARDIAC REHAB | Facility: HOSPITAL | Age: 87
End: 2022-03-22

## 2022-03-22 ENCOUNTER — OFFICE VISIT (OUTPATIENT)
Dept: WOUND CARE | Facility: HOSPITAL | Age: 87
End: 2022-03-22

## 2022-03-22 DIAGNOSIS — J98.4 CHRONIC RESTRICTIVE LUNG DISEASE: Primary | ICD-10-CM

## 2022-03-22 PROCEDURE — G0237 THERAPEUTIC PROCD STRG ENDUR: HCPCS

## 2022-03-22 PROCEDURE — G0238 OTH RESP PROC, INDIV: HCPCS

## 2022-03-22 PROCEDURE — G0463 HOSPITAL OUTPT CLINIC VISIT: HCPCS

## 2022-03-24 ENCOUNTER — TREATMENT (OUTPATIENT)
Dept: CARDIAC REHAB | Facility: HOSPITAL | Age: 87
End: 2022-03-24

## 2022-03-24 DIAGNOSIS — J98.4 CHRONIC RESTRICTIVE LUNG DISEASE: Primary | ICD-10-CM

## 2022-03-24 PROCEDURE — G0238 OTH RESP PROC, INDIV: HCPCS

## 2022-03-24 PROCEDURE — G0237 THERAPEUTIC PROCD STRG ENDUR: HCPCS

## 2022-03-29 ENCOUNTER — OFFICE VISIT (OUTPATIENT)
Dept: WOUND CARE | Facility: HOSPITAL | Age: 87
End: 2022-03-29

## 2022-03-29 ENCOUNTER — TREATMENT (OUTPATIENT)
Dept: CARDIAC REHAB | Facility: HOSPITAL | Age: 87
End: 2022-03-29

## 2022-03-29 DIAGNOSIS — J98.4 CHRONIC RESTRICTIVE LUNG DISEASE: Primary | ICD-10-CM

## 2022-03-29 PROCEDURE — G0463 HOSPITAL OUTPT CLINIC VISIT: HCPCS

## 2022-03-29 PROCEDURE — G0238 OTH RESP PROC, INDIV: HCPCS

## 2022-03-29 PROCEDURE — G0237 THERAPEUTIC PROCD STRG ENDUR: HCPCS

## 2022-03-31 ENCOUNTER — TREATMENT (OUTPATIENT)
Dept: CARDIAC REHAB | Facility: HOSPITAL | Age: 87
End: 2022-03-31

## 2022-03-31 DIAGNOSIS — J98.4 CHRONIC RESTRICTIVE LUNG DISEASE: Primary | ICD-10-CM

## 2022-03-31 PROCEDURE — G0238 OTH RESP PROC, INDIV: HCPCS

## 2022-03-31 PROCEDURE — G0237 THERAPEUTIC PROCD STRG ENDUR: HCPCS

## 2022-04-01 ENCOUNTER — TRANSCRIBE ORDERS (OUTPATIENT)
Dept: ADMINISTRATIVE | Facility: HOSPITAL | Age: 87
End: 2022-04-01

## 2022-04-01 DIAGNOSIS — R06.02 SHORTNESS OF BREATH: Primary | ICD-10-CM

## 2022-04-05 ENCOUNTER — OFFICE VISIT (OUTPATIENT)
Dept: WOUND CARE | Facility: HOSPITAL | Age: 87
End: 2022-04-05

## 2022-04-05 ENCOUNTER — TREATMENT (OUTPATIENT)
Dept: CARDIAC REHAB | Facility: HOSPITAL | Age: 87
End: 2022-04-05

## 2022-04-05 DIAGNOSIS — J98.4 CHRONIC RESTRICTIVE LUNG DISEASE: Primary | ICD-10-CM

## 2022-04-05 PROCEDURE — G0463 HOSPITAL OUTPT CLINIC VISIT: HCPCS

## 2022-04-05 PROCEDURE — G0237 THERAPEUTIC PROCD STRG ENDUR: HCPCS

## 2022-04-05 PROCEDURE — G0238 OTH RESP PROC, INDIV: HCPCS

## 2022-04-07 ENCOUNTER — TREATMENT (OUTPATIENT)
Dept: CARDIAC REHAB | Facility: HOSPITAL | Age: 87
End: 2022-04-07

## 2022-04-07 DIAGNOSIS — J98.4 CHRONIC RESTRICTIVE LUNG DISEASE: Primary | ICD-10-CM

## 2022-04-07 PROCEDURE — G0238 OTH RESP PROC, INDIV: HCPCS

## 2022-04-07 PROCEDURE — G0237 THERAPEUTIC PROCD STRG ENDUR: HCPCS

## 2022-04-12 ENCOUNTER — TREATMENT (OUTPATIENT)
Dept: CARDIAC REHAB | Facility: HOSPITAL | Age: 87
End: 2022-04-12

## 2022-04-12 DIAGNOSIS — J98.4 CHRONIC RESTRICTIVE LUNG DISEASE: Primary | ICD-10-CM

## 2022-04-12 PROCEDURE — G0237 THERAPEUTIC PROCD STRG ENDUR: HCPCS

## 2022-04-12 PROCEDURE — G0238 OTH RESP PROC, INDIV: HCPCS

## 2022-04-14 ENCOUNTER — TREATMENT (OUTPATIENT)
Dept: CARDIAC REHAB | Facility: HOSPITAL | Age: 87
End: 2022-04-14

## 2022-04-14 DIAGNOSIS — J98.4 CHRONIC RESTRICTIVE LUNG DISEASE: Primary | ICD-10-CM

## 2022-04-14 PROCEDURE — G0237 THERAPEUTIC PROCD STRG ENDUR: HCPCS

## 2022-04-14 PROCEDURE — G0238 OTH RESP PROC, INDIV: HCPCS

## 2022-04-19 ENCOUNTER — TREATMENT (OUTPATIENT)
Dept: CARDIAC REHAB | Facility: HOSPITAL | Age: 87
End: 2022-04-19

## 2022-04-19 DIAGNOSIS — J98.4 CHRONIC RESTRICTIVE LUNG DISEASE: Primary | ICD-10-CM

## 2022-04-19 PROCEDURE — G0237 THERAPEUTIC PROCD STRG ENDUR: HCPCS

## 2022-04-19 PROCEDURE — G0238 OTH RESP PROC, INDIV: HCPCS

## 2022-04-21 ENCOUNTER — TREATMENT (OUTPATIENT)
Dept: CARDIAC REHAB | Facility: HOSPITAL | Age: 87
End: 2022-04-21

## 2022-04-21 DIAGNOSIS — J98.4 CHRONIC RESTRICTIVE LUNG DISEASE: Primary | ICD-10-CM

## 2022-04-21 PROCEDURE — G0238 OTH RESP PROC, INDIV: HCPCS

## 2022-04-21 PROCEDURE — G0237 THERAPEUTIC PROCD STRG ENDUR: HCPCS

## 2022-04-26 ENCOUNTER — APPOINTMENT (OUTPATIENT)
Dept: CARDIAC REHAB | Facility: HOSPITAL | Age: 87
End: 2022-04-26

## 2022-04-28 ENCOUNTER — APPOINTMENT (OUTPATIENT)
Dept: CARDIAC REHAB | Facility: HOSPITAL | Age: 87
End: 2022-04-28

## 2022-05-03 ENCOUNTER — APPOINTMENT (OUTPATIENT)
Dept: CARDIAC REHAB | Facility: HOSPITAL | Age: 87
End: 2022-05-03

## 2022-05-05 ENCOUNTER — APPOINTMENT (OUTPATIENT)
Dept: CARDIAC REHAB | Facility: HOSPITAL | Age: 87
End: 2022-05-05

## 2022-07-06 ENCOUNTER — LAB (OUTPATIENT)
Dept: LAB | Facility: HOSPITAL | Age: 87
End: 2022-07-06

## 2022-07-06 DIAGNOSIS — R06.02 SHORTNESS OF BREATH: ICD-10-CM

## 2022-07-06 LAB — SARS-COV-2 ORF1AB RESP QL NAA+PROBE: NOT DETECTED

## 2022-07-06 PROCEDURE — U0005 INFEC AGEN DETEC AMPLI PROBE: HCPCS

## 2022-07-06 PROCEDURE — U0004 COV-19 TEST NON-CDC HGH THRU: HCPCS

## 2022-07-06 PROCEDURE — C9803 HOPD COVID-19 SPEC COLLECT: HCPCS

## 2022-07-08 ENCOUNTER — HOSPITAL ENCOUNTER (OUTPATIENT)
Dept: RESPIRATORY THERAPY | Facility: HOSPITAL | Age: 87
Discharge: HOME | End: 2022-07-08
Admitting: INTERNAL MEDICINE

## 2022-07-08 DIAGNOSIS — R06.02 SHORTNESS OF BREATH: ICD-10-CM

## 2022-07-08 PROCEDURE — 94060 EVALUATION OF WHEEZING: CPT

## 2022-07-08 PROCEDURE — 94727 GAS DIL/WSHOT DETER LNG VOL: CPT

## 2022-07-08 PROCEDURE — 63710000001 ALBUTEROL SULFATE HFA 108 (90 BASE) MCG/ACT AEROSOL SOLUTION 6.7 G INHALER: Performed by: INTERNAL MEDICINE

## 2022-07-08 PROCEDURE — 94729 DIFFUSING CAPACITY: CPT

## 2022-07-08 PROCEDURE — A9270 NON-COVERED ITEM OR SERVICE: HCPCS | Performed by: INTERNAL MEDICINE

## 2022-07-08 RX ORDER — ALBUTEROL SULFATE 90 UG/1
2 AEROSOL, METERED RESPIRATORY (INHALATION) ONCE
Status: COMPLETED | OUTPATIENT
Start: 2022-07-08 | End: 2022-07-08

## 2022-07-08 RX ADMIN — ALBUTEROL SULFATE 2 PUFF: 108 INHALANT RESPIRATORY (INHALATION) at 11:24

## 2022-07-11 ENCOUNTER — APPOINTMENT (OUTPATIENT)
Dept: LAB | Facility: HOSPITAL | Age: 87
End: 2022-07-11

## 2022-07-13 ENCOUNTER — APPOINTMENT (OUTPATIENT)
Dept: RESPIRATORY THERAPY | Facility: HOSPITAL | Age: 87
End: 2022-07-13

## 2022-10-18 RX ORDER — LEVOTHYROXINE SODIUM 88 UG/1
TABLET ORAL
Qty: 90 TABLET | Refills: 0 | Status: SHIPPED | OUTPATIENT
Start: 2022-10-18 | End: 2023-01-16

## 2022-12-29 RX ORDER — LISINOPRIL 5 MG/1
TABLET ORAL
Qty: 90 TABLET | Refills: 3 | Status: SHIPPED | OUTPATIENT
Start: 2022-12-29

## 2023-01-01 NOTE — PROGRESS NOTES
Physical Therapy Daily Progress Note    VISIT#: 5    Subjective   Pt reports: doing a little better but has had a few episodes since the last treatment.  Still feels some dizziness getting up from seated position or looking up. It lasts only for a few secs.       Objective   DHP: R pos for up beating nystagmus few secs delay, lasting for about 5'  L not tested    Rx: R Epley maneuver f/b post treatment instructions and precautions suggested to sleep in the recliner for 2 nights this time.  Pt demo understanding of home instructions. silvana treatment well.    Patient Education:    Assessment & Plan     Assessment  Assessment details: Has responded well to treatment, but mild symptoms still present today. R DHP  still pos.  Good silvana to today's treatment. Demo understanding of home instructions.    Plan  Plan details: Will be seen next week, reassess and treat as indicated.                      Timed:         Manual Therapy:         mins  79814;     Therapeutic Exercise:         mins  81800;     Neuromuscular Katherin:   5    mins  36343;    Therapeutic Activity:          mins  37783;     Gait Training:           mins  63967;     Ultrasound:          mins  31890;    Ionto                                   mins   91149  Self Care                           mins   26976  Canal repositioning       15    mins    63831    Un-Timed:  Electrical Stimulation:         mins  28842 ( );  Traction          mins 12956  Low Eval          Mins  29623  Mod Eval          Mins  34908  High Eval                            Mins  19490  Re-Eval                               mins  68551    Timed Treatment:  20    mins   Total Treatment:    20   mins    Baljit Joseph, PT, CLT  Physical Therapist   Yes

## 2023-01-16 RX ORDER — LEVOTHYROXINE SODIUM 88 UG/1
TABLET ORAL
Qty: 90 TABLET | Refills: 1 | Status: SHIPPED | OUTPATIENT
Start: 2023-01-16

## 2023-03-29 RX ORDER — ATORVASTATIN CALCIUM 20 MG/1
TABLET, FILM COATED ORAL
Qty: 90 TABLET | Refills: 3 | Status: SHIPPED | OUTPATIENT
Start: 2023-03-29

## 2023-08-01 ENCOUNTER — OFFICE VISIT (OUTPATIENT)
Dept: WOUND CARE | Facility: HOSPITAL | Age: 88
End: 2023-08-01
Payer: MEDICARE

## 2023-08-01 PROCEDURE — G0463 HOSPITAL OUTPT CLINIC VISIT: HCPCS

## 2023-08-29 ENCOUNTER — TRANSCRIBE ORDERS (OUTPATIENT)
Dept: ADMINISTRATIVE | Facility: HOSPITAL | Age: 88
End: 2023-08-29
Payer: MEDICARE

## 2023-08-29 DIAGNOSIS — J84.112 IDIOPATHIC PULMONARY FIBROSIS: Primary | ICD-10-CM

## 2023-10-12 ENCOUNTER — HOSPITAL ENCOUNTER (OUTPATIENT)
Dept: RESPIRATORY THERAPY | Facility: HOSPITAL | Age: 88
Discharge: HOME | End: 2023-10-12
Payer: MEDICARE

## 2023-10-12 VITALS — RESPIRATION RATE: 16 BRPM | OXYGEN SATURATION: 97 % | HEART RATE: 112 BPM

## 2023-10-12 DIAGNOSIS — J84.112 IDIOPATHIC PULMONARY FIBROSIS: ICD-10-CM

## 2023-10-12 PROCEDURE — A9270 NON-COVERED ITEM OR SERVICE: HCPCS | Performed by: INTERNAL MEDICINE

## 2023-10-12 PROCEDURE — 94729 DIFFUSING CAPACITY: CPT

## 2023-10-12 PROCEDURE — 94060 EVALUATION OF WHEEZING: CPT

## 2023-10-12 PROCEDURE — 63710000001 ALBUTEROL SULFATE HFA 108 (90 BASE) MCG/ACT AEROSOL SOLUTION 6.7 G INHALER: Performed by: INTERNAL MEDICINE

## 2023-10-12 PROCEDURE — 94760 N-INVAS EAR/PLS OXIMETRY 1: CPT

## 2023-10-12 PROCEDURE — 94799 UNLISTED PULMONARY SVC/PX: CPT

## 2023-10-12 PROCEDURE — 94726 PLETHYSMOGRAPHY LUNG VOLUMES: CPT

## 2023-10-12 PROCEDURE — 94664 DEMO&/EVAL PT USE INHALER: CPT

## 2023-10-12 RX ORDER — ALBUTEROL SULFATE 90 UG/1
2 AEROSOL, METERED RESPIRATORY (INHALATION) ONCE
Status: COMPLETED | OUTPATIENT
Start: 2023-10-12 | End: 2023-10-12

## 2023-10-12 RX ADMIN — ALBUTEROL SULFATE 2 PUFF: 108 AEROSOL, METERED RESPIRATORY (INHALATION) at 14:32

## 2023-11-24 ENCOUNTER — TRANSCRIBE ORDERS (OUTPATIENT)
Dept: ADMINISTRATIVE | Facility: HOSPITAL | Age: 88
End: 2023-11-24
Payer: MEDICARE

## 2023-11-24 DIAGNOSIS — I27.20 PULMONARY HYPERTENSION: Primary | ICD-10-CM

## 2023-11-28 ENCOUNTER — TRANSCRIBE ORDERS (OUTPATIENT)
Dept: LAB | Facility: HOSPITAL | Age: 88
End: 2023-11-28
Payer: MEDICARE

## 2023-11-28 ENCOUNTER — HOSPITAL ENCOUNTER (OUTPATIENT)
Dept: RESPIRATORY THERAPY | Facility: HOSPITAL | Age: 88
Discharge: HOME | End: 2023-11-28
Admitting: INTERNAL MEDICINE
Payer: MEDICARE

## 2023-11-28 DIAGNOSIS — J44.9 CHRONIC OBSTRUCTIVE PULMONARY DISEASE, UNSPECIFIED COPD TYPE: ICD-10-CM

## 2023-11-28 DIAGNOSIS — J44.9 CHRONIC OBSTRUCTIVE PULMONARY DISEASE, UNSPECIFIED COPD TYPE: Primary | ICD-10-CM

## 2023-11-28 LAB
ARTERIAL PATENCY WRIST A: POSITIVE
ATMOSPHERIC PRESS: ABNORMAL MM[HG]
BASE EXCESS BLDA CALC-SCNC: 5.2 MMOL/L (ref 0–3)
BDY SITE: ABNORMAL
CO2 BLDA-SCNC: 32.1 MMOL/L (ref 22–29)
HCO3 BLDA-SCNC: 30.6 MMOL/L (ref 21–28)
HEMODILUTION: NO
MODALITY: ABNORMAL
PCO2 BLDA: 46.4 MM HG (ref 35–48)
PH BLDA: 7.43 PH UNITS (ref 7.35–7.45)
PO2 BLDA: 48.8 MM HG (ref 83–108)
SAO2 % BLDCOA: 84.6 % (ref 94–98)

## 2023-11-28 PROCEDURE — 82803 BLOOD GASES ANY COMBINATION: CPT

## 2023-11-28 PROCEDURE — 36600 WITHDRAWAL OF ARTERIAL BLOOD: CPT

## 2023-12-01 ENCOUNTER — APPOINTMENT (OUTPATIENT)
Dept: GENERAL RADIOLOGY | Facility: HOSPITAL | Age: 88
DRG: 197 | End: 2023-12-01
Payer: MEDICARE

## 2023-12-01 ENCOUNTER — HOSPITAL ENCOUNTER (INPATIENT)
Facility: HOSPITAL | Age: 88
LOS: 1 days | Discharge: HOME | DRG: 197 | End: 2023-12-04
Attending: EMERGENCY MEDICINE | Admitting: FAMILY MEDICINE
Payer: MEDICARE

## 2023-12-01 DIAGNOSIS — R55 NEAR SYNCOPE: Primary | ICD-10-CM

## 2023-12-01 DIAGNOSIS — I95.9 HYPOTENSION, UNSPECIFIED HYPOTENSION TYPE: ICD-10-CM

## 2023-12-01 DIAGNOSIS — J84.10 PULMONARY FIBROSIS: ICD-10-CM

## 2023-12-01 PROBLEM — J84.112 IDIOPATHIC PULMONARY FIBROSIS: Chronic | Status: ACTIVE | Noted: 2020-04-27

## 2023-12-01 PROBLEM — R79.9 ABNORMAL FINDING OF BLOOD CHEMISTRY, UNSPECIFIED: Status: RESOLVED | Noted: 2019-12-30 | Resolved: 2023-12-01

## 2023-12-01 PROBLEM — Z12.5 ENCOUNTER FOR SCREENING FOR MALIGNANT NEOPLASM OF PROSTATE: Status: RESOLVED | Noted: 2019-12-30 | Resolved: 2023-12-01

## 2023-12-01 PROBLEM — M19.90 DEGENERATIVE JOINT DISEASE: Chronic | Status: ACTIVE | Noted: 2019-12-27

## 2023-12-01 PROBLEM — M54.81 OCCIPITAL NEURALGIA: Status: RESOLVED | Noted: 2018-06-08 | Resolved: 2023-12-01

## 2023-12-01 PROBLEM — Z00.00 PREVENTATIVE HEALTH CARE: Status: RESOLVED | Noted: 2019-12-30 | Resolved: 2023-12-01

## 2023-12-01 PROBLEM — M77.40 METATARSALGIA: Status: RESOLVED | Noted: 2017-08-23 | Resolved: 2023-12-01

## 2023-12-01 PROBLEM — G47.33 OSA (OBSTRUCTIVE SLEEP APNEA): Chronic | Status: ACTIVE | Noted: 2023-12-01

## 2023-12-01 PROBLEM — Z99.81 DEPENDENCE ON SUPPLEMENTAL OXYGEN: Chronic | Status: ACTIVE | Noted: 2023-12-01

## 2023-12-01 PROBLEM — Z00.00 MEDICARE ANNUAL WELLNESS VISIT, SUBSEQUENT: Status: RESOLVED | Noted: 2019-12-30 | Resolved: 2023-12-01

## 2023-12-01 LAB
ALBUMIN SERPL-MCNC: 3.2 G/DL (ref 3.5–5.2)
ALBUMIN/GLOB SERPL: 1 G/DL
ALP SERPL-CCNC: 59 U/L (ref 39–117)
ALT SERPL W P-5'-P-CCNC: 12 U/L (ref 1–41)
ANION GAP SERPL CALCULATED.3IONS-SCNC: 8 MMOL/L (ref 5–15)
AST SERPL-CCNC: 16 U/L (ref 1–40)
BASOPHILS # BLD AUTO: 0.1 10*3/MM3 (ref 0–0.2)
BASOPHILS NFR BLD AUTO: 0.6 % (ref 0–1.5)
BILIRUB SERPL-MCNC: <0.2 MG/DL (ref 0–1.2)
BUN SERPL-MCNC: 22 MG/DL (ref 8–23)
BUN/CREAT SERPL: 19.8 (ref 7–25)
CALCIUM SPEC-SCNC: 9.3 MG/DL (ref 8.6–10.5)
CHLORIDE SERPL-SCNC: 96 MMOL/L (ref 98–107)
CO2 SERPL-SCNC: 30 MMOL/L (ref 22–29)
CREAT SERPL-MCNC: 1.11 MG/DL (ref 0.76–1.27)
DEPRECATED RDW RBC AUTO: 44.6 FL (ref 37–54)
EGFRCR SERPLBLD CKD-EPI 2021: 63.9 ML/MIN/1.73
EOSINOPHIL # BLD AUTO: 0.2 10*3/MM3 (ref 0–0.4)
EOSINOPHIL NFR BLD AUTO: 2.9 % (ref 0.3–6.2)
ERYTHROCYTE [DISTWIDTH] IN BLOOD BY AUTOMATED COUNT: 13.8 % (ref 12.3–15.4)
GEN 5 2HR TROPONIN T REFLEX: 12 NG/L
GLOBULIN UR ELPH-MCNC: 3.3 GM/DL
GLUCOSE SERPL-MCNC: 138 MG/DL (ref 65–99)
HCT VFR BLD AUTO: 39.2 % (ref 37.5–51)
HGB BLD-MCNC: 12.6 G/DL (ref 13–17.7)
LYMPHOCYTES # BLD AUTO: 1.3 10*3/MM3 (ref 0.7–3.1)
LYMPHOCYTES NFR BLD AUTO: 15 % (ref 19.6–45.3)
MCH RBC QN AUTO: 29.9 PG (ref 26.6–33)
MCHC RBC AUTO-ENTMCNC: 32.1 G/DL (ref 31.5–35.7)
MCV RBC AUTO: 93.1 FL (ref 79–97)
MONOCYTES # BLD AUTO: 0.8 10*3/MM3 (ref 0.1–0.9)
MONOCYTES NFR BLD AUTO: 9.7 % (ref 5–12)
NEUTROPHILS NFR BLD AUTO: 6 10*3/MM3 (ref 1.7–7)
NEUTROPHILS NFR BLD AUTO: 71.8 % (ref 42.7–76)
NRBC BLD AUTO-RTO: 0 /100 WBC (ref 0–0.2)
NT-PROBNP SERPL-MCNC: 155.2 PG/ML (ref 0–1800)
PLATELET # BLD AUTO: 247 10*3/MM3 (ref 140–450)
PMV BLD AUTO: 7.4 FL (ref 6–12)
POTASSIUM SERPL-SCNC: 4.3 MMOL/L (ref 3.5–5.2)
PROT SERPL-MCNC: 6.5 G/DL (ref 6–8.5)
RBC # BLD AUTO: 4.21 10*6/MM3 (ref 4.14–5.8)
SODIUM SERPL-SCNC: 134 MMOL/L (ref 136–145)
TROPONIN T DELTA: -4 NG/L
TROPONIN T SERPL HS-MCNC: 16 NG/L
WBC NRBC COR # BLD AUTO: 8.4 10*3/MM3 (ref 3.4–10.8)

## 2023-12-01 PROCEDURE — 84484 ASSAY OF TROPONIN QUANT: CPT | Performed by: EMERGENCY MEDICINE

## 2023-12-01 PROCEDURE — 25010000002 ENOXAPARIN PER 10 MG: Performed by: STUDENT IN AN ORGANIZED HEALTH CARE EDUCATION/TRAINING PROGRAM

## 2023-12-01 PROCEDURE — 71045 X-RAY EXAM CHEST 1 VIEW: CPT

## 2023-12-01 PROCEDURE — G0378 HOSPITAL OBSERVATION PER HR: HCPCS

## 2023-12-01 PROCEDURE — 25810000003 LACTATED RINGERS SOLUTION: Performed by: EMERGENCY MEDICINE

## 2023-12-01 PROCEDURE — 85025 COMPLETE CBC W/AUTO DIFF WBC: CPT | Performed by: EMERGENCY MEDICINE

## 2023-12-01 PROCEDURE — 80053 COMPREHEN METABOLIC PANEL: CPT | Performed by: EMERGENCY MEDICINE

## 2023-12-01 PROCEDURE — 84443 ASSAY THYROID STIM HORMONE: CPT | Performed by: HOSPITALIST

## 2023-12-01 PROCEDURE — 83880 ASSAY OF NATRIURETIC PEPTIDE: CPT | Performed by: EMERGENCY MEDICINE

## 2023-12-01 PROCEDURE — 99285 EMERGENCY DEPT VISIT HI MDM: CPT

## 2023-12-01 PROCEDURE — 93005 ELECTROCARDIOGRAM TRACING: CPT

## 2023-12-01 PROCEDURE — 36415 COLL VENOUS BLD VENIPUNCTURE: CPT

## 2023-12-01 RX ORDER — ASPIRIN 300 MG/1
300 SUPPOSITORY RECTAL DAILY
Status: DISCONTINUED | OUTPATIENT
Start: 2023-12-02 | End: 2023-12-04 | Stop reason: HOSPADM

## 2023-12-01 RX ORDER — ZINC SULFATE 50(220)MG
220 CAPSULE ORAL DAILY
COMMUNITY

## 2023-12-01 RX ORDER — AMOXICILLIN 250 MG
2 CAPSULE ORAL 2 TIMES DAILY
Status: DISCONTINUED | OUTPATIENT
Start: 2023-12-01 | End: 2023-12-04 | Stop reason: HOSPADM

## 2023-12-01 RX ORDER — MULTIVIT WITH MINERALS/LUTEIN
1 TABLET ORAL DAILY
COMMUNITY

## 2023-12-01 RX ORDER — NITROGLYCERIN 0.4 MG/1
0.4 TABLET SUBLINGUAL
Status: DISCONTINUED | OUTPATIENT
Start: 2023-12-01 | End: 2023-12-04 | Stop reason: HOSPADM

## 2023-12-01 RX ORDER — ENOXAPARIN SODIUM 100 MG/ML
40 INJECTION SUBCUTANEOUS DAILY
Status: DISCONTINUED | OUTPATIENT
Start: 2023-12-01 | End: 2023-12-04 | Stop reason: HOSPADM

## 2023-12-01 RX ORDER — ASPIRIN 325 MG
325 TABLET ORAL DAILY
Status: DISCONTINUED | OUTPATIENT
Start: 2023-12-02 | End: 2023-12-04 | Stop reason: HOSPADM

## 2023-12-01 RX ADMIN — SODIUM CHLORIDE, POTASSIUM CHLORIDE, SODIUM LACTATE AND CALCIUM CHLORIDE 500 ML: 600; 310; 30; 20 INJECTION, SOLUTION INTRAVENOUS at 20:14

## 2023-12-01 RX ADMIN — ENOXAPARIN SODIUM 40 MG: 100 INJECTION SUBCUTANEOUS at 23:27

## 2023-12-01 RX ADMIN — SODIUM CHLORIDE, POTASSIUM CHLORIDE, SODIUM LACTATE AND CALCIUM CHLORIDE 500 ML: 600; 310; 30; 20 INJECTION, SOLUTION INTRAVENOUS at 20:38

## 2023-12-02 ENCOUNTER — APPOINTMENT (OUTPATIENT)
Dept: CARDIOLOGY | Facility: HOSPITAL | Age: 88
DRG: 197 | End: 2023-12-02
Payer: MEDICARE

## 2023-12-02 LAB
BH CV LOWER VASCULAR LEFT COMMON FEMORAL AUGMENT: NORMAL
BH CV LOWER VASCULAR LEFT COMMON FEMORAL COMPETENT: NORMAL
BH CV LOWER VASCULAR LEFT COMMON FEMORAL COMPRESS: NORMAL
BH CV LOWER VASCULAR LEFT COMMON FEMORAL PHASIC: NORMAL
BH CV LOWER VASCULAR LEFT COMMON FEMORAL SPONT: NORMAL
BH CV LOWER VASCULAR LEFT DISTAL FEMORAL COMPRESS: NORMAL
BH CV LOWER VASCULAR LEFT GASTRONEMIUS COMPRESS: NORMAL
BH CV LOWER VASCULAR LEFT GREATER SAPH AK COMPRESS: NORMAL
BH CV LOWER VASCULAR LEFT GREATER SAPH BK COMPRESS: NORMAL
BH CV LOWER VASCULAR LEFT LESSER SAPH COMPRESS: NORMAL
BH CV LOWER VASCULAR LEFT MID FEMORAL AUGMENT: NORMAL
BH CV LOWER VASCULAR LEFT MID FEMORAL COMPETENT: NORMAL
BH CV LOWER VASCULAR LEFT MID FEMORAL COMPRESS: NORMAL
BH CV LOWER VASCULAR LEFT MID FEMORAL PHASIC: NORMAL
BH CV LOWER VASCULAR LEFT MID FEMORAL SPONT: NORMAL
BH CV LOWER VASCULAR LEFT PERONEAL COMPRESS: NORMAL
BH CV LOWER VASCULAR LEFT POPLITEAL AUGMENT: NORMAL
BH CV LOWER VASCULAR LEFT POPLITEAL COMPETENT: NORMAL
BH CV LOWER VASCULAR LEFT POPLITEAL COMPRESS: NORMAL
BH CV LOWER VASCULAR LEFT POPLITEAL PHASIC: NORMAL
BH CV LOWER VASCULAR LEFT POPLITEAL SPONT: NORMAL
BH CV LOWER VASCULAR LEFT POSTERIOR TIBIAL COMPRESS: NORMAL
BH CV LOWER VASCULAR LEFT PROXIMAL FEMORAL COMPRESS: NORMAL
BH CV LOWER VASCULAR LEFT SAPHENOFEMORAL JUNCTION COMPRESS: NORMAL
BH CV LOWER VASCULAR RIGHT COMMON FEMORAL AUGMENT: NORMAL
BH CV LOWER VASCULAR RIGHT COMMON FEMORAL COMPETENT: NORMAL
BH CV LOWER VASCULAR RIGHT COMMON FEMORAL COMPRESS: NORMAL
BH CV LOWER VASCULAR RIGHT COMMON FEMORAL PHASIC: NORMAL
BH CV LOWER VASCULAR RIGHT COMMON FEMORAL SPONT: NORMAL
BH CV XLRA MEAS LEFT DIST CCA EDV: -16.5 CM/SEC
BH CV XLRA MEAS LEFT DIST CCA PSV: -71.3 CM/SEC
BH CV XLRA MEAS LEFT DIST ICA EDV: -16.3 CM/SEC
BH CV XLRA MEAS LEFT DIST ICA PSV: -62.8 CM/SEC
BH CV XLRA MEAS LEFT ICA/CCA RATIO: 0.6
BH CV XLRA MEAS LEFT PROX CCA EDV: -20.9 CM/SEC
BH CV XLRA MEAS LEFT PROX CCA PSV: -123 CM/SEC
BH CV XLRA MEAS LEFT PROX ECA PSV: -69.8 CM/SEC
BH CV XLRA MEAS LEFT PROX ICA EDV: -22.7 CM/SEC
BH CV XLRA MEAS LEFT PROX ICA PSV: -71.3 CM/SEC
BH CV XLRA MEAS LEFT PROX SCLA PSV: 176 CM/SEC
BH CV XLRA MEAS LEFT VERTEBRAL A PSV: -50.2 CM/SEC
BH CV XLRA MEAS RIGHT DIST CCA EDV: 13 CM/SEC
BH CV XLRA MEAS RIGHT DIST CCA PSV: 71.4 CM/SEC
BH CV XLRA MEAS RIGHT DIST ICA EDV: -24.2 CM/SEC
BH CV XLRA MEAS RIGHT DIST ICA PSV: -88.8 CM/SEC
BH CV XLRA MEAS RIGHT ICA/CCA RATIO: 1.2
BH CV XLRA MEAS RIGHT PROX CCA EDV: 8.7 CM/SEC
BH CV XLRA MEAS RIGHT PROX CCA PSV: 64.6 CM/SEC
BH CV XLRA MEAS RIGHT PROX ECA PSV: -109 CM/SEC
BH CV XLRA MEAS RIGHT PROX ICA EDV: -16.8 CM/SEC
BH CV XLRA MEAS RIGHT PROX ICA PSV: -61.5 CM/SEC
BH CV XLRA MEAS RIGHT PROX SCLA PSV: 153 CM/SEC
BH CV XLRA MEAS RIGHT VERTEBRAL A PSV: -60.9 CM/SEC
QT INTERVAL: 329 MS
QT INTERVAL: 366 MS
QTC INTERVAL: 446 MS
QTC INTERVAL: 451 MS

## 2023-12-02 PROCEDURE — G0378 HOSPITAL OBSERVATION PER HR: HCPCS

## 2023-12-02 PROCEDURE — 93010 ELECTROCARDIOGRAM REPORT: CPT | Performed by: INTERNAL MEDICINE

## 2023-12-02 PROCEDURE — 93880 EXTRACRANIAL BILAT STUDY: CPT

## 2023-12-02 PROCEDURE — 93005 ELECTROCARDIOGRAM TRACING: CPT | Performed by: STUDENT IN AN ORGANIZED HEALTH CARE EDUCATION/TRAINING PROGRAM

## 2023-12-02 PROCEDURE — 93971 EXTREMITY STUDY: CPT

## 2023-12-02 PROCEDURE — 25010000002 ENOXAPARIN PER 10 MG: Performed by: STUDENT IN AN ORGANIZED HEALTH CARE EDUCATION/TRAINING PROGRAM

## 2023-12-02 RX ORDER — ZINC SULFATE 50(220)MG
220 CAPSULE ORAL DAILY
Status: DISCONTINUED | OUTPATIENT
Start: 2023-12-02 | End: 2023-12-04 | Stop reason: HOSPADM

## 2023-12-02 RX ORDER — PIRFENIDONE 267 MG/1
3 TABLET, FILM COATED ORAL 3 TIMES DAILY
Status: DISCONTINUED | OUTPATIENT
Start: 2023-12-02 | End: 2023-12-04 | Stop reason: HOSPADM

## 2023-12-02 RX ORDER — ASCORBIC ACID 500 MG
250 TABLET ORAL DAILY
Status: DISCONTINUED | OUTPATIENT
Start: 2023-12-02 | End: 2023-12-04 | Stop reason: HOSPADM

## 2023-12-02 RX ORDER — ATORVASTATIN CALCIUM 20 MG/1
20 TABLET, FILM COATED ORAL NIGHTLY
Status: DISCONTINUED | OUTPATIENT
Start: 2023-12-02 | End: 2023-12-04 | Stop reason: HOSPADM

## 2023-12-02 RX ORDER — MULTIPLE VITAMINS W/ MINERALS TAB 9MG-400MCG
1 TAB ORAL DAILY
Status: DISCONTINUED | OUTPATIENT
Start: 2023-12-02 | End: 2023-12-04 | Stop reason: HOSPADM

## 2023-12-02 RX ORDER — LEVOTHYROXINE SODIUM 88 UG/1
88 TABLET ORAL
Status: DISCONTINUED | OUTPATIENT
Start: 2023-12-02 | End: 2023-12-04 | Stop reason: HOSPADM

## 2023-12-02 RX ADMIN — ENOXAPARIN SODIUM 40 MG: 100 INJECTION SUBCUTANEOUS at 16:17

## 2023-12-02 RX ADMIN — ATORVASTATIN CALCIUM 20 MG: 20 TABLET, FILM COATED ORAL at 21:48

## 2023-12-02 RX ADMIN — Medication 1 TABLET: at 12:25

## 2023-12-02 RX ADMIN — LEVOTHYROXINE SODIUM 88 MCG: 0.09 TABLET ORAL at 12:25

## 2023-12-02 RX ADMIN — OXYCODONE HYDROCHLORIDE AND ACETAMINOPHEN 250 MG: 500 TABLET ORAL at 12:25

## 2023-12-02 RX ADMIN — PIRFENIDONE 801 MG: 267 TABLET, FILM COATED ORAL at 16:17

## 2023-12-02 RX ADMIN — DOCUSATE SODIUM 50 MG AND SENNOSIDES 8.6 MG 2 TABLET: 8.6; 5 TABLET, FILM COATED ORAL at 21:48

## 2023-12-02 RX ADMIN — Medication 220 MG: at 12:26

## 2023-12-02 RX ADMIN — PIRFENIDONE 801 MG: 267 TABLET, FILM COATED ORAL at 21:49

## 2023-12-02 NOTE — PROGRESS NOTES
[facility] Medicine Services   Daily Progress Note    Patient Name: Willis Lechuga  : 1935  MRN: [id number]  Primary Care Physician:  Blake Giraldo MD  Date of admission: 2023    Subjective      Admitted with near syncope in the setting of end-stage pulmonary fibrosis    Doing okay since admission.  Transferred to the floor from the ER.  He remains on 3 L nasal cannula.  Chest x-ray was okay, and review of his echocardiogram from  was fairly unremarkable.  He does have a history of sleep apnea.  He takes a low-dose ACE inhibitor for blood pressure management.  He has been taking Esbriet for quite some time now at therapeutic dosing.  No recent medication changes at all.  He is wondering if he may have been a bit dehydrated however.  Wife and daughter are at the bedside.  His daughter also offers that the patient complained of left lower extremity swelling and calf pain a day or 2 ago.  This is not currently problematic, but he endorses this history.  He is fairly immobile, as he is very intolerant to activity giving his advanced lung disease.  He has been more short of breath just trying to go to the bathroom.  No history of blood clots, but certainly at higher risk, all things considered.  Carotid ultrasound and echocardiogram are pending.  Blood pressures were a bit low on admission and have improved with IV fluid hydration.  His home medication reconciliation was completed.  He does have his medication here with him.  Discussed the case with the bedside nursing staff. No other acute concerns.    Objective      Vitals:   Temp:  [97.8 °F (36.6 °C)-98.4 °F (36.9 °C)] 98.3 °F (36.8 °C)  Heart Rate:  [] 89  Resp:  [16-30] 30  BP: ()/(42-59) 108/58  Flow (L/min):  [3] 3    Physical Exam   GEN: WDWN elderly gentleman, no acute distress  HEENT: NCAT, PERRLA, moist mucous membranes  NECK: Supple, midline trachea  CARD: RRR, no appreciable M/R/G, no peripheral edema--no calf  tenderness on either leg today  PULM: Fairly clear to auscultation bilaterally, diminished at the bases, non-distressed  ABD: soft, NTND, normoactive bowel sounds throughout  NEURO: Grossly intact, non-focal exam  PSYCH: Pleasant    Result Review    I have personally reviewed the results from the time of this admission to 12/2/2023 07:25 EST and agree with these findings:  [x]  Laboratory  [x]  Microbiology  [x]  Radiology  [x]  EKG/Telemetry   [x]  Cardiology/Vascular   []  Pathology  [x]  Old records  []  Other:    Assessment & Plan      aspirin, 325 mg, Oral, Daily   Or  aspirin, 300 mg, Rectal, Daily  enoxaparin, 40 mg, Subcutaneous, Daily  sennosides-docusate, 2 tablet, Oral, BID             Active Hospital Problems:  Active Hospital Problems    Diagnosis     **Near syncope     WILMAR (obstructive sleep apnea)     Dependence on supplemental oxygen     Idiopathic pulmonary fibrosis     Degenerative joint disease     Hypothyroidism     Hyperlipidemia     Hypertension     Vitamin D deficiency      Plan:   Presyncope   -EKG reviewed  -CT reviewed  -Orthostatics  -Carotid US and TTE pending completion  -Given reported left lower extremity swelling and calf pain recently, will get a lower extremity Doppler as well, as he is indeed at increased risk for DVT and thus, pulmonary embolus  -I suspect this was due to low blood pressure from a combination of less than optimal fluid intake and routine blood pressure medication use, but certainly at risk for right-sided heart failure due to his chronic lung disease and potentially clot as described above     Pulmonary fibrosis, end-stage  WILMAR  - Follows with Dr. Vincent  - Consider pulmonary consult as appropriate     Chronic conditions: Hypertension, hypothyroid, hyperlipidemia,  - Continue home medications as appropriate, holding his lisinopril for now  - Monitor routine labs and vital signs, blood pressures look good now.  - Use home supply of Esbriet      Nutrition:   Diet:  Cardiac Diets; Healthy Heart (2-3 Na+); Texture: Regular Texture (IDDSI 7); Fluid Consistency: Thin (IDDSI 0)     DVT prophylaxis:  Medical DVT prophylaxis orders are present.    CODE STATUS:    Medical Intervention Limits: NO intubation (DNI)  Code Status (Patient has no pulse and is not breathing): No CPR (Do Not Attempt to Resuscitate)  Medical Interventions (Patient has pulse or is breathing): Limited Support    Disposition:  I expect patient to be discharged home within 24 to 48 hours once his work-up is complete.    Electronically signed by Lucho Nassar MD, 12/02/23, 07:25 EST.  [facility] Hospitalist Team

## 2023-12-02 NOTE — PLAN OF CARE
Problem: Adult Inpatient Plan of Care  Goal: Plan of Care Review  Outcome: Ongoing, Progressing  Flowsheets (Taken 12/1/2023 9107)  Progress: no change  Plan of Care Reviewed With: patient  Goal: Patient-Specific Goal (Individualized)  Outcome: Ongoing, Progressing  Goal: Absence of Hospital-Acquired Illness or Injury  Outcome: Ongoing, Progressing  Goal: Optimal Comfort and Wellbeing  Outcome: Ongoing, Progressing  Goal: Readiness for Transition of Care  Outcome: Ongoing, Progressing     Problem: Breathing Pattern Ineffective  Goal: Effective Breathing Pattern  Outcome: Ongoing, Progressing   Goal Outcome Evaluation:  Plan of Care Reviewed With: patient        Progress: no change

## 2023-12-02 NOTE — PLAN OF CARE
Goal Outcome Evaluation:                 Problem: Adult Inpatient Plan of Care  Goal: Plan of Care Review  12/2/2023 1621 by Evan Collins RN  Outcome: Ongoing, Progressing  12/2/2023 1621 by Evan Collins RN  Reactivated  Goal: Patient-Specific Goal (Individualized)  12/2/2023 1621 by Evan Collins RN  Outcome: Ongoing, Progressing  12/2/2023 1621 by Evan Collins RN  Reactivated  Goal: Absence of Hospital-Acquired Illness or Injury  12/2/2023 1621 by Evan Collins RN  Outcome: Ongoing, Progressing  12/2/2023 1621 by Evan Collins RN  Reactivated  Intervention: Identify and Manage Fall Risk  Recent Flowsheet Documentation  Taken 12/2/2023 1400 by Evan Collins RN  Safety Promotion/Fall Prevention:   activity supervised   assistive device/personal items within reach   clutter free environment maintained   fall prevention program maintained   nonskid shoes/slippers when out of bed   room organization consistent   safety round/check completed  Taken 12/2/2023 1200 by Evan Collins RN  Safety Promotion/Fall Prevention:   activity supervised   assistive device/personal items within reach   clutter free environment maintained   fall prevention program maintained   nonskid shoes/slippers when out of bed   room organization consistent   safety round/check completed  Taken 12/2/2023 0800 by Evan Collins RN  Safety Promotion/Fall Prevention:   activity supervised   assistive device/personal items within reach   clutter free environment maintained   fall prevention program maintained   nonskid shoes/slippers when out of bed   room organization consistent   safety round/check completed  Intervention: Prevent Skin Injury  Recent Flowsheet Documentation  Taken 12/2/2023 1200 by Evan Collins RN  Skin Protection:   adhesive use limited   incontinence pads utilized   transparent dressing maintained   tubing/devices free from skin contact  Taken 12/2/2023 0800 by Evan Collins RN  Body  Position: position changed independently  Skin Protection:   adhesive use limited   incontinence pads utilized   tubing/devices free from skin contact   transparent dressing maintained  Intervention: Prevent and Manage VTE (Venous Thromboembolism) Risk  Recent Flowsheet Documentation  Taken 12/2/2023 1200 by Evan Collins RN  Activity Management: bedrest  Range of Motion:   active ROM (range of motion) encouraged   ROM (range of motion) performed  Taken 12/2/2023 0800 by Evan Collins RN  Activity Management: bedrest  Range of Motion: active ROM (range of motion) encouraged  Intervention: Prevent Infection  Recent Flowsheet Documentation  Taken 12/2/2023 1400 by Evan Collins RN  Infection Prevention:   environmental surveillance performed   hand hygiene promoted   rest/sleep promoted   single patient room provided  Taken 12/2/2023 1200 by Evan Collins RN  Infection Prevention:   environmental surveillance performed   hand hygiene promoted   rest/sleep promoted   single patient room provided  Taken 12/2/2023 0800 by Evan Collins RN  Infection Prevention:   environmental surveillance performed   hand hygiene promoted   rest/sleep promoted   single patient room provided  Goal: Optimal Comfort and Wellbeing  12/2/2023 1621 by Evan Collins RN  Outcome: Ongoing, Progressing  12/2/2023 1621 by Evan Collins RN  Reactivated  Intervention: Provide Person-Centered Care  Recent Flowsheet Documentation  Taken 12/2/2023 1200 by Evan Collins RN  Trust Relationship/Rapport:   care explained   choices provided   emotional support provided   empathic listening provided   questions answered   thoughts/feelings acknowledged   reassurance provided   questions encouraged  Taken 12/2/2023 0800 by Evan Collins RN  Trust Relationship/Rapport:   care explained   choices provided   emotional support provided   questions answered   empathic listening provided   questions encouraged   reassurance  provided   thoughts/feelings acknowledged  Goal: Readiness for Transition of Care  12/2/2023 1621 by Evan Collins RN  Outcome: Ongoing, Progressing  12/2/2023 1621 by Evan Collins RN  Reactivated     Problem: Breathing Pattern Ineffective  Goal: Effective Breathing Pattern  Intervention: Promote Improved Breathing Pattern  Recent Flowsheet Documentation  Taken 12/2/2023 1200 by Evan Collins RN  Supportive Measures: active listening utilized  Airway/Ventilation Management: airway patency maintained  Taken 12/2/2023 0800 by Evan Collins RN  Supportive Measures: active listening utilized  Head of Bed (HOB) Positioning: HOB elevated  Airway/Ventilation Management: airway patency maintained
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
## 2023-12-03 ENCOUNTER — APPOINTMENT (OUTPATIENT)
Dept: CARDIOLOGY | Facility: HOSPITAL | Age: 88
DRG: 197 | End: 2023-12-03
Payer: MEDICARE

## 2023-12-03 PROBLEM — Z99.81 CHRONIC HYPOXIC RESPIRATORY FAILURE, ON HOME OXYGEN THERAPY: Chronic | Status: ACTIVE | Noted: 2023-12-03

## 2023-12-03 PROBLEM — J96.11 CHRONIC HYPOXIC RESPIRATORY FAILURE, ON HOME OXYGEN THERAPY: Chronic | Status: ACTIVE | Noted: 2023-12-03

## 2023-12-03 LAB
BH CV ECHO MEAS - ACS: 1.9 CM
BH CV ECHO MEAS - AO MAX PG: 5.4 MMHG
BH CV ECHO MEAS - AO MEAN PG: 3 MMHG
BH CV ECHO MEAS - AO V2 MAX: 116 CM/SEC
BH CV ECHO MEAS - AO V2 VTI: 21.8 CM
BH CV ECHO MEAS - AVA(I,D): 1.91 CM2
BH CV ECHO MEAS - EDV(CUBED): 64 ML
BH CV ECHO MEAS - EDV(MOD-SP4): 71.5 ML
BH CV ECHO MEAS - EF(MOD-BP): 56 %
BH CV ECHO MEAS - EF(MOD-SP4): 55.7 %
BH CV ECHO MEAS - ESV(CUBED): 24.4 ML
BH CV ECHO MEAS - ESV(MOD-SP4): 31.7 ML
BH CV ECHO MEAS - FS: 27.5 %
BH CV ECHO MEAS - IVS/LVPW: 1 CM
BH CV ECHO MEAS - IVSD: 1.1 CM
BH CV ECHO MEAS - LA DIMENSION: 3.3 CM
BH CV ECHO MEAS - LAT PEAK E' VEL: 9.4 CM/SEC
BH CV ECHO MEAS - LV DIASTOLIC VOL/BSA (35-75): 37.3 CM2
BH CV ECHO MEAS - LV MASS(C)D: 145.6 GRAMS
BH CV ECHO MEAS - LV MAX PG: 1.83 MMHG
BH CV ECHO MEAS - LV MEAN PG: 1 MMHG
BH CV ECHO MEAS - LV SYSTOLIC VOL/BSA (12-30): 16.5 CM2
BH CV ECHO MEAS - LV V1 MAX: 67.6 CM/SEC
BH CV ECHO MEAS - LV V1 VTI: 12 CM
BH CV ECHO MEAS - LVIDD: 4 CM
BH CV ECHO MEAS - LVIDS: 2.9 CM
BH CV ECHO MEAS - LVOT AREA: 3.5 CM2
BH CV ECHO MEAS - LVOT DIAM: 2.1 CM
BH CV ECHO MEAS - LVPWD: 1.1 CM
BH CV ECHO MEAS - MED PEAK E' VEL: 5.8 CM/SEC
BH CV ECHO MEAS - MR MAX PG: 20.6 MMHG
BH CV ECHO MEAS - MR MAX VEL: 227 CM/SEC
BH CV ECHO MEAS - MV A MAX VEL: 88.6 CM/SEC
BH CV ECHO MEAS - MV DEC SLOPE: 622 CM/SEC2
BH CV ECHO MEAS - MV DEC TIME: 0.13 SEC
BH CV ECHO MEAS - MV E MAX VEL: 48.7 CM/SEC
BH CV ECHO MEAS - MV E/A: 0.55
BH CV ECHO MEAS - MV MAX PG: 3.8 MMHG
BH CV ECHO MEAS - MV MEAN PG: 2 MMHG
BH CV ECHO MEAS - MV P1/2T: 29 MSEC
BH CV ECHO MEAS - MV V2 VTI: 15.8 CM
BH CV ECHO MEAS - MVA(P1/2T): 7.6 CM2
BH CV ECHO MEAS - MVA(VTI): 2.6 CM2
BH CV ECHO MEAS - PA ACC TIME: 0.07 SEC
BH CV ECHO MEAS - PA V2 MAX: 89.3 CM/SEC
BH CV ECHO MEAS - RV MAX PG: 1.47 MMHG
BH CV ECHO MEAS - RV V1 MAX: 60.7 CM/SEC
BH CV ECHO MEAS - RV V1 VTI: 10 CM
BH CV ECHO MEAS - RVDD: 2.8 CM
BH CV ECHO MEAS - SI(MOD-SP4): 20.7 ML/M2
BH CV ECHO MEAS - SV(LVOT): 41.6 ML
BH CV ECHO MEAS - SV(MOD-SP4): 39.8 ML
BH CV ECHO MEAS - TAPSE (>1.6): 1.37 CM
BH CV ECHO MEASUREMENTS AVERAGE E/E' RATIO: 6.41
BH CV ECHO SHUNT ASSESSMENT PERFORMED (HIDDEN SCRIPTING): 1
BH CV XLRA - TDI S': 9.9 CM/SEC
LEFT ATRIUM VOLUME INDEX: 17.1 ML/M2
SINUS: 3.5 CM

## 2023-12-03 PROCEDURE — 25010000002 ENOXAPARIN PER 10 MG: Performed by: STUDENT IN AN ORGANIZED HEALTH CARE EDUCATION/TRAINING PROGRAM

## 2023-12-03 PROCEDURE — 97165 OT EVAL LOW COMPLEX 30 MIN: CPT

## 2023-12-03 PROCEDURE — 93306 TTE W/DOPPLER COMPLETE: CPT | Performed by: INTERNAL MEDICINE

## 2023-12-03 PROCEDURE — 93306 TTE W/DOPPLER COMPLETE: CPT

## 2023-12-03 PROCEDURE — 97162 PT EVAL MOD COMPLEX 30 MIN: CPT

## 2023-12-03 RX ORDER — LISINOPRIL 5 MG/1
5 TABLET ORAL DAILY
Status: DISCONTINUED | OUTPATIENT
Start: 2023-12-03 | End: 2023-12-04

## 2023-12-03 RX ADMIN — LISINOPRIL 5 MG: 5 TABLET ORAL at 10:30

## 2023-12-03 RX ADMIN — ENOXAPARIN SODIUM 40 MG: 100 INJECTION SUBCUTANEOUS at 16:35

## 2023-12-03 RX ADMIN — LEVOTHYROXINE SODIUM 88 MCG: 0.09 TABLET ORAL at 07:33

## 2023-12-03 RX ADMIN — PIRFENIDONE 801 MG: 267 TABLET, FILM COATED ORAL at 16:35

## 2023-12-03 RX ADMIN — ATORVASTATIN CALCIUM 20 MG: 20 TABLET, FILM COATED ORAL at 21:15

## 2023-12-03 RX ADMIN — OXYCODONE HYDROCHLORIDE AND ACETAMINOPHEN 250 MG: 500 TABLET ORAL at 08:34

## 2023-12-03 RX ADMIN — Medication 220 MG: at 08:34

## 2023-12-03 RX ADMIN — Medication 1 TABLET: at 08:34

## 2023-12-03 RX ADMIN — PIRFENIDONE 801 MG: 267 TABLET, FILM COATED ORAL at 21:16

## 2023-12-03 RX ADMIN — PIRFENIDONE 801 MG: 267 TABLET, FILM COATED ORAL at 08:35

## 2023-12-03 RX ADMIN — DOCUSATE SODIUM 50 MG AND SENNOSIDES 8.6 MG 2 TABLET: 8.6; 5 TABLET, FILM COATED ORAL at 21:15

## 2023-12-03 RX ADMIN — ASPIRIN 325 MG ORAL TABLET 325 MG: 325 PILL ORAL at 08:34

## 2023-12-03 NOTE — PLAN OF CARE
Goal Outcome Evaluation:  Plan of Care Reviewed With: patient        Progress: improving  Outcome Evaluation: Patient reports resolved syncope. Plan of care reviewed with patient.

## 2023-12-03 NOTE — PLAN OF CARE
Problem: Adult Inpatient Plan of Care  Goal: Plan of Care Review  12/3/2023 1814 by Rebekah Haji LPN  Outcome: Ongoing, Progressing  12/3/2023 1713 by Rebekah Haji LPN  Outcome: Ongoing, Progressing  Goal: Patient-Specific Goal (Individualized)  12/3/2023 1814 by Rebekah Haji LPN  Outcome: Ongoing, Progressing  12/3/2023 1713 by Rebekah Haji LPN  Outcome: Ongoing, Progressing  Goal: Absence of Hospital-Acquired Illness or Injury  12/3/2023 1814 by Rebekah Haji LPN  Outcome: Ongoing, Progressing  12/3/2023 1713 by Rebekah Haji LPN  Outcome: Ongoing, Progressing  Intervention: Identify and Manage Fall Risk  Recent Flowsheet Documentation  Taken 12/3/2023 1800 by Rebekah Haji LPN  Safety Promotion/Fall Prevention: safety round/check completed  Taken 12/3/2023 1600 by Rebekah Haji LPN  Safety Promotion/Fall Prevention: safety round/check completed  Taken 12/3/2023 1400 by Rebekah Haji LPN  Safety Promotion/Fall Prevention: safety round/check completed  Taken 12/3/2023 1215 by Rebekah Haji LPN  Safety Promotion/Fall Prevention: safety round/check completed  Taken 12/3/2023 1000 by Rebekah Haji LPN  Safety Promotion/Fall Prevention: safety round/check completed  Taken 12/3/2023 0830 by Rebekah Haji LPN  Safety Promotion/Fall Prevention: safety round/check completed  Intervention: Prevent Infection  Recent Flowsheet Documentation  Taken 12/3/2023 1800 by Rebekah Haji LPN  Infection Prevention:   hand hygiene promoted   rest/sleep promoted   single patient room provided  Taken 12/3/2023 1600 by Rebekah Haji LPN  Infection Prevention:   hand hygiene promoted   rest/sleep promoted   single patient room provided  Taken 12/3/2023 1400 by Rebekah Haji LPN  Infection Prevention:   hand hygiene promoted   single patient room provided   rest/sleep promoted  Taken 12/3/2023 1215 by Rebekah Haji LPN  Infection Prevention:   hand hygiene promoted   rest/sleep promoted   single patient room  provided  Taken 12/3/2023 1000 by Rebekah Haji LPN  Infection Prevention:   hand hygiene promoted   rest/sleep promoted   single patient room provided  Taken 12/3/2023 0830 by Rebekah Haji LPN  Infection Prevention:   hand hygiene promoted   rest/sleep promoted   single patient room provided  Goal: Optimal Comfort and Wellbeing  12/3/2023 1814 by Rebekah Haji LPN  Outcome: Ongoing, Progressing  12/3/2023 1713 by Rebekah Haji LPN  Outcome: Ongoing, Progressing  Goal: Readiness for Transition of Care  12/3/2023 1814 by Rebekah Haji LPN  Outcome: Ongoing, Progressing  12/3/2023 1713 by Rebekah Haji LPN  Outcome: Ongoing, Progressing     Problem: Fall Injury Risk  Goal: Absence of Fall and Fall-Related Injury  12/3/2023 1814 by Rebekah Haji LPN  Outcome: Ongoing, Progressing  12/3/2023 1713 by Rebekah Haji LPN  Outcome: Ongoing, Progressing  Intervention: Identify and Manage Contributors  Recent Flowsheet Documentation  Taken 12/3/2023 1800 by Rebekah Haji LPN  Medication Review/Management: medications reviewed  Taken 12/3/2023 1600 by Rebekah Haji LPN  Medication Review/Management: medications reviewed  Taken 12/3/2023 1400 by Rebekah Haji LPN  Medication Review/Management: medications reviewed  Taken 12/3/2023 1215 by Rebekah Haji LPN  Medication Review/Management: medications reviewed  Taken 12/3/2023 1000 by Rebekah Haji LPN  Medication Review/Management: medications reviewed  Taken 12/3/2023 0830 by Rebekah Haji LPN  Medication Review/Management: medications reviewed  Intervention: Promote Injury-Free Environment  Recent Flowsheet Documentation  Taken 12/3/2023 1800 by Rebekah Haji LPN  Safety Promotion/Fall Prevention: safety round/check completed  Taken 12/3/2023 1600 by Rebekah Haji LPN  Safety Promotion/Fall Prevention: safety round/check completed  Taken 12/3/2023 1400 by Rebekah Haji LPN  Safety Promotion/Fall Prevention: safety round/check completed  Taken 12/3/2023 1215 by  Haji, Rebekah, LPN  Safety Promotion/Fall Prevention: safety round/check completed  Taken 12/3/2023 1000 by Rebekah Haji LPN  Safety Promotion/Fall Prevention: safety round/check completed  Taken 12/3/2023 0830 by Rebekah Haji LPN  Safety Promotion/Fall Prevention: safety round/check completed   Goal Outcome Evaluation:

## 2023-12-03 NOTE — PLAN OF CARE
Goal Outcome Evaluation:  Plan of Care Reviewed With: patient            87 y/o M presented with c/o near syncope. CXR indicates low lung volumes, likely reflecting chronic interstitial lung disease. Cj carotid duplex indicates R internal carotid with normal flow, L internal carotid <50% stenosis. Duplex LLE (-) DVT. PMHx significant for pulmonary fibrosis, HLD, and HTN. Pt is followed by palliative who assist at home. At baseline pt resides with wife and daughter in [facility] with 2 ILEANA. Pt typically independent with ADLs, no AD for mobility. Recently has required increased assistance d/t desaturating with minimal exertion. This date, pt A&Ox4, in supine and with family at bedside. Pt with NC out, on RA saturating at 87%, placing 3.5L on and recovering to 93%. Pt completes bed mobility, functional transfers and functional mobility with SBA-CGA. He ambulated 25' without AD on 4L with SaO2 86% following. Patient appears at or near his functional baseline and is safe to d/c home from PT standpoint with no additional PT needs.       Anticipated Discharge Disposition (PT): home

## 2023-12-03 NOTE — PROGRESS NOTES
[facility]   Daily Progress Note    Patient Name: Willis Lechuga  : 1935  MRN: [id number]  Primary Care Physician:  Blake Giraldo MD  Date of admission: 2023    Subjective      Admitted with near syncope in the setting of end-stage pulmonary fibrosis    Did OK overnight.  Carotid ultrasound was fairly unremarkable.  Left lower extremity venous Doppler study was negative for DVT.  Blood pressures have normalized and look much better.  I restarted his ACE inhibitor this morning.  They are just now completing his echocardiogram this morning.  He had some urinary incontinence this morning when he was trying to move around, which is not typical for him.  Still awaiting therapy evaluations.  He did not sleep well last night and is quite tired.  He is on his baseline level of supplemental oxygen use.  Discussed the case with the bedside nursing staff. No other acute concerns.    Objective      Vitals:   Temp:  [97.4 °F (36.3 °C)-97.9 °F (36.6 °C)] 97.6 °F (36.4 °C)  Heart Rate:  [71-88] 88  Resp:  [15-31] 16  BP: (100-150)/(54-67) 145/63  Flow (L/min):  [3] 3    Physical Exam   GEN: WDWN elderly gentleman, no acute distress  HEENT: NCAT, PERRLA, moist mucous membranes  NECK: Supple, midline trachea  CARD: RRR, no appreciable M/R/G, no peripheral edema--no calf tenderness on either leg today  PULM: Fairly clear to auscultation bilaterally, diminished at the bases, non-distressed  ABD: soft, NTND, normoactive bowel sounds throughout  NEURO: Grossly intact, non-focal exam  PSYCH: Pleasant    Result Review    I have personally reviewed the results from the time of this admission to 12/3/2023 07:34 EST and agree with these findings:  [x]  Laboratory  [x]  Microbiology  [x]  Radiology  [x]  EKG/Telemetry   [x]  Cardiology/Vascular   []  Pathology  [x]  Old records  []  Other:    Assessment & Plan      vitamin C, 250 mg, Oral, Daily  aspirin, 325 mg, Oral, Daily   Or  aspirin, 300 mg,  Rectal, Daily  atorvastatin, 20 mg, Oral, Nightly  enoxaparin, 40 mg, Subcutaneous, Daily  levothyroxine, 88 mcg, Oral, Q AM  multivitamin with minerals, 1 tablet, Oral, Daily  Pirfenidone, 3 tablet, Oral, TID  sennosides-docusate, 2 tablet, Oral, BID  zinc sulfate, 220 mg, Oral, Daily             Active Hospital Problems:  Active Hospital Problems    Diagnosis     **Near syncope     WILMAR (obstructive sleep apnea)     Dependence on supplemental oxygen     Idiopathic pulmonary fibrosis     Degenerative joint disease     Hypothyroidism     Hyperlipidemia     Hypertension     Vitamin D deficiency      Plan:   Presyncope   -EKG reviewed  -CT reviewed  -Carotid US fairly unremarkable.  Echocardiogram is being completed during my visit ending cardiology interpretation.  Still waiting to work with therapy as well.  -Left lower extremity venous Doppler was negative for DVT.    -I suspect this was due to low blood pressure from a combination of less than optimal fluid intake and routine blood pressure medication use, but certainly at risk for right-sided heart failure due to his chronic lung disease.    Pulmonary fibrosis, end-stage  WILMAR  - Follows with Dr. Vincent  - Consider pulmonary consult as appropriate, not currently necessary     Chronic conditions: Hypertension, hypothyroid, hyperlipidemia,  - Continue home medications as appropriate, holding his lisinopril for now  - Monitor routine labs and vital signs, blood pressures look good now.  - Use home supply of Esbriet      Nutrition:   Diet: Cardiac Diets; Healthy Heart (2-3 Na+); Texture: Regular Texture (IDDSI 7); Fluid Consistency: Thin (IDDSI 0)     DVT prophylaxis:  Medical DVT prophylaxis orders are present.    CODE STATUS:    Medical Intervention Limits: NO intubation (DNI)  Code Status (Patient has no pulse and is not breathing): No CPR (Do Not Attempt to Resuscitate)  Medical Interventions (Patient has pulse or is breathing): Limited Support    Disposition:  I  expect patient to be discharged home this afternoon versus tomorrow once his work-up is complete.    Electronically signed by Lucho Nassar MD, 12/03/23, 07:34 EST.  [facility]ist Team
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
## 2023-12-03 NOTE — PLAN OF CARE
Problem: Adult Inpatient Plan of Care  Goal: Plan of Care Review  Outcome: Ongoing, Progressing  Goal: Patient-Specific Goal (Individualized)  Outcome: Ongoing, Progressing  Goal: Absence of Hospital-Acquired Illness or Injury  Outcome: Ongoing, Progressing  Intervention: Identify and Manage Fall Risk  Recent Flowsheet Documentation  Taken 12/3/2023 1600 by Rebekah Haji LPN  Safety Promotion/Fall Prevention: safety round/check completed  Taken 12/3/2023 1400 by Rebekah Haji LPN  Safety Promotion/Fall Prevention: safety round/check completed  Taken 12/3/2023 1215 by Rebekah Haji LPN  Safety Promotion/Fall Prevention: safety round/check completed  Taken 12/3/2023 1000 by Rebekah Haji LPN  Safety Promotion/Fall Prevention: safety round/check completed  Taken 12/3/2023 0830 by Rebekah Haji LPN  Safety Promotion/Fall Prevention: safety round/check completed  Intervention: Prevent Infection  Recent Flowsheet Documentation  Taken 12/3/2023 1600 by Rebekah Haji LPN  Infection Prevention:   hand hygiene promoted   rest/sleep promoted   single patient room provided  Taken 12/3/2023 1400 by Rebekah Haji LPN  Infection Prevention:   hand hygiene promoted   single patient room provided   rest/sleep promoted  Taken 12/3/2023 1215 by Rebekah Haji LPN  Infection Prevention:   hand hygiene promoted   rest/sleep promoted   single patient room provided  Taken 12/3/2023 1000 by Rebekah Haji LPN  Infection Prevention:   hand hygiene promoted   rest/sleep promoted   single patient room provided  Taken 12/3/2023 0830 by Rebekah Haji LPN  Infection Prevention:   hand hygiene promoted   rest/sleep promoted   single patient room provided  Goal: Optimal Comfort and Wellbeing  Outcome: Ongoing, Progressing  Goal: Readiness for Transition of Care  Outcome: Ongoing, Progressing     Problem: Fall Injury Risk  Goal: Absence of Fall and Fall-Related Injury  Outcome: Ongoing, Progressing  Intervention: Identify and Manage  Contributors  Recent Flowsheet Documentation  Taken 12/3/2023 1600 by Rebekah Haji LPN  Medication Review/Management: medications reviewed  Taken 12/3/2023 1400 by Rebekah Haji LPN  Medication Review/Management: medications reviewed  Taken 12/3/2023 1215 by Rebekah Haji LPN  Medication Review/Management: medications reviewed  Taken 12/3/2023 1000 by Rebekah Haji LPN  Medication Review/Management: medications reviewed  Taken 12/3/2023 0830 by Rebekah Haji LPN  Medication Review/Management: medications reviewed  Intervention: Promote Injury-Free Environment  Recent Flowsheet Documentation  Taken 12/3/2023 1600 by Rebekah Haji LPN  Safety Promotion/Fall Prevention: safety round/check completed  Taken 12/3/2023 1400 by Rebekah Haji LPN  Safety Promotion/Fall Prevention: safety round/check completed  Taken 12/3/2023 1215 by Rebekah Haji LPN  Safety Promotion/Fall Prevention: safety round/check completed  Taken 12/3/2023 1000 by Rebekah Haji LPN  Safety Promotion/Fall Prevention: safety round/check completed  Taken 12/3/2023 0830 by Rebekah Haji LPN  Safety Promotion/Fall Prevention: safety round/check completed   Goal Outcome Evaluation:

## 2023-12-03 NOTE — PLAN OF CARE
Goal Outcome Evaluation:  Plan of Care Reviewed With: patient        Progress: no change  Outcome Evaluation: Pt is an 87 y/o M admitted to [facility] 12/1/23 with c/o near syncope. CXR indicates low lung volumes, likely reflecting chronic interstitial lung disease. Cj carotid duplex indicates R internal carotid with normal flow, L internal carotid <50% stenosis. Duplex LLE (-) DVT. PMHx significant for pulmonary fibrosis, HLD, and HTN. Pt is followed by palliative who assist at home. At baseline pt resides with wife and daughter in [facility] with 2 ILEANA. Pt typically independent with ADLs, no AD for mobility. Recently has required increased assistance d/t desaturating with minimal exertion. This date, pt A&Ox4, in supine and with family at bedside. Pt with NC out, on RA saturating at 87%, placing 3.5L on and recovering to 93%. Pt completes bed mobility, functional transfers and functional mobility with SBA-CGA. Pt dons socks with mod I sitting edge of bed. Pt on 4L with activity and remained >92% throughout. Pt appears to be functioning at baseline and likely to be safe to d/c home with family assist. OT will complete orders at this time.      Anticipated Discharge Disposition (OT): home with assist

## 2023-12-03 NOTE — NURSING NOTE
Second nurse skin assessment completed with BSSR. This nurse agrees with primary nurse assessment.

## 2023-12-04 ENCOUNTER — READMISSION MANAGEMENT (OUTPATIENT)
Dept: CALL CENTER | Facility: HOSPITAL | Age: 88
End: 2023-12-04
Payer: MEDICARE

## 2023-12-04 VITALS
BODY MASS INDEX: 23.84 KG/M2 | SYSTOLIC BLOOD PRESSURE: 128 MMHG | OXYGEN SATURATION: 97 % | HEIGHT: 69 IN | HEART RATE: 88 BPM | DIASTOLIC BLOOD PRESSURE: 69 MMHG | WEIGHT: 160.94 LBS | RESPIRATION RATE: 18 BRPM | TEMPERATURE: 97.8 F

## 2023-12-04 LAB
ANION GAP SERPL CALCULATED.3IONS-SCNC: 5 MMOL/L (ref 5–15)
BASOPHILS # BLD AUTO: 0 10*3/MM3 (ref 0–0.2)
BASOPHILS NFR BLD AUTO: 0.4 % (ref 0–1.5)
BILIRUB UR QL STRIP: NEGATIVE
BUN SERPL-MCNC: 12 MG/DL (ref 8–23)
BUN/CREAT SERPL: 16 (ref 7–25)
CALCIUM SPEC-SCNC: 9.7 MG/DL (ref 8.6–10.5)
CHLORIDE SERPL-SCNC: 92 MMOL/L (ref 98–107)
CLARITY UR: CLEAR
CO2 SERPL-SCNC: 36 MMOL/L (ref 22–29)
COLOR UR: YELLOW
CREAT SERPL-MCNC: 0.75 MG/DL (ref 0.76–1.27)
DEPRECATED RDW RBC AUTO: 45.1 FL (ref 37–54)
EGFRCR SERPLBLD CKD-EPI 2021: 86.8 ML/MIN/1.73
EOSINOPHIL # BLD AUTO: 0.3 10*3/MM3 (ref 0–0.4)
EOSINOPHIL NFR BLD AUTO: 3.4 % (ref 0.3–6.2)
ERYTHROCYTE [DISTWIDTH] IN BLOOD BY AUTOMATED COUNT: 13.5 % (ref 12.3–15.4)
GLUCOSE SERPL-MCNC: 100 MG/DL (ref 65–99)
GLUCOSE UR STRIP-MCNC: NEGATIVE MG/DL
HCT VFR BLD AUTO: 38.9 % (ref 37.5–51)
HGB BLD-MCNC: 13 G/DL (ref 13–17.7)
HGB UR QL STRIP.AUTO: NEGATIVE
KETONES UR QL STRIP: ABNORMAL
LEUKOCYTE ESTERASE UR QL STRIP.AUTO: NEGATIVE
LYMPHOCYTES # BLD AUTO: 1 10*3/MM3 (ref 0.7–3.1)
LYMPHOCYTES NFR BLD AUTO: 11.6 % (ref 19.6–45.3)
MAGNESIUM SERPL-MCNC: 2.2 MG/DL (ref 1.6–2.4)
MCH RBC QN AUTO: 30.4 PG (ref 26.6–33)
MCHC RBC AUTO-ENTMCNC: 33.4 G/DL (ref 31.5–35.7)
MCV RBC AUTO: 91 FL (ref 79–97)
MONOCYTES # BLD AUTO: 0.7 10*3/MM3 (ref 0.1–0.9)
MONOCYTES NFR BLD AUTO: 7.5 % (ref 5–12)
NEUTROPHILS NFR BLD AUTO: 6.8 10*3/MM3 (ref 1.7–7)
NEUTROPHILS NFR BLD AUTO: 77.1 % (ref 42.7–76)
NITRITE UR QL STRIP: NEGATIVE
NRBC BLD AUTO-RTO: 0.1 /100 WBC (ref 0–0.2)
PH UR STRIP.AUTO: 7.5 [PH] (ref 5–8)
PHOSPHATE SERPL-MCNC: 3.4 MG/DL (ref 2.5–4.5)
PLATELET # BLD AUTO: 245 10*3/MM3 (ref 140–450)
PMV BLD AUTO: 8.1 FL (ref 6–12)
POTASSIUM SERPL-SCNC: 4.7 MMOL/L (ref 3.5–5.2)
PROT UR QL STRIP: NEGATIVE
RBC # BLD AUTO: 4.27 10*6/MM3 (ref 4.14–5.8)
SODIUM SERPL-SCNC: 133 MMOL/L (ref 136–145)
SP GR UR STRIP: 1.01 (ref 1–1.03)
TSH SERPL DL<=0.05 MIU/L-ACNC: 4.77 UIU/ML (ref 0.27–4.2)
UROBILINOGEN UR QL STRIP: ABNORMAL
WBC NRBC COR # BLD AUTO: 8.9 10*3/MM3 (ref 3.4–10.8)

## 2023-12-04 PROCEDURE — 83735 ASSAY OF MAGNESIUM: CPT | Performed by: HOSPITALIST

## 2023-12-04 PROCEDURE — 81003 URINALYSIS AUTO W/O SCOPE: CPT | Performed by: HOSPITALIST

## 2023-12-04 PROCEDURE — 80048 BASIC METABOLIC PNL TOTAL CA: CPT | Performed by: HOSPITALIST

## 2023-12-04 PROCEDURE — 85025 COMPLETE CBC W/AUTO DIFF WBC: CPT | Performed by: HOSPITALIST

## 2023-12-04 PROCEDURE — 84100 ASSAY OF PHOSPHORUS: CPT | Performed by: HOSPITALIST

## 2023-12-04 RX ORDER — PIRFENIDONE 801 MG/1
801 TABLET, FILM COATED ORAL 3 TIMES DAILY
Start: 2023-12-04

## 2023-12-04 RX ADMIN — Medication 220 MG: at 10:00

## 2023-12-04 RX ADMIN — LEVOTHYROXINE SODIUM 88 MCG: 0.09 TABLET ORAL at 06:24

## 2023-12-04 RX ADMIN — Medication 1 TABLET: at 10:00

## 2023-12-04 RX ADMIN — ASPIRIN 325 MG ORAL TABLET 325 MG: 325 PILL ORAL at 10:00

## 2023-12-04 RX ADMIN — PIRFENIDONE 801 MG: 267 TABLET, FILM COATED ORAL at 09:59

## 2023-12-04 RX ADMIN — OXYCODONE HYDROCHLORIDE AND ACETAMINOPHEN 250 MG: 500 TABLET ORAL at 10:00

## 2023-12-04 NOTE — OUTREACH NOTE
Prep Survey      Flowsheet Row Responses   [facility] facility patient discharged from? [facility]   Is LACE score < 7 ? Yes   Eligibility [facility]   Date of Admission 12/01/23   Date of Discharge 12/04/23   Discharge Disposition Home or Self Care   Discharge diagnosis near syncope   Does the patient have one of the following disease processes/diagnoses(primary or secondary)? Other   Does the patient have Home health ordered? No   Is there a DME ordered? No   Prep survey completed? Yes            Abbie CURTIS - Registered Nurse

## 2023-12-04 NOTE — DISCHARGE SUMMARY
[facility] Medicine Services  Discharge Summary    Date of Service: 23  Patient condition: Stable    Patient Name: Willis Lechuga  : 1935  MRN: [id number]    Date of Admission: 2023  Discharge Diagnosis: Pre-syncope, dehydration, hypotension  Date of Discharge:  23    Primary Care Physician: Blake Giraldo MD      Presenting Problem:   Pulmonary fibrosis [J84.10]  Near syncope [R55]  Hypotension, unspecified hypotension type [I95.9]    Active and Resolved Hospital Problems:  Active Hospital Problems    Diagnosis POA    **Near syncope [R55] Yes    Chronic hypoxic respiratory failure, on home oxygen therapy [J96.11, Z99.81] Not Applicable    WILMAR (obstructive sleep apnea) [G47.33] Yes    Idiopathic pulmonary fibrosis [J84.112] Yes    Degenerative joint disease [M19.90] Yes    Hypothyroidism [E03.9] Yes    Hyperlipidemia [E78.5] Yes    Hypertension [I10] Yes    Vitamin D deficiency [E55.9] Yes      Resolved Hospital Problems   No resolved problems to display.         Hospital Course     Hospital Course:  Willis Lechuga is a 88 y.o. male Patient is an 88-year-old gentleman with history of pulmonary fibrosis, chronic respiratory failure with hypoxia, hypothyroidism, hypertension, dyslipidemia who presented with presyncopal episode.  Patient reported that he got out of the chair and reported lightheadedness felt like he was going to pass out and sat down and rested.  No recent changes medications.  No fever chills or sweats.  Says he usually does not drink enough fluids.  Was admitted and had echocardiogram which showed normal ejection fraction and mildly dilated right ventricle.  Carotid duplex was negative with left internal carotid artery demonstrated less than 50% stenosis.  Lower extremity duplex was negative.  No arrhythmias on telemetry.  TSH was slightly elevated and discussed with patient follow-up with his primary physician and repeat in 4 to 6 weeks and adjust  as needed as an outpatient.  Patient likely had presyncopal episode due to hypotension/dehydration.  He was treated with IV fluids and was taken off his lisinopril.  He feels back to baseline with normal lightheadedness.  Will follow-up with his primary care physician as outpatient and check his blood pressure as an outpatient.  Patient is stable for discharge with outpatient follow-up.  UA was done was negative for evidence of UTI.        DISCHARGE Follow Up Recommendations for labs and diagnostics: TSH as OP      Reasons For Change In Medications and Indications for New Medications:      Day of Discharge     Vital Signs:  Temp:  [96.9 °F (36.1 °C)-98.7 °F (37.1 °C)] 97.8 °F (36.6 °C)  Heart Rate:  [71-89] 88  Resp:  [12-28] 18  BP: (110-133)/(58-69) 128/69  Flow (L/min):  [3] 3    Physical Exam:  Physical Exam   General: No acute distress  HEENT: Neck supple, normal oral mucosa, no masses, no lymphadenopathy  Lungs: Clear bilaterally, no wheezing, no crackles, no rhonchi. Equal excursions.   CV - Normal S1/S2, no murmur, regular rate and rhythm   Abdomen - Soft, nontender, nondistended, normal bowel sounds  Extremities - no edema, no erythema  Neuro - No focal weakness, normal sensation  Psych - Alert and oriented x3  Skin - no wounds or lesions.         Pertinent  and/or Most Recent Results     LAB RESULTS:      Lab 12/04/23  1010 12/01/23 2014   WBC 8.90 8.40   HEMOGLOBIN 13.0 12.6*   HEMATOCRIT 38.9 39.2   PLATELETS 245 247   NEUTROS ABS 6.80 6.00   LYMPHS ABS 1.00 1.30   MONOS ABS 0.70 0.80   EOS ABS 0.30 0.20   MCV 91.0 93.1         Lab 12/04/23  1010 12/01/23  2216 12/01/23 2014   SODIUM 133*  --  134*   POTASSIUM 4.7  --  4.3   CHLORIDE 92*  --  96*   CO2 36.0*  --  30.0*   ANION GAP 5.0  --  8.0   BUN 12  --  22   CREATININE 0.75*  --  1.11   EGFR 86.8  --  63.9   GLUCOSE 100*  --  138*   CALCIUM 9.7  --  9.3   MAGNESIUM 2.2  --   --    PHOSPHORUS 3.4  --   --    TSH  --  4.770*  --          Lab  12/01/23 2014   TOTAL PROTEIN 6.5   ALBUMIN 3.2*   GLOBULIN 3.3   ALT (SGPT) 12   AST (SGOT) 16   BILIRUBIN <0.2   ALK PHOS 59         Lab 12/01/23  2216 12/01/23 2014   PROBNP  --  155.2   HSTROP T 12 16                 Lab 11/28/23  1610   PH, ARTERIAL 7.427   PCO2, ARTERIAL 46.4   PO2 ART 48.8*   O2 SATURATION ART 84.6*   HCO3 ART 30.6*   BASE EXCESS ART 5.2*     Brief Urine Lab Results  (Last result in the past 365 days)        Color   Clarity   Blood   Leuk Est   Nitrite   Protein   CREAT   Urine HCG        12/04/23 1023 Yellow   Clear   Negative   Negative   Negative   Negative                 Microbiology Results (last 10 days)       ** No results found for the last 240 hours. **            XR Chest 1 View    Result Date: 12/1/2023  Impression: Impression: 1. Low lung volumes with diffuse bilateral interstitial opacities which appear similar to prior examinations likely reflecting chronic interstitial lung disease. No definite radiographic worsening or clear superimposed pneumonia. Electronically Signed: Antonio Kay  12/1/2023 8:42 PM EST  Workstation ID: [id number]     Results for orders placed during the hospital encounter of 12/01/23    Duplex Venous Lower Extremity - Left CAR    Interpretation Summary    Normal left lower extremity venous duplex scan.      Results for orders placed during the hospital encounter of 12/01/23    Duplex Venous Lower Extremity - Left CAR    Interpretation Summary    Normal left lower extremity venous duplex scan.      Results for orders placed during the hospital encounter of 12/01/23    Adult Transthoracic Echo Complete With Contrast if Necessary Per Protocol (With Agitated Saline)    Interpretation Summary    Left ventricular ejection fraction appears to be 56 - 60%.    The right ventricular cavity is mildly dilated.      Labs Pending at Discharge:      Procedures Performed           Consults:   Consults       Date and Time Order Name Status Description    12/1/2023   9:04 PM Hospitalist (on-call MD unless specified)                Discharge Details        Discharge Medications        Changes to Medications        Instructions Start Date   Pirfenidone 801 MG tablet  Commonly known as: Esbriet  What changed:   medication strength  how much to take   801 mg, Oral, 3 times daily             Continue These Medications        Instructions Start Date   alfuzosin 10 MG 24 hr tablet  Commonly known as: UROXATRAL   10 mg, Oral, 2 Times Daily      atorvastatin 20 MG tablet  Commonly known as: LIPITOR   TAKE 1 TABLET DAILY      diphenoxylate-atropine 2.5-0.025 MG per tablet  Commonly known as: LOMOTIL   TAKE 1 TABLET FOUR TIMES A DAY AS NEEDED FOR DIARRHEA FOR UP TO 30 DAYS      levothyroxine 88 MCG tablet  Commonly known as: SYNTHROID, LEVOTHROID  Notes to patient: Take on empty stomach   TAKE 1 TABLET DAILY      meclizine 25 MG tablet  Commonly known as: ANTIVERT   25 mg, Oral, 3 Times Daily PRN      multivitamin with minerals tablet tablet   1 tablet, Oral, Daily      vitamin C 250 MG tablet  Commonly known as: ASCORBIC ACID   1 tablet, Oral, Daily      zinc sulfate 220 (50 Zn) MG capsule  Commonly known as: ZINCATE   220 mg, Oral, Daily             Stop These Medications      lisinopril 5 MG tablet  Commonly known as: PRINIVIL,ZESTRIL              No Known Allergies      Discharge Disposition: home with OP follow up  Home or Self Care    Diet:  Hospital:  Diet Order   Procedures    Diet: Cardiac Diets; Healthy Heart (2-3 Na+); Texture: Regular Texture (IDDSI 7); Fluid Consistency: Thin (IDDSI 0)         Discharge Activity:   Activity Instructions       Activity as Tolerated                CODE STATUS:  Code Status and Medical Interventions:   Ordered at: 12/01/23 0678     Medical Intervention Limits:    NO intubation (DNI)     Code Status (Patient has no pulse and is not breathing):    No CPR (Do Not Attempt to Resuscitate)     Medical Interventions (Patient has pulse or is breathing):     Limited Support         Future Appointments   Date Time Provider Department Center   12/28/2023  3:15 PM [facility]OUTPATIENT [facility]       Additional Instructions for the Follow-ups that You Need to Schedule       Call MD With Problems / Concerns   As directed      Instructions: Primary care provider    Order Comments: Instructions: Primary care provider         Discharge Follow-up with PCP   As directed       Currently Documented PCP:    Blake Giraldo MD    PCP Phone Number:    [phone]     Follow Up Details: Within one week of discharge        Discharge Follow-up with Specialty: Pulmonology follow-up as previously scheduled   As directed      Specialty: Pulmonology follow-up as previously scheduled                Time spent on Discharge including face to face service:  31 minutes    This patient has been  and discussed with . 12/04/23      Signature: Electronically signed by Chantal Quintanilla MD, 12/04/23, 15:17 EST.  [facility] Hospitalist Team

## 2023-12-04 NOTE — PLAN OF CARE
Fair shift spent, medicated as ordered, urine voided in urinal, oxygen therapy maintained, left stable    Goal Outcome Evaluation:    Problem: Adult Inpatient Plan of Care  Goal: Absence of Hospital-Acquired Illness or Injury  Outcome: Ongoing, Progressing  Intervention: Identify and Manage Fall Risk  Recent Flowsheet Documentation  Taken 12/4/2023 0200 by Kris Lechuga RN  Safety Promotion/Fall Prevention:   assistive device/personal items within reach   clutter free environment maintained   fall prevention program maintained   nonskid shoes/slippers when out of bed   room organization consistent   safety round/check completed  Taken 12/4/2023 0000 by Kris Lechuga RN  Safety Promotion/Fall Prevention:   assistive device/personal items within reach   clutter free environment maintained   fall prevention program maintained   nonskid shoes/slippers when out of bed   room organization consistent   safety round/check completed  Taken 12/3/2023 2200 by Kris Lechuga RN  Safety Promotion/Fall Prevention:   assistive device/personal items within reach   fall prevention program maintained   nonskid shoes/slippers when out of bed   room organization consistent   safety round/check completed   clutter free environment maintained  Taken 12/3/2023 2000 by Kris Lechuga RN  Safety Promotion/Fall Prevention:   assistive device/personal items within reach   clutter free environment maintained   fall prevention program maintained   nonskid shoes/slippers when out of bed   room organization consistent   safety round/check completed  Intervention: Prevent Skin Injury  Recent Flowsheet Documentation  Taken 12/4/2023 0000 by Kris Lechuga RN  Body Position:   position changed independently   supine  Taken 12/3/2023 2000 by Kris Lechuga RN  Body Position:   position changed independently   supine  Skin Protection:   adhesive use limited   tubing/devices free from skin contact  Intervention: Prevent and Manage  VTE (Venous Thromboembolism) Risk  Recent Flowsheet Documentation  Taken 12/3/2023 2000 by Kris Lechuga RN  Activity Management: activity encouraged  Range of Motion: active ROM (range of motion) encouraged  Intervention: Prevent Infection  Recent Flowsheet Documentation  Taken 12/4/2023 0200 by Kris Lechuga RN  Infection Prevention:   hand hygiene promoted   rest/sleep promoted   single patient room provided  Taken 12/4/2023 0000 by Kris Lechuga RN  Infection Prevention:   hand hygiene promoted   rest/sleep promoted   single patient room provided  Taken 12/3/2023 2200 by Kris Lechuga RN  Infection Prevention:   hand hygiene promoted   rest/sleep promoted   single patient room provided  Taken 12/3/2023 2000 by Kris Lechuga RN  Infection Prevention:   hand hygiene promoted   rest/sleep promoted   single patient room provided  Goal: Readiness for Transition of Care  Outcome: Ongoing, Progressing     Problem: Fall Injury Risk  Goal: Absence of Fall and Fall-Related Injury  Outcome: Ongoing, Progressing  Intervention: Identify and Manage Contributors  Recent Flowsheet Documentation  Taken 12/4/2023 0200 by Kris Lechuga RN  Medication Review/Management: medications reviewed  Taken 12/4/2023 0000 by Kris Lechuga RN  Medication Review/Management: medications reviewed  Taken 12/3/2023 2200 by Kris Lechuga RN  Medication Review/Management: medications reviewed  Taken 12/3/2023 2000 by Kris Lechuga RN  Medication Review/Management: medications reviewed  Intervention: Promote Injury-Free Environment  Recent Flowsheet Documentation  Taken 12/4/2023 0200 by Kris Lechuga RN  Safety Promotion/Fall Prevention:   assistive device/personal items within reach   clutter free environment maintained   fall prevention program maintained   nonskid shoes/slippers when out of bed   room organization consistent   safety round/check completed  Taken 12/4/2023 0000 by Kris Lechuga  RN  Safety Promotion/Fall Prevention:   assistive device/personal items within reach   clutter free environment maintained   fall prevention program maintained   nonskid shoes/slippers when out of bed   room organization consistent   safety round/check completed  Taken 12/3/2023 2200 by Kris Lechuga RN  Safety Promotion/Fall Prevention:   assistive device/personal items within reach   fall prevention program maintained   nonskid shoes/slippers when out of bed   room organization consistent   safety round/check completed   clutter free environment maintained  Taken 12/3/2023 2000 by Kris Lechuga, RN  Safety Promotion/Fall Prevention:   assistive device/personal items within reach   clutter free environment maintained   fall prevention program maintained   nonskid shoes/slippers when out of bed   room organization consistent   safety round/check completed

## 2023-12-05 ENCOUNTER — TRANSITIONAL CARE MANAGEMENT TELEPHONE ENCOUNTER (OUTPATIENT)
Dept: CALL CENTER | Facility: HOSPITAL | Age: 88
End: 2023-12-05
Payer: MEDICARE

## 2023-12-05 ENCOUNTER — TELEPHONE (OUTPATIENT)
Dept: FAMILY MEDICINE CLINIC | Facility: CLINIC | Age: 88
End: 2023-12-05
Payer: MEDICARE

## 2023-12-05 NOTE — TELEPHONE ENCOUNTER
Caller: Willis Lechuga    Relationship to patient: Self    Best call back number: [phone]    Type of visit: HOSPITAL FOLLOW UP    Additional notes: PATIENT WAS ADMITTED TO [facility] ON 12.01.23 AND WAS DISCHARGED ON 12.04.23    PATIENT STATED HIS FACE BECAME FLUSHED AND HIS VISION WAS BLURRY. PATIENT WAS TREATED FOR LOW BLOOD PRESSURE AND DEHYDRATION     PATIENT WOULD LIKE TO FOLLOW UP WITH DR. WOLF AS SOON AS POSSIBLE    PLEASE ADVISE

## 2023-12-05 NOTE — OUTREACH NOTE
Call Center TCM Note      Flowsheet Row Responses   [facility] patient discharged from? [facility]   Does the patient have one of the following disease processes/diagnoses(primary or secondary)? Other   TCM attempt successful? Yes   Call start time 1603   Call end time 1610   Discharge diagnosis near syncope   Person spoke with today (if not patient) and relationship pt   Meds reviewed with patient/caregiver? Yes   Is the patient having any side effects they believe may be caused by any medication additions or changes? No   Does the patient have all medications ordered at discharge? Yes   Is the patient taking all medications as directed (includes completed medication regime)? Yes   Comments Pulmonology next week   Does the patient have an appointment with their PCP within 7-14 days of discharge? Yes  [12/6/2023 at 1:00 PM]   Psychosocial issues? No   Did the patient receive a copy of their discharge instructions? Yes   Nursing interventions Reviewed instructions with patient   What is the patient's perception of their health status since discharge? Improving   Is the patient/caregiver able to teach back signs and symptoms related to disease process for when to call PCP? Yes   Is the patient/caregiver able to teach back signs and symptoms related to disease process for when to call 911? Yes   Is the patient/caregiver able to teach back the hierarchy of who to call/visit for symptoms/problems? PCP, Specialist, Home health nurse, Urgent Care, ED, 911 Yes   If the patient is a current smoker, are they able to teach back resources for cessation? Not a smoker   TCM call completed? Yes   Wrap up additional comments Pt was very SOB when answering this call, and stayed breathless when speaking. Pt does have a PCP [facility] fu appt tomorrow at 1:00 PM that pt verified, and fu appt with Pulmonology next week. Reviewed meds with pt.   Call end time 1610   Would this patient benefit from a Referral to [facility] Social Work? No    Is the patient interested in additional calls from an ambulatory ? No            Velvet Chatman RN    12/5/2023, 16:12 EST

## 2023-12-06 ENCOUNTER — TELEPHONE (OUTPATIENT)
Dept: FAMILY MEDICINE CLINIC | Facility: CLINIC | Age: 88
End: 2023-12-06

## 2023-12-06 NOTE — TELEPHONE ENCOUNTER
Caller: ELISA MOJICA    Relationship: Child    Best call back number: [phone]     What is the best time to reach you: ANY    Who are you requesting to speak with (clinical staff, provider,  specific staff member): PCP    Do you know the name of the person who called:     What was the call regarding: DAUGHTER CALLED TO ADVISE THAT PATIENT IS NOT GOING TO BE ABLE TO COME IN FOR HIS APPOINTMENT TODAY. HE IS REALLY WEAK AND CAN BARELY STAND,HE HAS A LOT OF SHORTNESS OF BREATH. THEY WOULD LIKE TO KNOW IF PCP CAN DO A TELEPHONE VISIT WITH THE PATIENT INSTEAD OF HIM COMING IN.     Is it okay if the provider responds through MyChart:

## 2023-12-06 NOTE — TELEPHONE ENCOUNTER
I spoke with Willis, he stated he is very weak and short of breath. I spoke with Dr. Giraldo and he recommended patient go to the ED for evaluation. Patient agreed and will have his daughter take him to the ED for eval when she returns home.

## 2023-12-15 ENCOUNTER — TELEPHONE (OUTPATIENT)
Dept: FAMILY MEDICINE CLINIC | Facility: CLINIC | Age: 88
End: 2023-12-15
Payer: MEDICARE

## 2023-12-15 NOTE — TELEPHONE ENCOUNTER
HUB TO RELAY: SENT FOLLOWING [facility] MESSAGE. Dr Giraldo said you are up to date on pneumonia vaccine but should get flu and covid this year.

## 2023-12-15 NOTE — TELEPHONE ENCOUNTER
Caller: Wilils Lechuga    Relationship: Self    Best call back number: [phone]     Who are you requesting to speak with (clinical staff, provider,  specific staff member): CLINICAL STAFF     What was the call regarding: WANTS TO KNOW IF IT RECOMMENDED IF GET COVID BOOSTER AND FLU SHOT AND WANTING TO KNOW WHEN LAST GOT THE PNEUMONIA VACCINE

## 2023-12-15 NOTE — TELEPHONE ENCOUNTER
Mercedez Pedro just needs his flu vaccine And COVID he is up-to-date with pneumonia.  I did not check on his wife's pneumonia status.  She needs all 3 if she did not get pneumonia but at least the flu and COVID she should get

## 2023-12-29 DIAGNOSIS — R09.02 HYPOXEMIA: ICD-10-CM

## 2023-12-29 DIAGNOSIS — G47.33 OBSTRUCTIVE SLEEP APNEA: Primary | ICD-10-CM

## 2024-01-25 ENCOUNTER — TELEPHONE (OUTPATIENT)
Dept: FAMILY MEDICINE CLINIC | Facility: CLINIC | Age: 89
End: 2024-01-25
Payer: MEDICARE

## 2024-01-25 NOTE — TELEPHONE ENCOUNTER
Mercedez tell Willis that I could just as easily kill him as help him if we used morphine incorrectly.  I would really need Dr. Burciaga input on this so he needs to clear it with Dr. Camarillo.  He should probably try to get the breathing machine that Dr. Camarillo is suggesting.  That is a much safer route.

## 2024-01-25 NOTE — TELEPHONE ENCOUNTER
Patient called Dr Vincent's office asking for an rx for Morphine as an alternative treatment for his pulmonary fibrosis.  He was told that Dr Vincent's office does not write controlled substance rx's.  Now patient is calling our office asking for the Morphine rx.  Dr Vincent's office saw that he called our office and wanted you to know that they did not tell him to call us asking for the Morphine rx, like this message suggests.  They tried to get him a Bi-Pap but patient cancelled the sleep study.  Any questions, please call.

## 2024-01-25 NOTE — TELEPHONE ENCOUNTER
Caller: Willis Lechuga    Relationship: Self    Best call back number:     [phone]       What medication are you requesting: MORPHINE    What are your current symptoms:     How long have you been experiencing symptoms:     Have you had these symptoms before:    [x] Yes  [] No    Have you been treated for these symptoms before:   [x] Yes  [] No    If a prescription is needed, what is your preferred pharmacy and phone number: [facility][id number] [address] AT [facility] & [facility] [phone] [facility] [phone]      Additional notes: PATIENT WAS TOLD BY  THAT HE WOULD NEED TO GET A TRILOGY CPAP MACHINE FOR HIS SLEEP APNEA. HE DOESN'T THINK THAT HIS INSURANCE WILL COVER THAT. HE WAS TOLD THAT ANOTHER OPTION IS MORPHINE. HE WOULD LIKE TO SEE ABOUT GETTING MORPHINE INSTEAD.

## 2024-03-25 RX ORDER — ATORVASTATIN CALCIUM 20 MG/1
TABLET, FILM COATED ORAL
Qty: 90 TABLET | Refills: 3 | Status: SHIPPED | OUTPATIENT
Start: 2024-03-25

## 2024-04-10 ENCOUNTER — HOSPITAL ENCOUNTER (OUTPATIENT)
Facility: HOSPITAL | Age: 89
Setting detail: OBSERVATION
Discharge: HOME | End: 2024-04-12
Attending: EMERGENCY MEDICINE | Admitting: EMERGENCY MEDICINE
Payer: MEDICARE

## 2024-04-10 ENCOUNTER — TELEPHONE (OUTPATIENT)
Dept: FAMILY MEDICINE CLINIC | Facility: CLINIC | Age: 89
End: 2024-04-10
Payer: MEDICARE

## 2024-04-10 ENCOUNTER — APPOINTMENT (OUTPATIENT)
Dept: GENERAL RADIOLOGY | Facility: HOSPITAL | Age: 89
End: 2024-04-10
Payer: MEDICARE

## 2024-04-10 DIAGNOSIS — U07.1 COVID-19: Primary | ICD-10-CM

## 2024-04-10 DIAGNOSIS — J96.11 CHRONIC RESPIRATORY FAILURE WITH HYPOXIA: ICD-10-CM

## 2024-04-10 DIAGNOSIS — J84.10 PULMONARY FIBROSIS: ICD-10-CM

## 2024-04-10 LAB
ALBUMIN SERPL-MCNC: 3.6 G/DL (ref 3.5–5.2)
ALBUMIN/GLOB SERPL: 1.1 G/DL
ALP SERPL-CCNC: 61 U/L (ref 39–117)
ALT SERPL W P-5'-P-CCNC: 21 U/L (ref 1–41)
ANION GAP SERPL CALCULATED.3IONS-SCNC: 7 MMOL/L (ref 5–15)
AST SERPL-CCNC: 24 U/L (ref 1–40)
B PARAPERT DNA SPEC QL NAA+PROBE: NOT DETECTED
B PERT DNA SPEC QL NAA+PROBE: NOT DETECTED
BASOPHILS # BLD AUTO: 0.03 10*3/MM3 (ref 0–0.2)
BASOPHILS NFR BLD AUTO: 0.3 % (ref 0–1.5)
BILIRUB SERPL-MCNC: <0.2 MG/DL (ref 0–1.2)
BUN SERPL-MCNC: 17 MG/DL (ref 8–23)
BUN/CREAT SERPL: 23.6 (ref 7–25)
C PNEUM DNA NPH QL NAA+NON-PROBE: NOT DETECTED
CALCIUM SPEC-SCNC: 9.7 MG/DL (ref 8.6–10.5)
CHLORIDE SERPL-SCNC: 96 MMOL/L (ref 98–107)
CO2 SERPL-SCNC: 34 MMOL/L (ref 22–29)
CREAT SERPL-MCNC: 0.72 MG/DL (ref 0.76–1.27)
D DIMER PPP FEU-MCNC: 0.36 MG/L (FEU) (ref 0–0.88)
DEPRECATED RDW RBC AUTO: 44.5 FL (ref 37–54)
EGFRCR SERPLBLD CKD-EPI 2021: 87.9 ML/MIN/1.73
EOSINOPHIL # BLD AUTO: 0.27 10*3/MM3 (ref 0–0.4)
EOSINOPHIL NFR BLD AUTO: 2.9 % (ref 0.3–6.2)
ERYTHROCYTE [DISTWIDTH] IN BLOOD BY AUTOMATED COUNT: 12.3 % (ref 12.3–15.4)
FLUAV SUBTYP SPEC NAA+PROBE: NOT DETECTED
FLUBV RNA ISLT QL NAA+PROBE: NOT DETECTED
GLOBULIN UR ELPH-MCNC: 3.3 GM/DL
GLUCOSE SERPL-MCNC: 125 MG/DL (ref 65–99)
HADV DNA SPEC NAA+PROBE: NOT DETECTED
HCOV 229E RNA SPEC QL NAA+PROBE: NOT DETECTED
HCOV HKU1 RNA SPEC QL NAA+PROBE: NOT DETECTED
HCOV NL63 RNA SPEC QL NAA+PROBE: NOT DETECTED
HCOV OC43 RNA SPEC QL NAA+PROBE: NOT DETECTED
HCT VFR BLD AUTO: 38.3 % (ref 37.5–51)
HGB BLD-MCNC: 12 G/DL (ref 13–17.7)
HMPV RNA NPH QL NAA+NON-PROBE: NOT DETECTED
HOLD SPECIMEN: NORMAL
HOLD SPECIMEN: NORMAL
HPIV1 RNA ISLT QL NAA+PROBE: NOT DETECTED
HPIV2 RNA SPEC QL NAA+PROBE: NOT DETECTED
HPIV3 RNA NPH QL NAA+PROBE: NOT DETECTED
HPIV4 P GENE NPH QL NAA+PROBE: NOT DETECTED
IMM GRANULOCYTES # BLD AUTO: 0.03 10*3/MM3 (ref 0–0.05)
IMM GRANULOCYTES NFR BLD AUTO: 0.3 % (ref 0–0.5)
LYMPHOCYTES # BLD AUTO: 0.96 10*3/MM3 (ref 0.7–3.1)
LYMPHOCYTES NFR BLD AUTO: 10.2 % (ref 19.6–45.3)
M PNEUMO IGG SER IA-ACNC: NOT DETECTED
MAGNESIUM SERPL-MCNC: 2.5 MG/DL (ref 1.6–2.4)
MCH RBC QN AUTO: 30.7 PG (ref 26.6–33)
MCHC RBC AUTO-ENTMCNC: 31.3 G/DL (ref 31.5–35.7)
MCV RBC AUTO: 98 FL (ref 79–97)
MONOCYTES # BLD AUTO: 1.07 10*3/MM3 (ref 0.1–0.9)
MONOCYTES NFR BLD AUTO: 11.3 % (ref 5–12)
NEUTROPHILS NFR BLD AUTO: 7.09 10*3/MM3 (ref 1.7–7)
NEUTROPHILS NFR BLD AUTO: 75 % (ref 42.7–76)
NRBC BLD AUTO-RTO: 0 /100 WBC (ref 0–0.2)
NT-PROBNP SERPL-MCNC: 113.6 PG/ML (ref 0–1800)
PLATELET # BLD AUTO: 213 10*3/MM3 (ref 140–450)
PMV BLD AUTO: 9.4 FL (ref 6–12)
POTASSIUM SERPL-SCNC: 4.3 MMOL/L (ref 3.5–5.2)
PROCALCITONIN SERPL-MCNC: 0.05 NG/ML (ref 0–0.25)
PROT SERPL-MCNC: 6.9 G/DL (ref 6–8.5)
RBC # BLD AUTO: 3.91 10*6/MM3 (ref 4.14–5.8)
RHINOVIRUS RNA SPEC NAA+PROBE: NOT DETECTED
RSV RNA NPH QL NAA+NON-PROBE: NOT DETECTED
SARS-COV-2 RNA NPH QL NAA+NON-PROBE: DETECTED
SODIUM SERPL-SCNC: 137 MMOL/L (ref 136–145)
TROPONIN T SERPL HS-MCNC: 15 NG/L
TSH SERPL DL<=0.05 MIU/L-ACNC: 3.14 UIU/ML (ref 0.27–4.2)
WBC NRBC COR # BLD AUTO: 9.45 10*3/MM3 (ref 3.4–10.8)
WHOLE BLOOD HOLD COAG: NORMAL
WHOLE BLOOD HOLD SPECIMEN: NORMAL

## 2024-04-10 PROCEDURE — 99285 EMERGENCY DEPT VISIT HI MDM: CPT

## 2024-04-10 PROCEDURE — 84484 ASSAY OF TROPONIN QUANT: CPT | Performed by: PHYSICIAN ASSISTANT

## 2024-04-10 PROCEDURE — 85379 FIBRIN DEGRADATION QUANT: CPT | Performed by: PHYSICIAN ASSISTANT

## 2024-04-10 PROCEDURE — 25010000002 DEXAMETHASONE PER 1 MG: Performed by: PHYSICIAN ASSISTANT

## 2024-04-10 PROCEDURE — 96374 THER/PROPH/DIAG INJ IV PUSH: CPT

## 2024-04-10 PROCEDURE — 71045 X-RAY EXAM CHEST 1 VIEW: CPT

## 2024-04-10 PROCEDURE — 84443 ASSAY THYROID STIM HORMONE: CPT | Performed by: PHYSICIAN ASSISTANT

## 2024-04-10 PROCEDURE — 84145 PROCALCITONIN (PCT): CPT | Performed by: PHYSICIAN ASSISTANT

## 2024-04-10 PROCEDURE — 83735 ASSAY OF MAGNESIUM: CPT | Performed by: PHYSICIAN ASSISTANT

## 2024-04-10 PROCEDURE — 93005 ELECTROCARDIOGRAM TRACING: CPT | Performed by: PHYSICIAN ASSISTANT

## 2024-04-10 PROCEDURE — 0202U NFCT DS 22 TRGT SARS-COV-2: CPT | Performed by: PHYSICIAN ASSISTANT

## 2024-04-10 PROCEDURE — 85025 COMPLETE CBC W/AUTO DIFF WBC: CPT | Performed by: PHYSICIAN ASSISTANT

## 2024-04-10 PROCEDURE — G0378 HOSPITAL OBSERVATION PER HR: HCPCS

## 2024-04-10 PROCEDURE — 83880 ASSAY OF NATRIURETIC PEPTIDE: CPT | Performed by: PHYSICIAN ASSISTANT

## 2024-04-10 PROCEDURE — 80053 COMPREHEN METABOLIC PANEL: CPT | Performed by: PHYSICIAN ASSISTANT

## 2024-04-10 RX ORDER — BENZONATATE 100 MG/1
200 CAPSULE ORAL 3 TIMES DAILY PRN
Status: DISCONTINUED | OUTPATIENT
Start: 2024-04-10 | End: 2024-04-12 | Stop reason: HOSPADM

## 2024-04-10 RX ORDER — SODIUM CHLORIDE 0.9 % (FLUSH) 0.9 %
10 SYRINGE (ML) INJECTION AS NEEDED
Status: DISCONTINUED | OUTPATIENT
Start: 2024-04-10 | End: 2024-04-12 | Stop reason: HOSPADM

## 2024-04-10 RX ORDER — DEXAMETHASONE SODIUM PHOSPHATE 4 MG/ML
6 INJECTION, SOLUTION INTRA-ARTICULAR; INTRALESIONAL; INTRAMUSCULAR; INTRAVENOUS; SOFT TISSUE ONCE
Status: COMPLETED | OUTPATIENT
Start: 2024-04-10 | End: 2024-04-10

## 2024-04-10 RX ORDER — GUAIFENESIN 600 MG/1
1200 TABLET, EXTENDED RELEASE ORAL EVERY 12 HOURS PRN
Status: DISCONTINUED | OUTPATIENT
Start: 2024-04-10 | End: 2024-04-11 | Stop reason: SDUPTHER

## 2024-04-10 RX ORDER — DEXAMETHASONE SODIUM PHOSPHATE 4 MG/ML
6 INJECTION, SOLUTION INTRA-ARTICULAR; INTRALESIONAL; INTRAMUSCULAR; INTRAVENOUS; SOFT TISSUE DAILY
Status: DISCONTINUED | OUTPATIENT
Start: 2024-04-11 | End: 2024-04-11

## 2024-04-10 RX ADMIN — DEXAMETHASONE SODIUM PHOSPHATE 6 MG: 4 INJECTION, SOLUTION INTRA-ARTICULAR; INTRALESIONAL; INTRAMUSCULAR; INTRAVENOUS; SOFT TISSUE at 23:29

## 2024-04-10 RX ADMIN — NIRMATRELVIR AND RITONAVIR 3 TABLET: KIT at 23:29

## 2024-04-10 NOTE — TELEPHONE ENCOUNTER
Caller: NAOMY MOJICA    Relationship to patient: Child    Best call back number: [phone]    Date of positive COVID19 test: 04/09/24    Date of possible COVID19 exposure: N/A    COVID19 symptoms: COUGH, LOW GRADE FEVER OF 99 DEGREES, FEVER     Date of initial quarantine: N/A    Additional information or concerns: PATIENT'S DAUGHTER CALLED AND STATED THAT THE PATIENT TESTED POSITIVE FOR COVID-19 AND SHE IS WANTING TO SEE WHAT DR. WOLF WANTS THEM TO DO    What is the patients preferred pharmacy: [id number] [address] [facility] AT [facility] & [facility] - [phone] [place] [phone] [facility] [phone]

## 2024-04-10 NOTE — TELEPHONE ENCOUNTER
Tell family we have called in Paxlovid for him to take for 5 days.  They must hold the atorvastatin and the Uroxatrol he has been on while he is taking Paxlovid
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
## 2024-04-11 PROBLEM — J96.10 CHRONIC RESPIRATORY FAILURE: Status: ACTIVE | Noted: 2024-04-11

## 2024-04-11 LAB
ANION GAP SERPL CALCULATED.3IONS-SCNC: 8 MMOL/L (ref 5–15)
BASOPHILS # BLD AUTO: 0.02 10*3/MM3 (ref 0–0.2)
BASOPHILS NFR BLD AUTO: 0.2 % (ref 0–1.5)
BUN SERPL-MCNC: 17 MG/DL (ref 8–23)
BUN/CREAT SERPL: 23.9 (ref 7–25)
CALCIUM SPEC-SCNC: 9.6 MG/DL (ref 8.6–10.5)
CHLORIDE SERPL-SCNC: 97 MMOL/L (ref 98–107)
CO2 SERPL-SCNC: 31 MMOL/L (ref 22–29)
CREAT SERPL-MCNC: 0.71 MG/DL (ref 0.76–1.27)
CRP SERPL-MCNC: 5.68 MG/DL (ref 0–0.5)
DEPRECATED RDW RBC AUTO: 44.4 FL (ref 37–54)
EGFRCR SERPLBLD CKD-EPI 2021: 88.2 ML/MIN/1.73
EOSINOPHIL # BLD AUTO: 0.06 10*3/MM3 (ref 0–0.4)
EOSINOPHIL NFR BLD AUTO: 0.5 % (ref 0.3–6.2)
ERYTHROCYTE [DISTWIDTH] IN BLOOD BY AUTOMATED COUNT: 12.3 % (ref 12.3–15.4)
GLUCOSE SERPL-MCNC: 124 MG/DL (ref 65–99)
HCT VFR BLD AUTO: 36.4 % (ref 37.5–51)
HGB BLD-MCNC: 11.4 G/DL (ref 13–17.7)
IMM GRANULOCYTES # BLD AUTO: 0.06 10*3/MM3 (ref 0–0.05)
IMM GRANULOCYTES NFR BLD AUTO: 0.5 % (ref 0–0.5)
LYMPHOCYTES # BLD AUTO: 0.78 10*3/MM3 (ref 0.7–3.1)
LYMPHOCYTES NFR BLD AUTO: 6.7 % (ref 19.6–45.3)
MCH RBC QN AUTO: 30.4 PG (ref 26.6–33)
MCHC RBC AUTO-ENTMCNC: 31.3 G/DL (ref 31.5–35.7)
MCV RBC AUTO: 97.1 FL (ref 79–97)
MONOCYTES # BLD AUTO: 0.36 10*3/MM3 (ref 0.1–0.9)
MONOCYTES NFR BLD AUTO: 3.1 % (ref 5–12)
NEUTROPHILS NFR BLD AUTO: 10.42 10*3/MM3 (ref 1.7–7)
NEUTROPHILS NFR BLD AUTO: 89 % (ref 42.7–76)
NRBC BLD AUTO-RTO: 0 /100 WBC (ref 0–0.2)
PLATELET # BLD AUTO: 225 10*3/MM3 (ref 140–450)
PMV BLD AUTO: 10.4 FL (ref 6–12)
POTASSIUM SERPL-SCNC: 4.6 MMOL/L (ref 3.5–5.2)
QT INTERVAL: 331 MS
QTC INTERVAL: 450 MS
RBC # BLD AUTO: 3.75 10*6/MM3 (ref 4.14–5.8)
SODIUM SERPL-SCNC: 136 MMOL/L (ref 136–145)
WBC NRBC COR # BLD AUTO: 11.7 10*3/MM3 (ref 3.4–10.8)

## 2024-04-11 PROCEDURE — 25010000002 DEXAMETHASONE PER 1 MG: Performed by: PHYSICIAN ASSISTANT

## 2024-04-11 PROCEDURE — 97161 PT EVAL LOW COMPLEX 20 MIN: CPT | Performed by: PHYSICAL THERAPIST

## 2024-04-11 PROCEDURE — 94664 DEMO&/EVAL PT USE INHALER: CPT

## 2024-04-11 PROCEDURE — 94761 N-INVAS EAR/PLS OXIMETRY MLT: CPT

## 2024-04-11 PROCEDURE — 94799 UNLISTED PULMONARY SVC/PX: CPT

## 2024-04-11 PROCEDURE — 86140 C-REACTIVE PROTEIN: CPT | Performed by: PHYSICIAN ASSISTANT

## 2024-04-11 PROCEDURE — 85025 COMPLETE CBC W/AUTO DIFF WBC: CPT | Performed by: EMERGENCY MEDICINE

## 2024-04-11 PROCEDURE — G0378 HOSPITAL OBSERVATION PER HR: HCPCS

## 2024-04-11 PROCEDURE — 87070 CULTURE OTHR SPECIMN AEROBIC: CPT | Performed by: PHYSICIAN ASSISTANT

## 2024-04-11 PROCEDURE — 96376 TX/PRO/DX INJ SAME DRUG ADON: CPT

## 2024-04-11 PROCEDURE — 80048 BASIC METABOLIC PNL TOTAL CA: CPT | Performed by: EMERGENCY MEDICINE

## 2024-04-11 PROCEDURE — 94640 AIRWAY INHALATION TREATMENT: CPT

## 2024-04-11 PROCEDURE — 87205 SMEAR GRAM STAIN: CPT | Performed by: PHYSICIAN ASSISTANT

## 2024-04-11 RX ORDER — CHOLECALCIFEROL (VITAMIN D3) 125 MCG
5 CAPSULE ORAL NIGHTLY PRN
Status: DISCONTINUED | OUTPATIENT
Start: 2024-04-11 | End: 2024-04-12 | Stop reason: HOSPADM

## 2024-04-11 RX ORDER — BISACODYL 5 MG/1
5 TABLET, DELAYED RELEASE ORAL DAILY PRN
Status: DISCONTINUED | OUTPATIENT
Start: 2024-04-11 | End: 2024-04-12 | Stop reason: HOSPADM

## 2024-04-11 RX ORDER — ACETAMINOPHEN 325 MG/1
650 TABLET ORAL EVERY 4 HOURS PRN
Status: DISCONTINUED | OUTPATIENT
Start: 2024-04-11 | End: 2024-04-12 | Stop reason: HOSPADM

## 2024-04-11 RX ORDER — ARFORMOTEROL TARTRATE 15 UG/2ML
15 SOLUTION RESPIRATORY (INHALATION)
Status: DISCONTINUED | OUTPATIENT
Start: 2024-04-11 | End: 2024-04-12 | Stop reason: HOSPADM

## 2024-04-11 RX ORDER — NITROGLYCERIN 0.4 MG/1
0.4 TABLET SUBLINGUAL
Status: DISCONTINUED | OUTPATIENT
Start: 2024-04-11 | End: 2024-04-12 | Stop reason: HOSPADM

## 2024-04-11 RX ORDER — ACETAMINOPHEN 160 MG/5ML
650 SOLUTION ORAL EVERY 4 HOURS PRN
Status: DISCONTINUED | OUTPATIENT
Start: 2024-04-11 | End: 2024-04-12 | Stop reason: HOSPADM

## 2024-04-11 RX ORDER — ALBUTEROL SULFATE 90 UG/1
2 AEROSOL, METERED RESPIRATORY (INHALATION)
Status: DISCONTINUED | OUTPATIENT
Start: 2024-04-11 | End: 2024-04-12 | Stop reason: HOSPADM

## 2024-04-11 RX ORDER — POLYETHYLENE GLYCOL 3350 17 G/17G
17 POWDER, FOR SOLUTION ORAL DAILY PRN
Status: DISCONTINUED | OUTPATIENT
Start: 2024-04-11 | End: 2024-04-12 | Stop reason: HOSPADM

## 2024-04-11 RX ORDER — ONDANSETRON 4 MG/1
4 TABLET, ORALLY DISINTEGRATING ORAL EVERY 6 HOURS PRN
Status: DISCONTINUED | OUTPATIENT
Start: 2024-04-11 | End: 2024-04-12 | Stop reason: HOSPADM

## 2024-04-11 RX ORDER — ALUMINA, MAGNESIA, AND SIMETHICONE 2400; 2400; 240 MG/30ML; MG/30ML; MG/30ML
15 SUSPENSION ORAL EVERY 6 HOURS PRN
Status: DISCONTINUED | OUTPATIENT
Start: 2024-04-11 | End: 2024-04-12 | Stop reason: HOSPADM

## 2024-04-11 RX ORDER — LEVOTHYROXINE SODIUM 88 UG/1
88 TABLET ORAL
Status: DISCONTINUED | OUTPATIENT
Start: 2024-04-12 | End: 2024-04-12 | Stop reason: HOSPADM

## 2024-04-11 RX ORDER — SODIUM CHLORIDE 0.9 % (FLUSH) 0.9 %
10 SYRINGE (ML) INJECTION AS NEEDED
Status: DISCONTINUED | OUTPATIENT
Start: 2024-04-11 | End: 2024-04-12 | Stop reason: HOSPADM

## 2024-04-11 RX ORDER — SODIUM CHLORIDE 9 MG/ML
40 INJECTION, SOLUTION INTRAVENOUS AS NEEDED
Status: DISCONTINUED | OUTPATIENT
Start: 2024-04-11 | End: 2024-04-12 | Stop reason: HOSPADM

## 2024-04-11 RX ORDER — SODIUM CHLORIDE 0.9 % (FLUSH) 0.9 %
10 SYRINGE (ML) INJECTION EVERY 12 HOURS SCHEDULED
Status: DISCONTINUED | OUTPATIENT
Start: 2024-04-11 | End: 2024-04-12 | Stop reason: HOSPADM

## 2024-04-11 RX ORDER — BISACODYL 10 MG
10 SUPPOSITORY, RECTAL RECTAL DAILY PRN
Status: DISCONTINUED | OUTPATIENT
Start: 2024-04-11 | End: 2024-04-12 | Stop reason: HOSPADM

## 2024-04-11 RX ORDER — GUAIFENESIN 600 MG/1
1200 TABLET, EXTENDED RELEASE ORAL 2 TIMES DAILY
Status: DISCONTINUED | OUTPATIENT
Start: 2024-04-11 | End: 2024-04-12 | Stop reason: HOSPADM

## 2024-04-11 RX ORDER — ACETAMINOPHEN 650 MG/1
650 SUPPOSITORY RECTAL EVERY 4 HOURS PRN
Status: DISCONTINUED | OUTPATIENT
Start: 2024-04-11 | End: 2024-04-12 | Stop reason: HOSPADM

## 2024-04-11 RX ORDER — AMOXICILLIN 250 MG
2 CAPSULE ORAL 2 TIMES DAILY PRN
Status: DISCONTINUED | OUTPATIENT
Start: 2024-04-11 | End: 2024-04-12 | Stop reason: HOSPADM

## 2024-04-11 RX ORDER — PIRFENIDONE 267 MG/1
3 TABLET, FILM COATED ORAL 3 TIMES DAILY
Status: DISCONTINUED | OUTPATIENT
Start: 2024-04-12 | End: 2024-04-12 | Stop reason: HOSPADM

## 2024-04-11 RX ORDER — ONDANSETRON 2 MG/ML
4 INJECTION INTRAMUSCULAR; INTRAVENOUS EVERY 6 HOURS PRN
Status: DISCONTINUED | OUTPATIENT
Start: 2024-04-11 | End: 2024-04-12 | Stop reason: HOSPADM

## 2024-04-11 RX ORDER — BUDESONIDE 0.5 MG/2ML
1 INHALANT ORAL
Status: DISCONTINUED | OUTPATIENT
Start: 2024-04-11 | End: 2024-04-12 | Stop reason: HOSPADM

## 2024-04-11 RX ORDER — DEXAMETHASONE 6 MG/1
6 TABLET ORAL
Status: DISCONTINUED | OUTPATIENT
Start: 2024-04-12 | End: 2024-04-12 | Stop reason: HOSPADM

## 2024-04-11 RX ADMIN — DEXAMETHASONE SODIUM PHOSPHATE 6 MG: 4 INJECTION, SOLUTION INTRAMUSCULAR; INTRAVENOUS at 10:05

## 2024-04-11 RX ADMIN — ALBUTEROL SULFATE 2 PUFF: 108 AEROSOL, METERED RESPIRATORY (INHALATION) at 19:44

## 2024-04-11 RX ADMIN — Medication 10 ML: at 10:06

## 2024-04-11 RX ADMIN — ALBUTEROL SULFATE 2 PUFF: 108 AEROSOL, METERED RESPIRATORY (INHALATION) at 15:32

## 2024-04-11 RX ADMIN — NIRMATRELVIR AND RITONAVIR 3 TABLET: KIT at 10:06

## 2024-04-11 RX ADMIN — GUAIFENESIN 1200 MG: 600 TABLET, EXTENDED RELEASE ORAL at 21:31

## 2024-04-11 RX ADMIN — NIRMATRELVIR AND RITONAVIR 3 TABLET: KIT at 21:31

## 2024-04-11 RX ADMIN — ALBUTEROL SULFATE 2 PUFF: 108 AEROSOL, METERED RESPIRATORY (INHALATION) at 12:13

## 2024-04-11 RX ADMIN — Medication 10 ML: at 21:39

## 2024-04-11 RX ADMIN — GUAIFENESIN 1200 MG: 600 TABLET, EXTENDED RELEASE ORAL at 04:47
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
## 2024-04-11 NOTE — PLAN OF CARE
Problem: Adult Inpatient Plan of Care  Goal: Plan of Care Review  Outcome: Ongoing, Progressing  Flowsheets (Taken 4/11/2024 1844)  Progress: improving  Plan of Care Reviewed With: patient  Goal: Patient-Specific Goal (Individualized)  Outcome: Ongoing, Progressing  Goal: Absence of Hospital-Acquired Illness or Injury  Outcome: Ongoing, Progressing  Intervention: Identify and Manage Fall Risk  Recent Flowsheet Documentation  Taken 4/11/2024 1800 by Rubina Howell RN  Safety Promotion/Fall Prevention:   clutter free environment maintained   fall prevention program maintained   muscle strengthening facilitated   nonskid shoes/slippers when out of bed   room organization consistent   safety round/check completed  Intervention: Prevent Skin Injury  Recent Flowsheet Documentation  Taken 4/11/2024 1800 by Rubina Howell RN  Body Position: position changed independently  Goal: Optimal Comfort and Wellbeing  Outcome: Ongoing, Progressing  Goal: Readiness for Transition of Care  Outcome: Ongoing, Progressing     Problem: Fall Injury Risk  Goal: Absence of Fall and Fall-Related Injury  Outcome: Ongoing, Progressing  Intervention: Promote Injury-Free Environment  Recent Flowsheet Documentation  Taken 4/11/2024 1800 by Rubina Howell RN  Safety Promotion/Fall Prevention:   clutter free environment maintained   fall prevention program maintained   muscle strengthening facilitated   nonskid shoes/slippers when out of bed   room organization consistent   safety round/check completed   Goal Outcome Evaluation:  Plan of Care Reviewed With: patient        Progress: improving
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
## 2024-04-11 NOTE — PLAN OF CARE
Goal Outcome Evaluation:  Plan of Care Reviewed With: patient           Outcome Evaluation: Patient is an 89 y/o M who was admitted to [facility] on 4/10/24 with c/o cough and congestion x2 days. Pt is positive for COVID-19.  CXR showed Stable chronic interstitial lung disease with fibrosis pattern.  PMHx includes pulmonary fibrosis with use of 3.5 L O2 at baseline, skin CA, HTN, Metatarsalgia, Occipital neuralgia, and sleep apnea.  At baseline pt lives at home with his wife and daughter in a H with 2 ILEANA.  Pt is typically independent with ADLs and no use of AD.  Pt reports over the last month he has been more sedentary and mostly sitting in his recliner.  Daughter has had to assist him with bathing/dressing some. During today's ealuation pt was supervision with bed mobility, SBA with sit to stand, and SBA to [facility] with gait/dynamic standing balance.  He was on 5L O2 NC this date, and desaturated to 87% with activity.  Was able to recover.  At this time, PT recommends pt d/c back home with family assist and  PT recommended if pt agreeable.  PT will continue to follow pt in acute setting to improve his gait distance/endurance.      Anticipated Discharge Disposition (PT): home with assist, home with home health
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
## 2024-04-11 NOTE — TELEPHONE ENCOUNTER
I spoke with daughter about paxlovid being called in but they ended up taking him to the hospital.

## 2024-04-11 NOTE — PLAN OF CARE
Problem: Adult Inpatient Plan of Care  Goal: Plan of Care Review  4/11/2024 0337 by Lydia Dumont RN  Outcome: Ongoing, Progressing  Flowsheets (Taken 4/11/2024 0330)  Outcome Evaluation: Pt was admitted to rm 220 with Covid. Pt O2 drops to low 80's when ambulating to bathroom. Pt states this is normal for him. Pt has pulmonary consult in the morning. Will continue care plan.  4/11/2024 0334 by Lydia Dumont RN  Outcome: Ongoing, Progressing  Flowsheets (Taken 4/11/2024 0330)  Plan of Care Reviewed With: patient  Outcome Evaluation: Pt was admitted to rm 220 with Covid. Pt O2 drops to low 80's when ambulating to bathroom. Pt states this is normal for him. Pt has pulmonary consult in the morning. Will continue care plan.  Goal: Patient-Specific Goal (Individualized)  4/11/2024 0337 by Lydia Dumont RN  Outcome: Ongoing, Progressing  4/11/2024 0334 by Lydia Dumont RN  Outcome: Ongoing, Progressing  Goal: Absence of Hospital-Acquired Illness or Injury  4/11/2024 0337 by Lydia Dumont RN  Outcome: Ongoing, Progressing  4/11/2024 0334 by Lydia Dumont RN  Outcome: Ongoing, Progressing  Intervention: Identify and Manage Fall Risk  Recent Flowsheet Documentation  Taken 4/11/2024 0205 by Lydia Dumont RN  Safety Promotion/Fall Prevention: safety round/check completed  Taken 4/10/2024 2355 by Lydia Dumont RN  Safety Promotion/Fall Prevention:   safety round/check completed   room organization consistent   clutter free environment maintained   assistive device/personal items within reach  Intervention: Prevent Skin Injury  Recent Flowsheet Documentation  Taken 4/10/2024 2355 by Lydia Dumont RN  Body Position: position changed independently  Skin Protection:   adhesive use limited   tubing/devices free from skin contact  Intervention: Prevent and Manage VTE (Venous Thromboembolism) Risk  Recent Flowsheet Documentation  Taken 4/10/2024 2355 by Lydia Dumont RN  Activity Management: activity encouraged  Range of Motion:  "Subjective   Cali Santoro is a 82 y.o. male.     Chief Complaint   Patient presents with   • Hypertension   • Coronary Artery Disease   • Sleeping Problem       HPI  Chief complaint: Hypertension hyperlipidemia coronary artery disease atrial fibrillation COPD type 2 diabetes mellitus    The patient is an 82-year-old male comes in for follow-up and maintenance of his current problems which includes    1.  Hypertension-stable-patient on Lasix 40 mg daily potassium Coreg 12.5 mg twice a day lisinopril 2.5 mg daily.  He denied headache lightheadedness dizziness or chest pain.    2.  Hyperlipidemia-stable-patient on Zocor 40 mg daily.  He denies myalgias and arthralgias.  Denied nausea or anorexia.    3.  Coronary artery disease-stable-patient is on aspirin 81 mg daily and Coreg.  He denies chest pain shortness of breath orthopnea or PND.    4.  Atrial fibrillation-stable-patient is on Coreg Lanoxin and Coumadin.  Denies episodes of rapid or slow heart rhythm.  He denied heart palpitations lightheadedness or dizziness.    5.  COPD-stable-patient's on rescue inhaler and theophylline 300 mg twice a day.  Denies cough shortness of breath wheezing or sputum production.    6.  Type 2 diabetes mellitus-stable-patient on Amaryl 2 mg twice a day Januvia 50 mg daily metformin thousand milligrams daily.  He denied polydipsia polyphagia or polyuria.  Denied low blood sugars.  The patient's recent A1c was 7.6 per      The following portions of the patient's history were reviewed and updated as appropriate: allergies, current medications, past family history, past medical history, past social history, past surgical history and problem list.    Review of Systems    Objective     /68 (BP Location: Left arm, Patient Position: Sitting, Cuff Size: Large Adult)   Pulse 69   Temp 98.5 °F (36.9 °C) (Oral)   Resp 20   Ht 170.2 cm (67\")   Wt 77.9 kg (171 lb 12.8 oz)   SpO2 95%   BMI 26.91 kg/m²     Physical Exam "   Constitutional: He is oriented to person, place, and time.   HENT:   Head: Normocephalic and atraumatic.   Eyes: Conjunctivae and EOM are normal. Pupils are equal, round, and reactive to light.   Neck: Normal range of motion. Neck supple.   Cardiovascular: Normal rate, normal heart sounds and intact distal pulses. An irregularly irregular rhythm present.   Pulmonary/Chest: Effort normal and breath sounds normal.   Abdominal: Soft. Bowel sounds are normal.   Musculoskeletal: Normal range of motion.   Neurological: He is alert and oriented to person, place, and time.   Skin: Skin is warm and dry.   Psychiatric: He has a normal mood and affect. His behavior is normal.   Nursing note and vitals reviewed.        Assessment/Plan   Cali was seen today for hypertension, coronary artery disease and sleeping problem.    Diagnoses and all orders for this visit:    Essential hypertension    Mixed hyperlipidemia    Coronary artery disease involving native coronary artery of native heart without angina pectoris    Other persistent atrial fibrillation    Chronic obstructive pulmonary disease, unspecified COPD type (CMS/HCC)    Type 2 diabetes mellitus with hyperglycemia, without long-term current use of insulin (CMS/[facility])      Patient Instructions   Continue your current medications and treatment.    Follow up in the office in 6 months.    Laboratory testing at that time.          Yusef Sosa Jr., MD    10/14/19   active ROM (range of motion) encouraged  Intervention: Prevent Infection  Recent Flowsheet Documentation  Taken 4/10/2024 2355 by Lydia Dumont RN  Infection Prevention:   single patient room provided   rest/sleep promoted   hand hygiene promoted  Goal: Optimal Comfort and Wellbeing  4/11/2024 0337 by Lydia Dumont RN  Outcome: Ongoing, Progressing  4/11/2024 0334 by Lydia Dumont RN  Outcome: Ongoing, Progressing  Intervention: Provide Person-Centered Care  Recent Flowsheet Documentation  Taken 4/10/2024 2355 by Lydia Dumont RN  Trust Relationship/Rapport:   care explained   choices provided   emotional support provided   empathic listening provided   questions answered   questions encouraged   reassurance provided   thoughts/feelings acknowledged  Goal: Readiness for Transition of Care  4/11/2024 0337 by Lydia Dumont RN  Outcome: Ongoing, Progressing  4/11/2024 0334 by Lydia Dumont RN  Outcome: Ongoing, Progressing  Intervention: Mutually Develop Transition Plan  Recent Flowsheet Documentation  Taken 4/11/2024 0003 by Lydia Dumont RN  Transportation Anticipated: family or friend will provide  Patient/Family Anticipated Services at Transition: none  Patient/Family Anticipates Transition to: home  Taken 4/11/2024 0001 by Lydia Dumont RN  Equipment Currently Used at Home: none  Taken 4/10/2024 2358 by Lydia Dumont RN  Transportation Anticipated: family or friend will provide  Patient/Family Anticipated Services at Transition: none  Patient/Family Anticipates Transition to: home     Problem: Fall Injury Risk  Goal: Absence of Fall and Fall-Related Injury  Outcome: Ongoing, Progressing  Intervention: Promote Injury-Free Environment  Recent Flowsheet Documentation  Taken 4/11/2024 0205 by Lydia Dumont RN  Safety Promotion/Fall Prevention: safety round/check completed  Taken 4/10/2024 2355 by Lydia Dumont RN  Safety Promotion/Fall Prevention:   safety round/check completed   room organization consistent   clutter  free environment maintained   assistive device/personal items within reach   Goal Outcome Evaluation:  Plan of Care Reviewed With: patient           Outcome Evaluation: Pt was admitted to rm 220 with Covid. Pt O2 drops to low 80's when ambulating to bathroom. Pt states this is normal for him. Pt has pulmonary consult in the morning. Will continue care plan.

## 2024-04-11 NOTE — CASE MANAGEMENT/SOCIAL WORK
Discharge Planning Assessment  [facility]     Patient Name: Willis Lechuga  MRN: [id number]  Today's Date: 4/11/2024    Admit Date: 4/10/2024    Plan: Return home with spouse and adult daughter. Has home oxygen and portable tanks   Discharge Needs Assessment       Row Name 04/11/24 1547       Living Environment    People in Home spouse;child(charisse), adult    Name(s) of People in Home spouse, Lula and daughter, Nichole    Current Living Arrangements home    Potentially Unsafe Housing Conditions none    In the past 12 months has the electric, gas, oil, or water company threatened to shut off services in your home? No    Primary Care Provided by self    Provides Primary Care For no one;no one, unable/limited ability to care for self    Family Caregiver if Needed spouse;child(charisse), adult    Family Caregiver Names spouse and dtr    Quality of Family Relationships helpful;involved;supportive    Able to Return to Prior Arrangements yes       Resource/Environmental Concerns    Resource/Environmental Concerns none    Transportation Concerns none       Transportation Needs    In the past 12 months, has lack of transportation kept you from medical appointments or from getting medications? no    In the past 12 months, has lack of transportation kept you from meetings, work, or from getting things needed for daily living? No       Food Insecurity    Within the past 12 months, you worried that your food would run out before you got the money to buy more. Never true    Within the past 12 months, the food you bought just didn't last and you didn't have money to get more. Never true       Transition Planning    Patient/Family Anticipates Transition to home with family    Patient/Family Anticipated Services at Transition none    Transportation Anticipated car, drives self;family or friend will provide       Discharge Needs Assessment    Readmission Within the Last 30 Days no previous admission in last 30 days    Equipment Currently Used  at Home oxygen    Concerns to be Addressed discharge planning    Anticipated Changes Related to Illness none    Equipment Needed After Discharge none    Outpatient/Agency/Support Group Needs homecare agency    Discharge Facility/Level of Care Needs home with home health                   Discharge Plan       Row Name 04/11/24 1541       Plan    Plan Return home with spouse and adult daughter. Has home oxygen and portable tanks    Patient/Family in Agreement with Plan yes    Plan Comments Barriers: Isolation for Positive Covid. Pulmonology following. CM called and interviewed daughter, Nichole who said she lives with her mother and father and help as needed. He sleeps in the recliner and spends most of  his time in the recliner. He can walk to the bathroom independently. He has continuous oxygen at 4 liters through [address] with portable tanks. He rarely leaves the  house  and when he does the daughter provides transportation.  CM verified PCP and Pharmacy. She denied any financial concerns. CM discussed recommendation for home health PT and his daughter said he will have to make that decision                       Demographic Summary       Row Name 04/11/24 154       General Information    Admission Type observation    Arrived From emergency department    Required Notices Provided Observation Status Notice    Referral Source admission list    Reason for Consult discharge planning    Preferred Language English       Contact Information    Permission Granted to Share Info With                    Functional Status       Row Name 04/11/24 1548       Functional Status    Usual Activity Tolerance moderate    Current Activity Tolerance fair       Functional Status, IADL    Medications assistive person    Meal Preparation completely dependent    Housekeeping completely dependent    Laundry completely dependent    Shopping completely dependent    IADL Comments independent       Employment/    Employment  Status retired                          Gillian FRIEDMANN,RN Case Manager  [facility]  Phone: Desk- [phone] cell- [phone]

## 2024-04-12 ENCOUNTER — READMISSION MANAGEMENT (OUTPATIENT)
Dept: CALL CENTER | Facility: HOSPITAL | Age: 89
End: 2024-04-12
Payer: MEDICARE

## 2024-04-12 ENCOUNTER — HOME HEALTH ADMISSION (OUTPATIENT)
Dept: HOME HEALTH SERVICES | Facility: HOME HEALTHCARE | Age: 89
End: 2024-04-12
Payer: MEDICARE

## 2024-04-12 ENCOUNTER — APPOINTMENT (OUTPATIENT)
Dept: GENERAL RADIOLOGY | Facility: HOSPITAL | Age: 89
End: 2024-04-12
Payer: MEDICARE

## 2024-04-12 ENCOUNTER — TELEPHONE (OUTPATIENT)
Dept: FAMILY MEDICINE CLINIC | Facility: CLINIC | Age: 89
End: 2024-04-12
Payer: MEDICARE

## 2024-04-12 VITALS
BODY MASS INDEX: 22.96 KG/M2 | SYSTOLIC BLOOD PRESSURE: 146 MMHG | HEIGHT: 69 IN | HEART RATE: 82 BPM | OXYGEN SATURATION: 97 % | WEIGHT: 154.98 LBS | DIASTOLIC BLOOD PRESSURE: 71 MMHG | TEMPERATURE: 96.5 F | RESPIRATION RATE: 16 BRPM

## 2024-04-12 PROBLEM — D89.831 CYTOKINE RELEASE SYNDROME, GRADE 1: Status: ACTIVE | Noted: 2024-04-12

## 2024-04-12 LAB
ANION GAP SERPL CALCULATED.3IONS-SCNC: 7 MMOL/L (ref 5–15)
BUN SERPL-MCNC: 18 MG/DL (ref 8–23)
BUN/CREAT SERPL: 28.1 (ref 7–25)
CALCIUM SPEC-SCNC: 9.3 MG/DL (ref 8.6–10.5)
CHLORIDE SERPL-SCNC: 93 MMOL/L (ref 98–107)
CO2 SERPL-SCNC: 33 MMOL/L (ref 22–29)
CREAT SERPL-MCNC: 0.64 MG/DL (ref 0.76–1.27)
CRP SERPL-MCNC: 3.16 MG/DL (ref 0–0.5)
EGFRCR SERPLBLD CKD-EPI 2021: 91.1 ML/MIN/1.73
GLUCOSE SERPL-MCNC: 123 MG/DL (ref 65–99)
POTASSIUM SERPL-SCNC: 4.4 MMOL/L (ref 3.5–5.2)
SODIUM SERPL-SCNC: 133 MMOL/L (ref 136–145)

## 2024-04-12 PROCEDURE — G0378 HOSPITAL OBSERVATION PER HR: HCPCS

## 2024-04-12 PROCEDURE — 94799 UNLISTED PULMONARY SVC/PX: CPT

## 2024-04-12 PROCEDURE — 86140 C-REACTIVE PROTEIN: CPT | Performed by: PHYSICIAN ASSISTANT

## 2024-04-12 PROCEDURE — 71045 X-RAY EXAM CHEST 1 VIEW: CPT

## 2024-04-12 PROCEDURE — 80048 BASIC METABOLIC PNL TOTAL CA: CPT | Performed by: PHYSICIAN ASSISTANT

## 2024-04-12 RX ORDER — ALBUTEROL SULFATE 90 UG/1
2 AEROSOL, METERED RESPIRATORY (INHALATION)
Qty: 18 G | Refills: 1 | Status: SHIPPED | OUTPATIENT
Start: 2024-04-12

## 2024-04-12 RX ORDER — IPRATROPIUM BROMIDE AND ALBUTEROL SULFATE 2.5; .5 MG/3ML; MG/3ML
3 SOLUTION RESPIRATORY (INHALATION)
Status: DISCONTINUED | OUTPATIENT
Start: 2024-04-12 | End: 2024-04-12

## 2024-04-12 RX ORDER — BUDESONIDE 0.5 MG/2ML
1 INHALANT ORAL
Qty: 60 EACH | Refills: 0 | Status: SHIPPED | OUTPATIENT
Start: 2024-04-12

## 2024-04-12 RX ORDER — PREDNISONE 10 MG/1
TABLET ORAL
Qty: 30 EACH | Refills: 0 | Status: SHIPPED | OUTPATIENT
Start: 2024-04-12 | End: 2024-04-12

## 2024-04-12 RX ORDER — ARFORMOTEROL TARTRATE 15 UG/2ML
15 SOLUTION RESPIRATORY (INHALATION)
Qty: 120 ML | Refills: 0 | Status: SHIPPED | OUTPATIENT
Start: 2024-04-12

## 2024-04-12 RX ORDER — GUAIFENESIN 600 MG/1
1200 TABLET, EXTENDED RELEASE ORAL 2 TIMES DAILY
Qty: 30 TABLET | Refills: 0 | Status: SHIPPED | OUTPATIENT
Start: 2024-04-12

## 2024-04-12 RX ORDER — IPRATROPIUM BROMIDE AND ALBUTEROL SULFATE 2.5; .5 MG/3ML; MG/3ML
3 SOLUTION RESPIRATORY (INHALATION)
Status: DISCONTINUED | OUTPATIENT
Start: 2024-04-12 | End: 2024-04-12 | Stop reason: HOSPADM

## 2024-04-12 RX ORDER — BUDESONIDE 0.5 MG/2ML
1 INHALANT ORAL
Qty: 60 EACH | Refills: 0 | Status: SHIPPED | OUTPATIENT
Start: 2024-04-12 | End: 2024-04-12

## 2024-04-12 RX ORDER — IPRATROPIUM BROMIDE AND ALBUTEROL SULFATE 2.5; .5 MG/3ML; MG/3ML
3 SOLUTION RESPIRATORY (INHALATION) EVERY 4 HOURS PRN
Qty: 360 ML | Refills: 6 | Status: SHIPPED | OUTPATIENT
Start: 2024-04-12

## 2024-04-12 RX ORDER — PREDNISONE 10 MG/1
TABLET ORAL
Qty: 30 EACH | Refills: 0 | Status: SHIPPED | OUTPATIENT
Start: 2024-04-12 | End: 2024-04-12 | Stop reason: HOSPADM

## 2024-04-12 RX ORDER — ALBUTEROL SULFATE 90 UG/1
2 AEROSOL, METERED RESPIRATORY (INHALATION)
Qty: 18 G | Refills: 1 | Status: SHIPPED | OUTPATIENT
Start: 2024-04-12 | End: 2024-04-12

## 2024-04-12 RX ORDER — ARFORMOTEROL TARTRATE 15 UG/2ML
15 SOLUTION RESPIRATORY (INHALATION)
Qty: 120 ML | Refills: 0 | Status: SHIPPED | OUTPATIENT
Start: 2024-04-12 | End: 2024-04-12

## 2024-04-12 RX ORDER — PREDNISONE 10 MG/1
TABLET ORAL
Qty: 30 EACH | Refills: 0 | Status: SHIPPED | OUTPATIENT
Start: 2024-04-12

## 2024-04-12 RX ORDER — GUAIFENESIN 600 MG/1
1200 TABLET, EXTENDED RELEASE ORAL 2 TIMES DAILY
Qty: 30 TABLET | Refills: 0 | Status: SHIPPED | OUTPATIENT
Start: 2024-04-12 | End: 2024-04-12

## 2024-04-12 RX ADMIN — GUAIFENESIN 1200 MG: 600 TABLET, EXTENDED RELEASE ORAL at 08:34

## 2024-04-12 RX ADMIN — DEXAMETHASONE 6 MG: 6 TABLET ORAL at 08:35

## 2024-04-12 RX ADMIN — LEVOTHYROXINE SODIUM 88 MCG: 0.09 TABLET ORAL at 06:34

## 2024-04-12 RX ADMIN — ALBUTEROL SULFATE 2 PUFF: 108 AEROSOL, METERED RESPIRATORY (INHALATION) at 07:35

## 2024-04-12 RX ADMIN — Medication 10 ML: at 08:34

## 2024-04-12 RX ADMIN — PIRFENIDONE 801 MG: 267 TABLET, FILM COATED ORAL at 08:35

## 2024-04-12 RX ADMIN — NIRMATRELVIR AND RITONAVIR 3 TABLET: KIT at 08:34

## 2024-04-12 NOTE — DISCHARGE SUMMARY
[facility] Medicine Services  Discharge Summary    Date of Service: 24  Patient Name: Willis Lechuga  : 1935  MRN: [id number]    Date of Admission: 4/10/2024  Discharge Diagnosis:   Acute on chronic respiratory failure with hypoxia in setting of Covid viral infection and underlying pulmonary fibrosis.  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     Acute on chronic respiratory failure with hypoxia in setting of Covid viral infection and underlying pulmonary fibrosis.  -Covid positive 4/10  -RT evaluation/treatment oxygen therapy. Baseline 3.5 lpm currently 5 lpm  -pulmonary on board  -continue bronchodilators and oral decadron  -continue paxlovid and mucinex.   -continue home PF medications.   -droplet isolation  -sputum culture pending.      Hypothyroidism:  -continue levothyroxine.  -TSH 3.14 on 4/10/2024    Hospital Course     HPI: Patient is a 88 year old male with history of pulmonary fibrosis with use of 3.5 lpm/NC at baseline, HLD, hypothyroidism, HLD, HTN, cirrhosis, . Lives at home. Presented to ED on 4/10 for cough, congestion, fever, and positive home Covid test. Was admitted to observation unit overnight with pulmonary consult.      Upon evaluation, awake, alert, resting in bed. Current VS: 97-/64-97% on 5 lpm/NC. Remains with productive cough thick yellow sputum. Endorses dyspnea with any  movement. Has been afebrile since arrival to hospital. Sputum culture has been collected.     4/12/24 patient seen and examined medical distress, dyspnea on None on 3 L of oxygen.  Patient wants to go home.  Vital signs stable, discussed with RN, will discharge patient home.  Condition at discharge stable.    DISCHARGE Follow Up Recommendations for labs and diagnostics: Follow-up with PCP in a week      Reasons For Change In Medications and Indications for New Medications:  START taking:  albuterol sulfate HFA (PROVENTIL HFA;VENTOLIN HFA;PROAIR HFA)   arformoterol (BROVANA)   budesonide (PULMICORT)   guaiFENesin (MUCINEX)   predniSONE (DELTASONE)     Day of Discharge     Vital Signs:  Temp:  [95.6 °F (35.3 °C)-97.4 °F (36.3 °C)] 96.5 °F (35.8 °C)  Heart Rate:  [79-97] 82  Resp:  [14-35] 16  BP: (115-146)/(56-71) 146/71  Flow (L/min):  [4] 4      Physical Exam      Appearance: Normal appearance.   HENT:      Head: Normocephalic and atraumatic.      Mouth/Throat:      Mouth: Mucous membranes are moist.   Eyes:      Extraocular Movements: Extraocular movements intact.      Pupils: Pupils are equal, round, and reactive to light.   Cardiovascular:      Rate and Rhythm: Rhythm irregular.      Heart sounds: Murmur heard.      Comments: Mild tachycardia HR 90-100s  Pulmonary:      Breath sounds: Rhonchi present.      Comments: Tachypnea rate 22  Course breath sounds  Productive cough thick yellow sputum  Abdominal:      General: Bowel sounds are normal.      Palpations: Abdomen is soft.   Musculoskeletal:         General: Normal range of motion.   Skin:     General: Skin is warm and dry.   Neurological:      General: No focal deficit present.      Mental Status: He is alert.   Psychiatric:         Mood and Affect: Mood normal.                Pertinent  and/or Most Recent Results     LAB RESULTS:      Lab 04/12/24  0444 04/11/24  0235 04/10/24  2031   WBC  --  11.70* 9.45   HEMOGLOBIN  --  11.4* 12.0*   HEMATOCRIT  --  36.4*  38.3   PLATELETS  --  225 213   NEUTROS ABS  --  10.42* 7.09*   IMMATURE GRANS (ABS)  --  0.06* 0.03   LYMPHS ABS  --  0.78 0.96   MONOS ABS  --  0.36 1.07*   EOS ABS  --  0.06 0.27   MCV  --  97.1* 98.0*   CRP 3.16* 5.68*  --    PROCALCITONIN  --   --  0.05         Lab 04/12/24  0444 04/11/24  0235 04/10/24  2031   SODIUM 133* 136 137   POTASSIUM 4.4 4.6 4.3   CHLORIDE 93* 97* 96*   CO2 33.0* 31.0* 34.0*   ANION GAP 7.0 8.0 7.0   BUN 18 17 17   CREATININE 0.64* 0.71* 0.72*   EGFR 91.1 88.2 87.9   GLUCOSE 123* 124* 125*   CALCIUM 9.3 9.6 9.7   MAGNESIUM  --   --  2.5*   TSH  --   --  3.140         Lab 04/10/24  2031   TOTAL PROTEIN 6.9   ALBUMIN 3.6   GLOBULIN 3.3   ALT (SGPT) 21   AST (SGOT) 24   BILIRUBIN <0.2   ALK PHOS 61         Lab 04/10/24  2031   PROBNP 113.6   HSTROP T 15                 Brief Urine Lab Results  (Last result in the past 365 days)        Color   Clarity   Blood   Leuk Est   Nitrite   Protein   CREAT   Urine HCG        12/04/23 1023 Yellow   Clear   Negative   Negative   Negative   Negative                 Microbiology Results (last 10 days)       Procedure Component Value - Date/Time    Respiratory Culture - Sputum, Cough [[id number]] Collected: 04/11/24 0514    Lab Status: Preliminary result Specimen: Sputum from Cough Updated: 04/12/24 1044     Respiratory Culture Heavy growth (4+) The culture consists of normal respiratory sarah. This is a preliminary report; final report to follow.     Gram Stain Moderate (3+) WBCs per low power field      Rare (1+) Epithelial cells per low power field      Few (2+) Mixed bacterial morphotypes seen on Gram Stain    Respiratory Panel PCR w/COVID-19(SARS-CoV-2) PAUL/NICOLASA/ANTONY/PAD/COR/MELISSA In-House, NP Swab in UTM/VTM, 2 HR TAT - Swab, Nasopharynx [[id number]]  (Abnormal) Collected: 04/10/24 2032    Lab Status: Final result Specimen: Swab from Nasopharynx Updated: 04/10/24 2129     ADENOVIRUS, PCR Not Detected     Coronavirus 229E Not Detected     Coronavirus  HKU1 Not Detected     Coronavirus NL63 Not Detected     Coronavirus OC43 Not Detected     COVID19 Detected     Human Metapneumovirus Not Detected     Human Rhinovirus/Enterovirus Not Detected     Influenza A PCR Not Detected     Influenza B PCR Not Detected     Parainfluenza Virus 1 Not Detected     Parainfluenza Virus 2 Not Detected     Parainfluenza Virus 3 Not Detected     Parainfluenza Virus 4 Not Detected     RSV, PCR Not Detected     Bordetella pertussis pcr Not Detected     Bordetella parapertussis PCR Not Detected     Chlamydophila pneumoniae PCR Not Detected     Mycoplasma pneumo by PCR Not Detected    Narrative:      In the setting of a positive respiratory panel with a viral infection PLUS a negative procalcitonin without other underlying concern for bacterial infection, consider observing off antibiotics or discontinuation of antibiotics and continue supportive care. If the respiratory panel is positive for atypical bacterial infection (Bordetella pertussis, Chlamydophila pneumoniae, or Mycoplasma pneumoniae), consider antibiotic de-escalation to target atypical bacterial infection.            XR Chest 1 View    Result Date: 4/12/2024  Impression: Impression: Advanced chronic interstitial pulmonary fibrosis. No acute airspace disease is identified. Electronically Signed: Nannette Beebe MD  4/12/2024 7:25 AM EDT  Workstation ID: [id number]    XR Chest 1 View    Result Date: 4/10/2024  Impression: Impression: Stable chronic interstitial lung disease with fibrosis pattern No definite acute pulmonary process Electronically Signed: Saman Paris MD  4/10/2024 8:21 PM EDT  Workstation ID: [id number]     Results for orders placed during the hospital encounter of 12/01/23    Duplex Venous Lower Extremity - Left CAR    Interpretation Summary    Normal left lower extremity venous duplex scan.      Results for orders placed during the hospital encounter of 12/01/23    Duplex Venous Lower Extremity - Left  CAR    Interpretation Summary    Normal left lower extremity venous duplex scan.      Results for orders placed during the hospital encounter of 12/01/23    Adult Transthoracic Echo Complete With Contrast if Necessary Per Protocol (With Agitated Saline)    Interpretation Summary    Left ventricular ejection fraction appears to be 56 - 60%.    The right ventricular cavity is mildly dilated.      Labs Pending at Discharge:  Pending Labs       Order Current Status    Respiratory Culture - Sputum, Cough Preliminary result            Procedures Performed           Consults:   Consults       Date and Time Order Name Status Description    4/11/2024  1:27 PM Inpatient Hospitalist Consult Completed     4/10/2024 10:45 PM Inpatient Pulmonology Consult Completed               Discharge Details        Discharge Medications        New Medications        Instructions Start Date   albuterol sulfate  (90 Base) MCG/ACT inhaler  Commonly known as: PROVENTIL HFA;VENTOLIN HFA;PROAIR HFA   2 puffs, Inhalation, 4 Times Daily - RT      arformoterol 15 MCG/2ML nebulizer solution  Commonly known as: BROVANA   15 mcg, Nebulization, 2 Times Daily - RT      budesonide 0.5 MG/2ML nebulizer solution  Commonly known as: PULMICORT   1 mg, Nebulization, 2 Times Daily - RT      guaiFENesin 600 MG 12 hr tablet  Commonly known as: MUCINEX   1,200 mg, Oral, 2 Times Daily      ipratropium-albuterol 0.5-2.5 mg/3 ml nebulizer  Commonly known as: DUO-NEB   3 mL, Nebulization, Every 4 Hours PRN      predniSONE 10 MG (48) dose pack  Commonly known as: DELTASONE   40 mg po qd x 3 days 30 mg po qd x 3 days 20 mg po qd x 3 days 10 mg po qd x 3 days             Continue These Medications        Instructions Start Date   diphenoxylate-atropine 2.5-0.025 MG per tablet  Commonly known as: LOMOTIL   TAKE 1 TABLET FOUR TIMES A DAY AS NEEDED FOR DIARRHEA FOR UP TO 30 DAYS      levothyroxine 88 MCG tablet  Commonly known as: SYNTHROID, LEVOTHROID   TAKE 1 TABLET  DAILY      meclizine 25 MG tablet  Commonly known as: ANTIVERT   25 mg, Oral, 3 Times Daily PRN      multivitamin with minerals tablet tablet   1 tablet, Oral, Daily      Nirmatrelvir & Ritonavir (300mg/100mg)  Commonly known as: PAXLOVID   3 tablets, Oral, 2 Times Daily      Pirfenidone 801 MG tablet  Commonly known as: Esbriet   801 mg, Oral, 3 times daily      vitamin C 250 MG tablet  Commonly known as: ASCORBIC ACID   1 tablet, Oral, Daily      zinc sulfate 220 (50 Zn) MG capsule  Commonly known as: ZINCATE   220 mg, Oral, Daily               No Known Allergies      Discharge Disposition:   Home or Self Care    Diet:  Hospital:  Diet Order   Procedures    Diet: Regular/House; Fluid Consistency: Thin (IDDSI 0)         Discharge Activity:   Activity Instructions       Gradually Increase Activity Until at Pre-Hospitalization Level                CODE STATUS:  Code Status and Medical Interventions:   Ordered at: 04/11/24 0346     Medical Intervention Limits:    NO intubation (DNI)     Code Status (Patient has no pulse and is not breathing):    No CPR (Do Not Attempt to Resuscitate)     Medical Interventions (Patient has pulse or is breathing):    Limited Support         No future appointments.    Additional Instructions for the Follow-ups that You Need to Schedule       Ambulatory Referral to Home Health ([facility])   As directed      Face to Face Visit Date: 4/12/2024   Follow-up provider for Plan of Care?: I treated the patient in an acute care facility and will not continue treatment after discharge.   Follow-up provider: SAEED WOLF [[id number]]   Reason/Clinical Findings: weakness   Describe mobility limitations that make leaving home difficult: weakness   Nursing/Therapeutic Services Requested: Skilled Nursing Physical Therapy   Skilled nursing orders: Medication education COPD management   Frequency: 1 Week 1        Discharge Follow-up with PCP   As directed       Currently Documented PCP:    Saeed Wolf  MD LILLIAN    PCP Phone Number:    [phone]     Follow Up Details: 1 week        Discharge Follow-up with Specified Provider: pulmonary; 1 Week   As directed      To: pulmonary   Follow Up: 1 Week                Time spent on Discharge including face to face service:  >30 minutes    Signature: Electronically signed by Donovan Keating MD, 04/12/24, 14:20 EDT.  [facility] Hospitalist Team

## 2024-04-12 NOTE — OUTREACH NOTE
Prep Survey      Flowsheet Row Responses   [facility] patient discharged from? [facility]   Is LACE score < 7 ? Yes   Eligibility [facility]   Date of Admission 04/10/24   Date of Discharge 04/12/24   Discharge Disposition [facility] [place] [facility]   Discharge diagnosis Acute on chronic respiratory failure with hypoxia in setting of Covid viral infection and underlying pulmonary fibrosis.   Does the patient have one of the following disease processes/diagnoses(primary or secondary)? Other   Does the patient have Home health ordered? Yes   What is the Home health agency?  [facility]   Is there a DME ordered? Yes   What DME was ordered? home nebulizer   Prep survey completed? Yes            Abbie CURTIS - Registered Nurse

## 2024-04-12 NOTE — PLAN OF CARE
Problem: Adult Inpatient Plan of Care  Goal: Plan of Care Review  Outcome: Met  Goal: Patient-Specific Goal (Individualized)  Outcome: Met  Goal: Absence of Hospital-Acquired Illness or Injury  Outcome: Met  Intervention: Identify and Manage Fall Risk  Recent Flowsheet Documentation  Taken 4/12/2024 0927 by Margoth Bangura RN  Safety Promotion/Fall Prevention:   safety round/check completed   nonskid shoes/slippers when out of bed   fall prevention program maintained   clutter free environment maintained   assistive device/personal items within reach  Intervention: Prevent Skin Injury  Recent Flowsheet Documentation  Taken 4/12/2024 0927 by Margoth Bangura RN  Body Position: position changed independently  Intervention: Prevent and Manage VTE (Venous Thromboembolism) Risk  Recent Flowsheet Documentation  Taken 4/12/2024 0927 by Margoth Bangura RN  Activity Management: up ad kandy  Intervention: Prevent Infection  Recent Flowsheet Documentation  Taken 4/12/2024 0927 by Margoth Bangura RN  Infection Prevention:   single patient room provided   rest/sleep promoted  Goal: Optimal Comfort and Wellbeing  Outcome: Met  Intervention: Provide Person-Centered Care  Recent Flowsheet Documentation  Taken 4/12/2024 0835 by Margoth Bangura RN  Trust Relationship/Rapport:   choices provided   care explained   questions answered  Goal: Readiness for Transition of Care  Outcome: Met     Problem: Fall Injury Risk  Goal: Absence of Fall and Fall-Related Injury  Outcome: Met  Intervention: Identify and Manage Contributors  Recent Flowsheet Documentation  Taken 4/12/2024 0927 by Margoth Bangura RN  Medication Review/Management: medications reviewed  Intervention: Promote Injury-Free Environment  Recent Flowsheet Documentation  Taken 4/12/2024 0927 by Margoth Bangura RN  Safety Promotion/Fall Prevention:   safety round/check completed   nonskid shoes/slippers when out of bed   fall prevention program maintained   clutter free environment  maintained   assistive device/personal items within reach   Goal Outcome Evaluation:

## 2024-04-12 NOTE — PROGRESS NOTES
"PULMONARY CRITICAL CARE PROGRESS  NOTE      PATIENT IDENTIFICATION:  Name: Willis Lechuga  MRN: [id number]  :  1935     Age: 88 y.o.  Sex: male    DATE OF Note:  2024   Referring Physician: Waqas Yen MD                  Subjective:   Feeling better, no SOB, no chest or abd pain, no bowel or bladder issues       Objective:  tMax 24 hrs: Temp (24hrs), Av.9 °F (36.1 °C), Min:96.5 °F (35.8 °C), Max:97.4 °F (36.3 °C)      Vitals Ranges:   Temp:  [96.5 °F (35.8 °C)-97.4 °F (36.3 °C)] 96.5 °F (35.8 °C)  Heart Rate:  [79-96] 82  Resp:  [14-21] 16  BP: (132-146)/(56-71) 146/71    Intake and Output Last 3 Shifts:   I/O last 3 completed shifts:  In: 360 [P.O.:360]  Out: 550 [Urine:550]    Exam:  /71 (BP Location: Right arm, Patient Position: Lying)   Pulse 82   Temp 96.5 °F (35.8 °C) (Oral)   Resp 16   Ht 175.3 cm (69.02\")   Wt 70.3 kg (154 lb 15.7 oz)   SpO2 97%   BMI 22.88 kg/m²     General Appearance:     HEENT:  Normocephalic, without obvious abnormality. Conjunctivae/corneas clear.  Normal external ear canals. Nares normal, no drainage     Neck:  Supple, symmetrical, trachea midline. No JVD.  Lungs /Chest wall:   Bilateral basal rhonchi, respirations unlabored, symmetrical wall movement.     Heart:  Regular rate and rhythm, systolic murmur PMI left sternal border  Abdomen: Soft, nontender, no masses, no organomegaly.    Extremities: Trace edema, no clubbing or cyanosis        Medications:      Infusion:  No current facility-administered medications for this encounter.       PRN:    Data Review:  All labs (24hrs):   Recent Results (from the past 24 hour(s))   Basic Metabolic Panel    Collection Time: 24  4:44 AM    Specimen: Arm, Right; Blood   Result Value Ref Range    Glucose 123 (H) 65 - 99 mg/dL    BUN 18 8 - 23 mg/dL    Creatinine 0.64 (L) 0.76 - 1.27 mg/dL    Sodium 133 (L) 136 - 145 mmol/L    Potassium 4.4 3.5 - 5.2 mmol/L    Chloride 93 (L) 98 - 107 mmol/L    CO2 33.0 " (H) 22.0 - 29.0 mmol/L    Calcium 9.3 8.6 - 10.5 mg/dL    BUN/Creatinine Ratio 28.1 (H) 7.0 - 25.0    Anion Gap 7.0 5.0 - 15.0 mmol/L    eGFR 91.1 >60.0 mL/min/1.73   C-reactive Protein    Collection Time: 04/12/24  4:44 AM    Specimen: Arm, Right; Blood   Result Value Ref Range    C-Reactive Protein 3.16 (H) 0.00 - 0.50 mg/dL        Imaging:  XR Chest 1 View  Narrative: XR CHEST 1 VW    Date of Exam: 4/12/2024 5:30 AM EDT    Indication: Shortness of breath    Comparison: AP portable chest 4/10/2024. PET/CT 11/24/2021. CT chest 11/18/2021    Findings:  Low volume inspiration. Coarse interstitial thickening in both lungs, greatest in the periphery, greatest in the right upper lobe and left lower lobe, favored to reflect changes of chronic interstitial fibrosis, demonstrated to better advantage on prior   CT imaging. No definite superimposed acute airspace disease is identified. There is no pleural effusion or pneumothorax. There is stable asymmetric elevation of right hemidiaphragm. No acute osseous abnormalities identified  Impression: Impression:  Advanced chronic interstitial pulmonary fibrosis. No acute airspace disease is identified.    Electronically Signed: Nannette Beebe MD    4/12/2024 7:25 AM EDT    Workstation ID: [id number]       ASSESSMENT:  Acute respiratory distress   Pulmonary fibrosis status post COVID     COVID-19    Chronic respiratory failure  Obstructive sleep apnea           PLAN:  Discharge and follow up in 2-3 weeks   Oxygen supplement   Encouraged to use IS flutter valve  Bronchodilator  Inhaled corticosteroids  Electrolytes/ glycemic control  DVT and GI prophylaxis.    Discussed with Dr Vera Blankenship, KIMMIE   4/12/2024  18:07 EDT    I personally have examined  and interviewed the patient. I have reviewed the history, data, problems, assessment and plan with our NP.  Total Critical care time in direct medical management (   ) minutes, This time specifically excludes time spent  performing procedures.    He Gamez MD   4/12/2024  23:23 EDT

## 2024-04-12 NOTE — TELEPHONE ENCOUNTER
Hub staff attempted to follow warm transfer process and was unsuccessful     Caller: Willis Lechuga    Relationship to patient: Self    Best call back number: [phone]     Patient is needing:     NEEDING TO SCHEDULE A HOSP FOLLOW UP.     TREATED AT [facility] FOR COVID AND WEAKNESS D/C 4.12.24

## 2024-04-12 NOTE — CASE MANAGEMENT/SOCIAL WORK
Case Management Discharge Note      Final Note: Home with [facility]              Durable Medical Equipment Coordination complete.      Service Provider Selected Services Address Phone Fax Patient Preferred    [facility] [address], [facility] [id number] [phone] --       Internal Comment last updated by Gillian Mike 4/12/2024 1032    Nebulizer                                 Home Medical Care Coordination complete.      Service Provider Selected Services Address Phone Fax Patient Preferred    [facility] ,  Home Rehabilitation [address], [address], [place] [facility] [place] [address] [phone] --                      Transportation Services  Private: Car    Final Discharge Disposition Code: 06 - home with home health care

## 2024-04-12 NOTE — DISCHARGE PLACEMENT REQUEST
"Shawn Mojica (88 y.o. Male)       Date of Birth   1935    Social Security Number       Address   [address] [place] [address]    Home Phone   [phone]    MRN   [id number]       Gnosticist   None    Marital Status                               Admission Date   4/10/24    Admission Type   Emergency    Admitting Provider   Waqas Yen MD    Attending Provider   Donovan Keating MD    Department, Room/Bed   [facility] OBSERVATION, 223/1       Discharge Date       Discharge Disposition   Home or Self Care    Discharge Destination                                 Attending Provider: Donovan Keating MD    Allergies: No Known Allergies    Isolation: Enh Drop/Con   Infection: COVID (confirmed) (04/10/24)   Code Status: No CPR    Ht: 175.3 cm (69.02\")   Wt: 70.3 kg (154 lb 15.7 oz)    Admission Cmt: None   Principal Problem: COVID-19 [U07.1]                   Active Insurance as of 4/10/2024       Primary Coverage       Payor Plan Insurance Group Employer/Plan Group    MEDICARE MEDICARE A & B        Payor Plan Address Payor Plan Phone Number Payor Plan Fax Number Effective Dates    [address] [phone]  8/1/2000 - None Entered    [facility] [id number]         Subscriber Name Subscriber Birth Date Member ID       SHAWN MOJICA 1935 [id number]               Secondary Coverage       Payor Plan Insurance Group Employer/Plan Group     FOR LIFE  FOR LIFE [employer] SUP         Payor Plan Address Payor Plan Phone Number Payor Plan Fax Number Effective Dates    [address] [phone]  9/9/2019 - None Entered    [facility] [address]         Subscriber Name Subscriber Birth Date Member ID       SHAWN MOJICA 1935 [id number]                     Emergency Contacts        (Rel.) Home Phone Work Phone Mobile Phone    ETELVINA MOJICA (Spouse) [phone] -- [phone]              Insurance Information                  MEDICARE/MEDICARE A " & B Phone: [phone]    Subscriber: Willis Lechuga Subscriber#: [id number]    Group#: -- Precert#: --         FOR LIFE/ FOR LIFE [facility]  Phone: [phone]    Subscriber: Willis Lechuga Subscriber#: [id number]    Group#: -- Precert#: --

## 2024-04-12 NOTE — PLAN OF CARE
Problem: Adult Inpatient Plan of Care  Goal: Plan of Care Review  Outcome: Ongoing, Progressing  Flowsheets (Taken 4/12/2024 0532)  Plan of Care Reviewed With: patient  Outcome Evaluation: Pt is stable. Pt has been sleeping between care. Pt stated that he felt his breathing has improved. Will continue to monitor pt during this shift.  Goal: Patient-Specific Goal (Individualized)  Outcome: Ongoing, Progressing  Goal: Absence of Hospital-Acquired Illness or Injury  Outcome: Ongoing, Progressing  Intervention: Identify and Manage Fall Risk  Recent Flowsheet Documentation  Taken 4/12/2024 0410 by Lydia Dumont RN  Safety Promotion/Fall Prevention:   safety round/check completed   room organization consistent   clutter free environment maintained   assistive device/personal items within reach  Taken 4/12/2024 0214 by Lydia Dumont RN  Safety Promotion/Fall Prevention: safety round/check completed  Taken 4/12/2024 0015 by Lydia Dumont RN  Safety Promotion/Fall Prevention:   safety round/check completed   room organization consistent   clutter free environment maintained   assistive device/personal items within reach  Taken 4/11/2024 2213 by Lydia Dumont RN  Safety Promotion/Fall Prevention: safety round/check completed  Taken 4/11/2024 2015 by Lydia Dumont RN  Safety Promotion/Fall Prevention:   safety round/check completed   room organization consistent   clutter free environment maintained   assistive device/personal items within reach  Intervention: Prevent Skin Injury  Recent Flowsheet Documentation  Taken 4/11/2024 2015 by Lydia Dumont RN  Body Position: position changed independently  Skin Protection:   adhesive use limited   tubing/devices free from skin contact  Intervention: Prevent and Manage VTE (Venous Thromboembolism) Risk  Recent Flowsheet Documentation  Taken 4/11/2024 2015 by Lydia Dumont RN  Activity Management: activity encouraged  Range of Motion: active ROM (range of motion)  encouraged  Intervention: Prevent Infection  Recent Flowsheet Documentation  Taken 4/12/2024 0410 by Lydia Dumont RN  Infection Prevention:   single patient room provided   rest/sleep promoted   hand hygiene promoted  Taken 4/12/2024 0015 by Lydia Dumont RN  Infection Prevention:   single patient room provided   rest/sleep promoted   hand hygiene promoted  Taken 4/11/2024 2015 by Lydia Dumont RN  Infection Prevention:   single patient room provided   rest/sleep promoted   hand hygiene promoted  Goal: Optimal Comfort and Wellbeing  Outcome: Ongoing, Progressing  Intervention: Provide Person-Centered Care  Recent Flowsheet Documentation  Taken 4/11/2024 2015 by Lydia Dumont RN  Trust Relationship/Rapport:   care explained   choices provided   emotional support provided   empathic listening provided   questions answered   questions encouraged   reassurance provided   thoughts/feelings acknowledged  Goal: Readiness for Transition of Care  Outcome: Ongoing, Progressing     Problem: Fall Injury Risk  Goal: Absence of Fall and Fall-Related Injury  Outcome: Ongoing, Progressing  Intervention: Promote Injury-Free Environment  Recent Flowsheet Documentation  Taken 4/12/2024 0410 by Lydia Dumont RN  Safety Promotion/Fall Prevention:   safety round/check completed   room organization consistent   clutter free environment maintained   assistive device/personal items within reach  Taken 4/12/2024 0214 by Lydia Dumont RN  Safety Promotion/Fall Prevention: safety round/check completed  Taken 4/12/2024 0015 by Lydia Dumont RN  Safety Promotion/Fall Prevention:   safety round/check completed   room organization consistent   clutter free environment maintained   assistive device/personal items within reach  Taken 4/11/2024 2213 by Lydia Dumont, RN  Safety Promotion/Fall Prevention: safety round/check completed  Taken 4/11/2024 2015 by Lydia Dumont RN  Safety Promotion/Fall Prevention:   safety round/check completed   room  organization consistent   clutter free environment maintained   assistive device/personal items within reach   Goal Outcome Evaluation:  Plan of Care Reviewed With: patient           Outcome Evaluation: Pt is stable. Pt has been sleeping between care. Pt stated that he felt his breathing has improved. Will continue to monitor pt during this shift.

## 2024-04-13 LAB
BACTERIA SPEC RESP CULT: NORMAL
GRAM STN SPEC: NORMAL

## 2024-04-15 ENCOUNTER — TELEPHONE (OUTPATIENT)
Dept: FAMILY MEDICINE CLINIC | Facility: CLINIC | Age: 89
End: 2024-04-15
Payer: MEDICARE

## 2024-04-15 ENCOUNTER — TRANSITIONAL CARE MANAGEMENT TELEPHONE ENCOUNTER (OUTPATIENT)
Dept: CALL CENTER | Facility: HOSPITAL | Age: 89
End: 2024-04-15
Payer: MEDICARE

## 2024-04-15 NOTE — OUTREACH NOTE
Call Center TCM Note      Flowsheet Row Responses   [facility] patient discharged from? [facility]   Does the patient have one of the following disease processes/diagnoses(primary or secondary)? Other   TCM attempt successful? Yes   Call start time 1202   Call end time 1204   Discharge diagnosis Acute on chronic respiratory failure with hypoxia in setting of Covid viral infection and underlying pulmonary fibrosis.   Does the patient have all medications ordered at discharge? Yes   Is the patient taking all medications as directed (includes completed medication regime)? Yes   Comments Hospital f/u appt with PCP Dr. Giraldo on 4/19   Does the patient have an appointment with their PCP within 7-14 days of discharge? Yes   What is the [facility] health agency?  [facility]   Has home health visited the patient within 72 hours of discharge? Yes   Psychosocial issues? No   Did the patient receive a copy of their discharge instructions? Yes   What is the patient's perception of their health status since discharge? Improving   Is the patient/caregiver able to teach back signs and symptoms related to disease process for when to call PCP? Yes   Is the patient/caregiver able to teach back signs and symptoms related to disease process for when to call 911? Yes   Is the patient/caregiver able to teach back the hierarchy of who to call/visit for symptoms/problems? PCP, Specialist, Home health nurse, Urgent Care, ED, 911 Yes   TCM call completed? Yes   Wrap up additional comments Brief call with spouse during TCM call, spouse states PT is there working with patient, no questions or concerns, confirmed hospital f/u appt with PCP for 4/19.   Call end time 1204   Would this patient benefit from a Referral to Amb Social Work? No   Is the patient interested in additional calls from an ambulatory ? No            Darcie Pisano RN    4/15/2024, 12:04 EDT

## 2024-04-15 NOTE — TELEPHONE ENCOUNTER
Caller: [facility]    Relationship to patient:     Best call back number: [phone]    Patient is needing: SHE HAS STARTED CARE.

## 2024-05-01 ENCOUNTER — TELEPHONE (OUTPATIENT)
Dept: FAMILY MEDICINE CLINIC | Facility: CLINIC | Age: 89
End: 2024-05-01

## 2024-05-01 NOTE — TELEPHONE ENCOUNTER
Hub staff attempted to follow warm transfer process and was unsuccessful     Caller: Willis Lechuga    Relationship to patient: Self    Best call back number: [phone]     Patient is needing: PATIENT'S WIFE IS IN THE HOSPITAL. HE NEEDS TO RESCHEDULE HIS HOSPITAL FOLLOW UP THAT WAS FOR TODAY.

## 2024-10-31 RX ORDER — LEVOTHYROXINE SODIUM 88 UG/1
88 TABLET ORAL DAILY
Qty: 90 TABLET | Refills: 1 | Status: SHIPPED | OUTPATIENT
Start: 2024-10-31

## 2024-10-31 NOTE — TELEPHONE ENCOUNTER
Caller: [facility] - [place], [place] - [address] [place] [address] - [phone] [facility] [phone] FX    Relationship: Pharmacy    Best call back number: [phone]     Requested Prescriptions:   Requested Prescriptions     Pending Prescriptions Disp Refills    levothyroxine (SYNTHROID, LEVOTHROID) 88 MCG tablet 90 tablet 3     Sig: Take 1 tablet by mouth Daily.        Pharmacy where request should be sent: [facility] - [place], [place] - [address] [place] [address] - [phone] [facility] [phone]     Last office visit with prescribing clinician: Visit date not found   Last telemedicine visit with prescribing clinician: Visit date not found   Next office visit with prescribing clinician: Visit date not found     Additional details provided by patient: PHARMACY REQUESTING PRESCRIPTION    Does the patient have less than a 3 day supply:  [] Yes  [] No    Mar Cochran Rep   10/31/24 13:27 EDT

## 2024-11-12 ENCOUNTER — HOSPITAL ENCOUNTER (EMERGENCY)
Facility: HOSPITAL | Age: 89
Discharge: HOME | End: 2024-11-12
Attending: EMERGENCY MEDICINE | Admitting: EMERGENCY MEDICINE
Payer: MEDICARE

## 2024-11-12 VITALS
WEIGHT: 150 LBS | SYSTOLIC BLOOD PRESSURE: 113 MMHG | BODY MASS INDEX: 22.22 KG/M2 | HEIGHT: 69 IN | HEART RATE: 91 BPM | TEMPERATURE: 97.8 F | DIASTOLIC BLOOD PRESSURE: 54 MMHG | OXYGEN SATURATION: 99 % | RESPIRATION RATE: 16 BRPM

## 2024-11-12 DIAGNOSIS — K56.41 FECAL IMPACTION: Primary | ICD-10-CM

## 2024-11-12 PROCEDURE — 99284 EMERGENCY DEPT VISIT MOD MDM: CPT
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
## (undated) DEVICE — BAPTIST FLOYD BRONCHOSCOPY: Brand: MEDLINE INDUSTRIES, INC.